# Patient Record
Sex: MALE | Race: BLACK OR AFRICAN AMERICAN | NOT HISPANIC OR LATINO
[De-identification: names, ages, dates, MRNs, and addresses within clinical notes are randomized per-mention and may not be internally consistent; named-entity substitution may affect disease eponyms.]

---

## 2017-02-06 ENCOUNTER — APPOINTMENT (OUTPATIENT)
Dept: THORACIC SURGERY | Facility: CLINIC | Age: 72
End: 2017-02-06

## 2017-02-06 VITALS
RESPIRATION RATE: 16 BRPM | HEART RATE: 62 BPM | SYSTOLIC BLOOD PRESSURE: 110 MMHG | WEIGHT: 179 LBS | OXYGEN SATURATION: 93 % | HEIGHT: 70 IN | BODY MASS INDEX: 25.62 KG/M2 | DIASTOLIC BLOOD PRESSURE: 70 MMHG

## 2017-08-04 PROBLEM — Z87.898 HISTORY OF MULTIPLE PULMONARY NODULES: Status: RESOLVED | Noted: 2017-08-04 | Resolved: 2017-08-04

## 2017-08-07 ENCOUNTER — APPOINTMENT (OUTPATIENT)
Dept: THORACIC SURGERY | Facility: CLINIC | Age: 72
End: 2017-08-07
Payer: MEDICARE

## 2017-08-07 VITALS
DIASTOLIC BLOOD PRESSURE: 60 MMHG | HEIGHT: 70 IN | RESPIRATION RATE: 16 BRPM | WEIGHT: 178 LBS | SYSTOLIC BLOOD PRESSURE: 120 MMHG | HEART RATE: 62 BPM | OXYGEN SATURATION: 3 % | BODY MASS INDEX: 25.48 KG/M2

## 2017-08-07 DIAGNOSIS — Z87.898 PERSONAL HISTORY OF OTHER SPECIFIED CONDITIONS: ICD-10-CM

## 2017-08-07 PROCEDURE — 99214 OFFICE O/P EST MOD 30 MIN: CPT

## 2018-03-11 ENCOUNTER — INPATIENT (INPATIENT)
Facility: HOSPITAL | Age: 73
LOS: 0 days | Discharge: HOME HEALTH | End: 2018-03-12
Attending: INTERNAL MEDICINE | Admitting: INTERNAL MEDICINE
Payer: MEDICARE

## 2018-03-11 VITALS
OXYGEN SATURATION: 95 % | DIASTOLIC BLOOD PRESSURE: 64 MMHG | TEMPERATURE: 100 F | RESPIRATION RATE: 18 BRPM | SYSTOLIC BLOOD PRESSURE: 118 MMHG | HEART RATE: 99 BPM

## 2018-03-11 DIAGNOSIS — M79.89 OTHER SPECIFIED SOFT TISSUE DISORDERS: ICD-10-CM

## 2018-03-11 LAB
ALBUMIN SERPL ELPH-MCNC: 3.6 G/DL — SIGNIFICANT CHANGE UP (ref 3.3–5)
ALP SERPL-CCNC: 66 U/L — SIGNIFICANT CHANGE UP (ref 40–120)
ALT FLD-CCNC: 42 U/L — HIGH (ref 4–41)
APTT BLD: 35.9 SEC — SIGNIFICANT CHANGE UP (ref 27.5–37.4)
AST SERPL-CCNC: 74 U/L — HIGH (ref 4–40)
BASOPHILS # BLD AUTO: 0.04 K/UL — SIGNIFICANT CHANGE UP (ref 0–0.2)
BASOPHILS NFR BLD AUTO: 0.5 % — SIGNIFICANT CHANGE UP (ref 0–2)
BILIRUB SERPL-MCNC: 1.1 MG/DL — SIGNIFICANT CHANGE UP (ref 0.2–1.2)
BUN SERPL-MCNC: 23 MG/DL — SIGNIFICANT CHANGE UP (ref 7–23)
CALCIUM SERPL-MCNC: 9.6 MG/DL — SIGNIFICANT CHANGE UP (ref 8.4–10.5)
CHLORIDE SERPL-SCNC: 99 MMOL/L — SIGNIFICANT CHANGE UP (ref 98–107)
CO2 SERPL-SCNC: 27 MMOL/L — SIGNIFICANT CHANGE UP (ref 22–31)
CREAT SERPL-MCNC: 1.36 MG/DL — HIGH (ref 0.5–1.3)
CRP SERPL-MCNC: 36.8 MG/L — HIGH
EOSINOPHIL # BLD AUTO: 0.01 K/UL — SIGNIFICANT CHANGE UP (ref 0–0.5)
EOSINOPHIL NFR BLD AUTO: 0.1 % — SIGNIFICANT CHANGE UP (ref 0–6)
ERYTHROCYTE [SEDIMENTATION RATE] IN BLOOD: 1 MM/HR — SIGNIFICANT CHANGE UP (ref 1–15)
GLUCOSE SERPL-MCNC: 75 MG/DL — SIGNIFICANT CHANGE UP (ref 70–99)
HCT VFR BLD CALC: 42.7 % — SIGNIFICANT CHANGE UP (ref 39–50)
HGB BLD-MCNC: 12.3 G/DL — LOW (ref 13–17)
IMM GRANULOCYTES # BLD AUTO: 0.05 # — SIGNIFICANT CHANGE UP
IMM GRANULOCYTES NFR BLD AUTO: 0.6 % — SIGNIFICANT CHANGE UP (ref 0–1.5)
INR BLD: 1.94 — HIGH (ref 0.88–1.17)
LYMPHOCYTES # BLD AUTO: 1.17 K/UL — SIGNIFICANT CHANGE UP (ref 1–3.3)
LYMPHOCYTES # BLD AUTO: 15.2 % — SIGNIFICANT CHANGE UP (ref 13–44)
MCHC RBC-ENTMCNC: 23 PG — LOW (ref 27–34)
MCHC RBC-ENTMCNC: 28.8 % — LOW (ref 32–36)
MCV RBC AUTO: 80 FL — SIGNIFICANT CHANGE UP (ref 80–100)
MONOCYTES # BLD AUTO: 1.04 K/UL — HIGH (ref 0–0.9)
MONOCYTES NFR BLD AUTO: 13.5 % — SIGNIFICANT CHANGE UP (ref 2–14)
NEUTROPHILS # BLD AUTO: 5.39 K/UL — SIGNIFICANT CHANGE UP (ref 1.8–7.4)
NEUTROPHILS NFR BLD AUTO: 70.1 % — SIGNIFICANT CHANGE UP (ref 43–77)
NRBC # FLD: 0.09 — SIGNIFICANT CHANGE UP
NRBC FLD-RTO: 1.2 — SIGNIFICANT CHANGE UP
NT-PROBNP SERPL-SCNC: 1181 PG/ML — SIGNIFICANT CHANGE UP
PLATELET # BLD AUTO: 251 K/UL — SIGNIFICANT CHANGE UP (ref 150–400)
PMV BLD: 12.1 FL — SIGNIFICANT CHANGE UP (ref 7–13)
POTASSIUM SERPL-MCNC: SIGNIFICANT CHANGE UP MMOL/L (ref 3.5–5.3)
POTASSIUM SERPL-SCNC: SIGNIFICANT CHANGE UP MMOL/L (ref 3.5–5.3)
PROT SERPL-MCNC: 6.7 G/DL — SIGNIFICANT CHANGE UP (ref 6–8.3)
PROTHROM AB SERPL-ACNC: 22.6 SEC — HIGH (ref 9.8–13.1)
RBC # BLD: 5.34 M/UL — SIGNIFICANT CHANGE UP (ref 4.2–5.8)
RBC # FLD: 22 % — HIGH (ref 10.3–14.5)
SODIUM SERPL-SCNC: 142 MMOL/L — SIGNIFICANT CHANGE UP (ref 135–145)
URATE SERPL-MCNC: 9.9 MG/DL — HIGH (ref 3.4–8.8)
WBC # BLD: 7.7 K/UL — SIGNIFICANT CHANGE UP (ref 3.8–10.5)
WBC # FLD AUTO: 7.7 K/UL — SIGNIFICANT CHANGE UP (ref 3.8–10.5)

## 2018-03-11 PROCEDURE — 73630 X-RAY EXAM OF FOOT: CPT | Mod: 26,LT

## 2018-03-11 PROCEDURE — 71045 X-RAY EXAM CHEST 1 VIEW: CPT | Mod: 26

## 2018-03-11 PROCEDURE — 93970 EXTREMITY STUDY: CPT | Mod: 26

## 2018-03-11 PROCEDURE — 73610 X-RAY EXAM OF ANKLE: CPT | Mod: 26,LT

## 2018-03-11 RX ORDER — COLCHICINE 0.6 MG
1.2 TABLET ORAL ONCE
Qty: 0 | Refills: 0 | Status: COMPLETED | OUTPATIENT
Start: 2018-03-11 | End: 2018-03-11

## 2018-03-11 RX ORDER — OXYCODONE HYDROCHLORIDE 5 MG/1
5 TABLET ORAL ONCE
Qty: 0 | Refills: 0 | Status: DISCONTINUED | OUTPATIENT
Start: 2018-03-11 | End: 2018-03-11

## 2018-03-11 RX ADMIN — Medication 100 MILLIGRAM(S): at 22:25

## 2018-03-11 RX ADMIN — Medication 1.2 MILLIGRAM(S): at 21:21

## 2018-03-11 NOTE — ED PROVIDER NOTE - MUSCULOSKELETAL, MLM
Spine appears normal, range of motion is not limited, + left forefoot/midfoot mild erythema and moderate warmth with limited AROM and PROM 2/2 pain, unable to appreciate pulses but <2 sec cap refill, compartments soft, TTP,, b/l 2+ pitting edema to thighs

## 2018-03-11 NOTE — ED PROVIDER NOTE - ATTENDING CONTRIBUTION TO CARE
Chairez: 72 yom with HTN, PVD with right leg stent c/o left foot pain for 2 days now unable to walk on it and worsening swelling.  Pt notes he is normally very active.  No hx of gout.  no fever, no trauma, no previous similar.  On exam, pt is well appearing no distress, left foot +mild edema and very tn to left midfoot not over a joint, no erythema (dark skin), +edema bilaterally.  Unsure if cellulitis, or gout or PVD? - labs, xray, podiatry input pending at time of sign out.

## 2018-03-11 NOTE — ED ADULT NURSE NOTE - CHIEF COMPLAINT QUOTE
states awoke sat am,pain l ft,unable to bear wt lft,unable to walk on l ft.  l ft appears swollen,warm to touch with redness noted.  unable to palpate l pedal pulse at triage

## 2018-03-11 NOTE — ED PROVIDER NOTE - PSH
Peripheral vascular disease  s/p right femoral popliteal bypass about 2 years ago  S/P lumbar laminectomy  2010, L4 L5  S/P prostatectomy  for prostate cancer, several years ago, no chemo/radiation therapy  Stented coronary artery  2001

## 2018-03-11 NOTE — CONSULT NOTE ADULT - SUBJECTIVE AND OBJECTIVE BOX
Patient is a 72y old  Male who presents with a chief complaint of left foot pain    HPI: 73 yo M HTN, CAD, PVD, and hx of stents presents to ED for 10/10 pain to left foot pain. Pt relates it started yesterday morning and has worsened over the past 24 hours. Pt states that he spends most of the day on his feet running errands and doing household chores. Pt reports that today it took him over 30 min to walk from his bed to the door to go to the ER. Pt denies trauma, denies history of gout, denies new shoe gear, denies NVFC or SOB.       PAST MEDICAL & SURGICAL HISTORY:  PVD (peripheral vascular disease)  CAD (coronary artery disease): stented coronary artery 2001  Pneumonia: childhood, no reoccurance  Atherosclerosis of arteries: right leg.  Prostate Ca  Dyslipidemia  HTN (Hypertension), Benign  Glaucoma  Stented coronary artery: 2001  Peripheral vascular disease: s/p right femoral popliteal bypass about 2 years ago  S/P lumbar laminectomy: 2010, L4 L5  S/P prostatectomy: for prostate cancer, several years ago, no chemo/radiation therapy      MEDICATIONS  (STANDING):    MEDICATIONS  (PRN):      Allergies    No Known Allergies    Intolerances        VITALS:    Vital Signs Last 24 Hrs  T(C): 37.7 (11 Mar 2018 16:43), Max: 37.7 (11 Mar 2018 16:43)  T(F): 99.8 (11 Mar 2018 16:43), Max: 99.8 (11 Mar 2018 16:43)  HR: 99 (11 Mar 2018 16:43) (99 - 99)  BP: 118/64 (11 Mar 2018 16:43) (118/64 - 118/64)  BP(mean): --  RR: 18 (11 Mar 2018 16:43) (18 - 18)  SpO2: 95% (11 Mar 2018 16:43) (95% - 95%)    LABS:                          12.3   7.70  )-----------( 251      ( 11 Mar 2018 19:10 )             42.7       03-11    142  |  99  |  23  ----------------------------<  73  Test not performed SPECIMEN GROSSLY HEMOLYZED   |  27  |  1.36<H>    Ca    9.6      11 Mar 2018 19:10    TPro  6.7  /  Alb  3.6  /  TBili  1.1  /  DBili  x   /  AST  74<H>  /  ALT  42<H>  /  AlkPhos  66  03-11      CAPILLARY BLOOD GLUCOSE      POCT Blood Glucose.: 69 mg/dL (11 Mar 2018 17:07)      PT/INR - ( 11 Mar 2018 19:10 )   PT: 22.6 SEC;   INR: 1.94          PTT - ( 11 Mar 2018 19:10 )  PTT:35.9 SEC    LOWER EXTREMITY PHYSICAL EXAM:    Vascular: DP/PT 0/4, B/L, CFT <5 seconds B/L difficult to accurately assess due to dark skin, Temperature gradient warm B/L, L warmer than R. B/l +2 pitting edema to leg and foot   Neuro: Epicritic sensation intact to b/l feet. Allodynia noted to left dorsal midfoot extending to L proximal ankle.  Musculoskeletal/Ortho: No gross deformities to b/l foot. localized pain beginning at midshalf of metatarsals 1-5 extending proximally to the proximal Ankle join, an extends to medial and lateral mal, but not to posterior heel  Skin: No open lesions or abrasions, no loss in skin lines, or blanching      RADIOLOGY & ADDITIONAL STUDIES:  < from: Xray Foot AP + Lateral + Oblique, Left (03.11.18 @ 18:20) >  ******PRELIMINARY REPORT******    ******PRELIMINARY REPORT******            EXAM:  RAD FOOT MIN 3 VIEWS LT        PROCEDURE DATE:  Mar 11 2018     ******PRELIMINARY REPORT******    ******PRELIMINARY REPORT******            INTERPRETATION:  No acutefracture. No emergent findings.            ******PRELIMINARY REPORT******    ******PRELIMINARY REPORT******          DAVID LOCKE M.D., RADIOLOGY RESIDENT      < end of copied text > Patient is a 72y old  Male who presents with a chief complaint of left foot pain    HPI: 71 yo M HTN, CAD, PVD, and hx of stents presents to ED for 10/10 pain to left foot pain. Pt relates it started yesterday morning and has worsened over the past 24 hours. Pt states that he spends most of the day on his feet running errands and doing household chores. Pt reports that today it took him over 30 min to walk from his bed to the door to go to the ER. Pt relates pain w/ just the bedsheets on his foot. Pt relates the pain is tolerable while lying, but excruciating with any weight applied. Pt denies trauma, denies history of gout, denies new shoe gear, denies NVFC or SOB.       PAST MEDICAL & SURGICAL HISTORY:  PVD (peripheral vascular disease)  CAD (coronary artery disease): stented coronary artery 2001  Pneumonia: childhood, no reoccurance  Atherosclerosis of arteries: right leg.  Prostate Ca  Dyslipidemia  HTN (Hypertension), Benign  Glaucoma  Stented coronary artery: 2001  Peripheral vascular disease: s/p right femoral popliteal bypass about 2 years ago  S/P lumbar laminectomy: 2010, L4 L5  S/P prostatectomy: for prostate cancer, several years ago, no chemo/radiation therapy      MEDICATIONS  (STANDING):    MEDICATIONS  (PRN):      Allergies    No Known Allergies    Intolerances        VITALS:    Vital Signs Last 24 Hrs  T(C): 37.7 (11 Mar 2018 16:43), Max: 37.7 (11 Mar 2018 16:43)  T(F): 99.8 (11 Mar 2018 16:43), Max: 99.8 (11 Mar 2018 16:43)  HR: 99 (11 Mar 2018 16:43) (99 - 99)  BP: 118/64 (11 Mar 2018 16:43) (118/64 - 118/64)  BP(mean): --  RR: 18 (11 Mar 2018 16:43) (18 - 18)  SpO2: 95% (11 Mar 2018 16:43) (95% - 95%)    LABS:                          12.3   7.70  )-----------( 251      ( 11 Mar 2018 19:10 )             42.7       03-11    142  |  99  |  23  ----------------------------<  73  Test not performed SPECIMEN GROSSLY HEMOLYZED   |  27  |  1.36<H>    Ca    9.6      11 Mar 2018 19:10    TPro  6.7  /  Alb  3.6  /  TBili  1.1  /  DBili  x   /  AST  74<H>  /  ALT  42<H>  /  AlkPhos  66  03-11      CAPILLARY BLOOD GLUCOSE      POCT Blood Glucose.: 69 mg/dL (11 Mar 2018 17:07)      PT/INR - ( 11 Mar 2018 19:10 )   PT: 22.6 SEC;   INR: 1.94          PTT - ( 11 Mar 2018 19:10 )  PTT:35.9 SEC    LOWER EXTREMITY PHYSICAL EXAM:    Vascular: DP/PT 0/4, B/L, CFT <5 seconds B/L difficult to accurately assess due to dark skin, Temperature gradient warm B/L, L warmer than R. B/l +2 pitting edema to leg and foot. No change in pain with elevation or dependency  Neuro: Epicritic sensation intact to b/l feet. Allodynia noted to left dorsal midfoot extending to L proximal ankle.  Musculoskeletal/Ortho: No gross deformities to b/l foot. localized pain beginning at midshalf of metatarsals 1-5 extending proximally to the proximal Ankle join, an extends to medial and lateral mal, but not to posterior heel  Skin: No open lesions or abrasions, no loss in skin lines, or blanching      RADIOLOGY & ADDITIONAL STUDIES:  < from: Xray Foot AP + Lateral + Oblique, Left (03.11.18 @ 18:20) >  ******PRELIMINARY REPORT******    ******PRELIMINARY REPORT******            EXAM:  RAD FOOT MIN 3 VIEWS LT        PROCEDURE DATE:  Mar 11 2018     ******PRELIMINARY REPORT******    ******PRELIMINARY REPORT******            INTERPRETATION:  No acutefracture. No emergent findings.            ******PRELIMINARY REPORT******    ******PRELIMINARY REPORT******          DAVID LOCKE M.D., RADIOLOGY RESIDENT      < end of copied text >

## 2018-03-11 NOTE — ED ADULT TRIAGE NOTE - CHIEF COMPLAINT QUOTE
states awoke sat am,pain l ft,unable to bear wt lft,unable to walk on l ft.  l ft appears swollen,warm to touch with redness noted states awoke sat am,pain l ft,unable to bear wt lft,unable to walk on l ft.  l ft appears swollen,warm to touch with redness noted.  unable to palpate l pedal pulse at triage

## 2018-03-11 NOTE — ED PROVIDER NOTE - PROGRESS NOTE DETAILS
Brad PGY1: Refused oxycodone and tylenol, will reassess if he needs pain medications, he knows that we can provide him something should he require it

## 2018-03-11 NOTE — ED PROVIDER NOTE - OBJECTIVE STATEMENT
72M hx HTN, PVD s/p right vascular stent on edoxaban 60mg daily, c/o left foot nontraumatic swelling occuring 2 days ago upon awakening from bed, has noted 10/10 pain but refuses pain medication 72M hx HTN, PVD s/p right vascular stent on edoxaban 60mg daily, c/o left foot nontraumatic swelling occuring 2 days ago upon awakening from bed, has noted 10/10 pain but refuses pain medication    PCP:: Anna ([facility])  Vascular Surgeon : Carolina 72M hx HTN, PVD s/p right vascular stent, CAD s/p stent per chart records c/o left foot atraumatic swelling occurring 2 days ago upon awakening from bed, has noted 10/10 pain with inability to ambulate and worsening swelling. Notes he is normally independent with ADLs, denies all cardiac hx. Denies injuries, bug bites, previous hx of gout, or fevers/chills. Has b/l lower extremity swelling but denies knowing if it is worse or better. Notes intermittent SOB that is chronic, no cp/ palpitations/ new orthopnea.     PCP:: Irma ([facility])  Vascular Surgeon : Carolina    Meds:   Metoprolol ER 25mg  Pravastatin 40mg  Amlodipine 5mg  Benazepril 40mg  ASA 81mg  Savaysa 60mg

## 2018-03-11 NOTE — ED PROVIDER NOTE - PMH
Atherosclerosis of arteries  right leg.  CAD (coronary artery disease)  stented coronary artery 2001  Dyslipidemia    Glaucoma    HTN (Hypertension), Benign    Pneumonia  childhood, no reoccurance  Prostate Ca    PVD (peripheral vascular disease)

## 2018-03-11 NOTE — CONSULT NOTE ADULT - ASSESSMENT
71 yo M with allodynia to L dorsal foot  - Pt seen and evaluated  - Xrays reviewed, no emergent or acute issues noted  - Rec 1 dose of colchicine for possible acute gout flare 71 yo M with allodynia to L dorsal foot  - Pt seen and evaluated  - Xrays reviewed, no emergent or acute issues noted  - Rec 1 dose of colchicine for possible acute gout flare  - MERCED/PVR to b/l extremities ordered  - MRI left foot ordered  - Uric acid ordered 71 yo M with allodynia to L dorsal foot, PAD vs. Gout flare vs. possible underlying bony abnormality   - Pt seen and evaluated  - Xrays reviewed, no emergent or acute issues noted  - Rec 1 dose of colchicine for possible acute gout flare  - MERCED/PVR to b/l extremities ordered r/o ischemic pain  - MRI left foot ordered  - Uric acid ordered  - Podiatry will follow  - Discussed w/ attending

## 2018-03-11 NOTE — ED PROVIDER NOTE - MEDICAL DECISION MAKING DETAILS
72M multiple comorbidities with atraumatic left foot swelling without known gout hx or trauma. Will get labs, eval for chf exacerbation in setting of cellulitis vs septic joint (less likely given pain on PROM painful but tolerable, not severely hot joint) vs gouty vs pseudogouty flare, rule out dvt, check coags, admit for inability to ambulate 72M multiple comorbidities with atraumatic left foot swelling without known gout hx or trauma. Will get labs, eval for chf exacerbation in setting of cellulitis vs septic joint (less likely given pain on PROM painful but tolerable, not severely hot joint) vs gouty vs pseudogouty flare, rule out dvt, check coags, admit for inability to ambulate, xrays rule out fx or severe infcn

## 2018-03-11 NOTE — ED ADULT NURSE NOTE - CHIEF COMPLAINT
The patient is a 72y Male complaining of severe L foot pain since last night, unable to walk.  Pain is only on weight bearing at the moment.   Ambulates independently as baseline.  Seen by podietary.  Pt received colcichine, clindamycin.  Admitted to medicine for further management of L foot pain.

## 2018-03-12 ENCOUNTER — TRANSCRIPTION ENCOUNTER (OUTPATIENT)
Age: 73
End: 2018-03-12

## 2018-03-12 VITALS
OXYGEN SATURATION: 100 % | TEMPERATURE: 98 F | RESPIRATION RATE: 18 BRPM | DIASTOLIC BLOOD PRESSURE: 81 MMHG | HEART RATE: 96 BPM | SYSTOLIC BLOOD PRESSURE: 116 MMHG

## 2018-03-12 DIAGNOSIS — Z29.9 ENCOUNTER FOR PROPHYLACTIC MEASURES, UNSPECIFIED: ICD-10-CM

## 2018-03-12 DIAGNOSIS — E78.5 HYPERLIPIDEMIA, UNSPECIFIED: ICD-10-CM

## 2018-03-12 DIAGNOSIS — R74.0 NONSPECIFIC ELEVATION OF LEVELS OF TRANSAMINASE AND LACTIC ACID DEHYDROGENASE [LDH]: ICD-10-CM

## 2018-03-12 DIAGNOSIS — I73.9 PERIPHERAL VASCULAR DISEASE, UNSPECIFIED: ICD-10-CM

## 2018-03-12 DIAGNOSIS — M10.9 GOUT, UNSPECIFIED: ICD-10-CM

## 2018-03-12 DIAGNOSIS — I25.10 ATHEROSCLEROTIC HEART DISEASE OF NATIVE CORONARY ARTERY WITHOUT ANGINA PECTORIS: ICD-10-CM

## 2018-03-12 PROCEDURE — 99236 HOSP IP/OBS SAME DATE HI 85: CPT

## 2018-03-12 PROCEDURE — 93923 UPR/LXTR ART STDY 3+ LVLS: CPT | Mod: 26

## 2018-03-12 RX ORDER — ATORVASTATIN CALCIUM 80 MG/1
10 TABLET, FILM COATED ORAL AT BEDTIME
Qty: 0 | Refills: 0 | Status: DISCONTINUED | OUTPATIENT
Start: 2018-03-12 | End: 2018-03-12

## 2018-03-12 RX ORDER — LATANOPROST 0.05 MG/ML
1 SOLUTION/ DROPS OPHTHALMIC; TOPICAL AT BEDTIME
Qty: 0 | Refills: 0 | Status: DISCONTINUED | OUTPATIENT
Start: 2018-03-12 | End: 2018-03-12

## 2018-03-12 RX ORDER — CLOPIDOGREL BISULFATE 75 MG/1
75 TABLET, FILM COATED ORAL DAILY
Qty: 0 | Refills: 0 | Status: DISCONTINUED | OUTPATIENT
Start: 2018-03-12 | End: 2018-03-12

## 2018-03-12 RX ORDER — COLCHICINE 0.6 MG
0.6 TABLET ORAL ONCE
Qty: 0 | Refills: 0 | Status: COMPLETED | OUTPATIENT
Start: 2018-03-12 | End: 2018-03-12

## 2018-03-12 RX ORDER — METOPROLOL TARTRATE 50 MG
25 TABLET ORAL DAILY
Qty: 0 | Refills: 0 | Status: DISCONTINUED | OUTPATIENT
Start: 2018-03-12 | End: 2018-03-12

## 2018-03-12 RX ORDER — LISINOPRIL 2.5 MG/1
40 TABLET ORAL DAILY
Qty: 0 | Refills: 0 | Status: DISCONTINUED | OUTPATIENT
Start: 2018-03-12 | End: 2018-03-12

## 2018-03-12 RX ORDER — AMLODIPINE BESYLATE 2.5 MG/1
5 TABLET ORAL DAILY
Qty: 0 | Refills: 0 | Status: DISCONTINUED | OUTPATIENT
Start: 2018-03-12 | End: 2018-03-12

## 2018-03-12 RX ORDER — ASPIRIN/CALCIUM CARB/MAGNESIUM 324 MG
81 TABLET ORAL DAILY
Qty: 0 | Refills: 0 | Status: DISCONTINUED | OUTPATIENT
Start: 2018-03-12 | End: 2018-03-12

## 2018-03-12 RX ORDER — ACETAMINOPHEN 500 MG
650 TABLET ORAL EVERY 6 HOURS
Qty: 0 | Refills: 0 | Status: DISCONTINUED | OUTPATIENT
Start: 2018-03-12 | End: 2018-03-12

## 2018-03-12 RX ORDER — PANTOPRAZOLE SODIUM 20 MG/1
40 TABLET, DELAYED RELEASE ORAL
Qty: 0 | Refills: 0 | Status: DISCONTINUED | OUTPATIENT
Start: 2018-03-12 | End: 2018-03-12

## 2018-03-12 RX ORDER — PHYTONADIONE (VIT K1) 5 MG
5 TABLET ORAL ONCE
Qty: 0 | Refills: 0 | Status: DISCONTINUED | OUTPATIENT
Start: 2018-03-12 | End: 2018-03-12

## 2018-03-12 RX ORDER — PANTOPRAZOLE SODIUM 20 MG/1
1 TABLET, DELAYED RELEASE ORAL
Qty: 30 | Refills: 0 | OUTPATIENT
Start: 2018-03-12 | End: 2018-04-10

## 2018-03-12 RX ORDER — CLOPIDOGREL BISULFATE 75 MG/1
1 TABLET, FILM COATED ORAL
Qty: 14 | Refills: 0 | OUTPATIENT
Start: 2018-03-12 | End: 2018-03-25

## 2018-03-12 RX ORDER — EDOXABAN TOSYLATE 30 MG/1
60 TABLET, FILM COATED ORAL DAILY
Qty: 0 | Refills: 0 | Status: DISCONTINUED | OUTPATIENT
Start: 2018-03-12 | End: 2018-03-12

## 2018-03-12 RX ORDER — EDOXABAN TOSYLATE 30 MG/1
1 TABLET, FILM COATED ORAL
Qty: 0 | Refills: 0 | COMMUNITY

## 2018-03-12 RX ADMIN — Medication 0.6 MILLIGRAM(S): at 17:16

## 2018-03-12 RX ADMIN — PANTOPRAZOLE SODIUM 40 MILLIGRAM(S): 20 TABLET, DELAYED RELEASE ORAL at 14:26

## 2018-03-12 RX ADMIN — CLOPIDOGREL BISULFATE 75 MILLIGRAM(S): 75 TABLET, FILM COATED ORAL at 14:26

## 2018-03-12 RX ADMIN — Medication 81 MILLIGRAM(S): at 14:25

## 2018-03-12 RX ADMIN — Medication 25 MILLIGRAM(S): at 14:26

## 2018-03-12 RX ADMIN — LISINOPRIL 40 MILLIGRAM(S): 2.5 TABLET ORAL at 14:26

## 2018-03-12 NOTE — DISCHARGE NOTE ADULT - SECONDARY DIAGNOSIS.
Acute gout of left foot, unspecified cause PVD (peripheral vascular disease) Dyslipidemia HTN (hypertension), benign Chronic diastolic heart failure Stented coronary artery

## 2018-03-12 NOTE — DISCHARGE NOTE ADULT - PATIENT PORTAL LINK FT
You can access the [facility], offered by [facility], by registering with the following website: [email or web address]

## 2018-03-12 NOTE — H&P ADULT - PROBLEM SELECTOR PLAN 3
S/p RLE stent  - LLE pain does not appear to be related to PVD clinically but will rule out with duplex nevertheless  - C/w ASA and statin S/p RLE stent and fem-pop bypass  - LLE pain does not appear to be related to PVD clinically but will rule out with duplex nevertheless  - C/w ASA and statin  - Patient on Edoxaban per wife, but unable to corroborate indication with PCP (Dr. Solis). Vascular surgeon out of town. Cardiologist states patient with no hx of A-fib and was on coumadin since 2015 s/p bypass.   - Substitute plavix 75mg in place of DOAC given no clear indication for it at present time. F/u with vascular sx on discharge regarding resuming agent

## 2018-03-12 NOTE — DISCHARGE NOTE ADULT - PLAN OF CARE
improved You were given colchicine during admission and your leg pain and swelling improved You were evaluated by podiatry during your stay.  imaging of your legs was completed without acute intervention needed. You were given colchicine during admission and your leg pain and swelling improved.  Follow up with your PCP within 1 week of discharge for further necessary care.  Follow up with podiatry within 1 week of discharge for further care - Dr. Almazan Follow up with your PCP outpatient within 1 week of discharge Continue cholesterol control medications. Continue DASH diet. Follow up with your PCP within 1 week of discharge for further management and monitoring of lipid and cholesterol panels. You were evaluated by podiatry during your stay.  imaging of your legs was completed without acute intervention needed.  You will need MRI of your leg as reommended by podiatry - Follow up with Dr. Almazan for further work up and imaging. Follow up with your PCP outpatient within 1 week of discharge.  Start taking plavix for stents with aspirin --   During hospital stay Continue blood pressure medication regimen as directed. Monitor for any visual changes, headaches or dizziness.  Monitor blood pressure regularly.  Follow up with your PCP for further management for high blood pressure. Follow up with your cardiologist outpatient Continue aspirin daily Follow up with your PCP outpatient within 1 week of discharge.  Start taking plavix for stents with aspirin --   During hospital stay your blood blow of your legs was evaluated with a MERCED/PVR study - follow up outpatient with podiatry and your vascular surgeon for results.

## 2018-03-12 NOTE — DISCHARGE NOTE ADULT - PROVIDER TOKENS
TOKEN:'[id number]:'[id number] TOKEN:'[id number]',FREE:[LAST:[Cardiologist],PHONE:[(   )    -],FAX:[(   )    -]] TOKEN:'[id number]',FREE:[LAST:[Cardiologist],PHONE:[(   )    -],FAX:[(   )    -]],FREE:[LAST:[Vascular Surgeon],PHONE:[(   )    -],FAX:[(   )    -]]

## 2018-03-12 NOTE — H&P ADULT - PROBLEM SELECTOR PLAN 8
Mild, likely alcohol related given AST > ALT and hx of drinking  - Monitor LFT's DVT ppx- C/w Edoxaban or other DOAC; improve score 1  GI ppx- Add PPI given started on DAPT

## 2018-03-12 NOTE — PROGRESS NOTE ADULT - ASSESSMENT
71 yo M with allodynia to L dorsal foot, PAD vs. Gout flare vs. possible underlying bony abnormality, likely gout flare c/w UA/response to Colchicine  - Pt seen and evaluated  - Xrays reviewed, no emergent or acute issues noted  - Rec Colchicine outpatient on d/c  - stable for d/c per podiatry  - Discussed w/ attending

## 2018-03-12 NOTE — H&P ADULT - PROBLEM SELECTOR PLAN 7
Patient clinically with ROMEO after walking 2-3 blocks with mild b/l LE edema and borderline proBNP  - Check TTE as outpatient; no need to perform in house  - C/w metoprolol/ACE INH Mild, likely alcohol related given AST > ALT and hx of drinking  - Monitor LFT's

## 2018-03-12 NOTE — DISCHARGE NOTE ADULT - HOSPITAL COURSE
73 yo M HTN, HLD, CAD s/p stent many years ago, PAD s/p RLE stent s/p fem-pop bypass, and CKD stage 3? (baseline unclear) who presents to ED for 10/10 pain to left foot pain for 1 day duration, likely consistent with acute gout flare. In the ED, patient HDS and afebrile. Labs indicated elevated inflammatory marker (CRP high), with elevated BUN/Cr, elevated uric acid, and mild transaminitis. X-ray of LLE is without fracture or dislocation. Duplex LLE negative for DVT. Podiatry evaluated patient and recommended MRI left foot and PVR/MERCED to rule out worsening PAD of LLE. MRI of foot could be done as outpatient and LLE arterial duplex done but not read as of yet. Given lower concern for PAD clinically, read can be followed up as outpatient. Patient received colchicine 1.2mg x1 and 0.6mg x1 with very good response and improvement in symptoms. He was evaluated by PT, who recommended walker, which patient already has. Home PT to be setup as well. Patient is medically stable for discharge home. Home Edoxaban was changed to Plavix 75mg upon conversation with both patient's cardiologist and PCP (Dr. Solis) who confirm patient has no hx of DVT/PE or A-fib (DOAC prescribed s/p fem-pop surgery in past). Patient's vascular surgeon could not be reached during admission. PPI added as well for GI ppx. Patient to follow up with PCP, vascular sx, podiatry, and cardiology.     DISCHARGE- Patient was admitted this morning, observed for > 8 hours, and discharged in medically stable condition.

## 2018-03-12 NOTE — DISCHARGE NOTE ADULT - CARE PROVIDERS DIRECT ADDRESSES
,DirectAddress_Unknown,[email or web address] ,DirectAddress_Unknown,[email or web address],DirectAddress_Unknown ,DirectAddress_Unknown,[email or web address],DirectAddress_Unknown,DirectAddress_Unknown

## 2018-03-12 NOTE — H&P ADULT - NSHPPHYSICALEXAM_GEN_ALL_CORE
GENERAL: NAD, well-developed  HEAD:  Atraumatic, Normocephalic  EYES: EOMI, PERRLA, conjunctiva and sclera clear  NECK: Supple, No JVD  CHEST/LUNG: Clear to auscultation bilaterally; No wheeze  HEART: Regular rate and rhythm; No murmurs, rubs, or gallops  ABDOMEN: Soft, Nontender, Nondistended; Bowel sounds present  EXTREMITIES:  LLE mild TTP over dorsum of foot and hallux with +1 pitting edema to mid calf, RLE pitting edema +1 to ankle, dorsal pedis pulses difficult to palpate but warm and well perfused  PSYCH: AAOx3  NEUROLOGY: Inability to bear much weight on LLE  SKIN: No rashes or lesions

## 2018-03-12 NOTE — H&P ADULT - PROBLEM SELECTOR PLAN 6
- Statin Patient clinically with ROMEO after walking 2-3 blocks with mild b/l LE edema and borderline proBNP  - Check TTE as outpatient; no need to perform in house  - C/w metoprolol/ACE INH

## 2018-03-12 NOTE — H&P ADULT - ASSESSMENT
73 yo M HTN, CAD s/p stent many years ago, PAD s/p RLE stent, A-fib??, CKD stage 3? who presents to ED for 10/10 pain to left foot pain for 1 day duration, likely consistent with acute gout flare. 71 yo M HTN, CAD s/p stent many years ago, PAD s/p RLE stent s/p fem-pop bypass, CKD stage 3? who presents to ED for 10/10 pain to left foot pain for 1 day duration, likely consistent with acute gout flare.

## 2018-03-12 NOTE — H&P ADULT - PROBLEM SELECTOR PLAN 1
LLE pain appears consistent with acute gout flare, although no hx of gout in past. Patient with significant improvement with single dose of colchicine with elevated CRP and uric acid level  - X-ray of LLE, reviewed, without any fracture but swelling noted  - Podiatry consult appreciated  - Check B/L arterial duplex to rule out worsening PAD given strong hx of ASCVD  - Will give Colchicine x1 0.6mg today and monitor for improvement  - MRI foot ordered, which can be done as outpatient  - PT consult placed

## 2018-03-12 NOTE — H&P ADULT - PROBLEM SELECTOR PLAN 4
Unclear hx but patient on Edoxaban; Check EKG  - Med rec pharmacist checking if Edoxaban available; if not, will substitute   - C/w Metoprolol for rate control Presumed CKD 3; doubt MARY GRACE   - Encourage oral intake  - Monitor SCr  - Can obtain outpatient records if needed  - C/w ACE INH for now

## 2018-03-12 NOTE — DISCHARGE NOTE ADULT - MEDICATION SUMMARY - MEDICATIONS TO TAKE
I will START or STAY ON the medications listed below when I get home from the hospital:    aspirin 81 mg oral tablet, chewable  -- 1 tab(s) by mouth once a day in morning  -- Indication: For Coronary artery disease involving native coronary artery of native heart without angina pectoris    pravastatin 40 mg oral tablet  -- tab(s) by mouth once a day in morning  -- Indication: For Dyslipidemia    Lotrel 5 mg-40 mg oral capsule  -- 1 cap(s) by mouth once a day in morning  -- Indication: For hypertension    clopidogrel 75 mg oral tablet  -- 1 tab(s) by mouth once a day  -- Indication: For PVD (peripheral vascular disease)    Metoprolol Succinate ER 25 mg oral tablet, extended release  -- 1 tab(s) by mouth once a day in morning  -- Indication: For hypertension    latanoprost ophthalmic 0.005% ophthalmic solution  -- 1 drop(s) to each affected eye once a day (at bedtime) - both eyes  -- Indication: For eye drops    pantoprazole 40 mg oral delayed release tablet  -- 1 tab(s) by mouth once a day (before a meal)  -- Indication: For GERD

## 2018-03-12 NOTE — H&P ADULT - PROBLEM SELECTOR PLAN 5
Presumed CKD 3; doubt MARY GRACE   - Encourage oral intake  - Monitor SCr  - Can obtain outpatient records if needed  - C/w ACE INH for now - Statin

## 2018-03-12 NOTE — DISCHARGE NOTE ADULT - CARE PLAN
Principal Discharge DX:	Foot swelling  Goal:	improved  Secondary Diagnosis:	Acute gout of left foot, unspecified cause  Assessment and plan of treatment:	You were given colchicine during admission and your leg pain and swelling improved  Secondary Diagnosis:	PVD (peripheral vascular disease)  Secondary Diagnosis:	Dyslipidemia Principal Discharge DX:	Foot swelling  Goal:	improved  Assessment and plan of treatment:	You were evaluated by podiatry during your stay.  imaging of your legs was completed without acute intervention needed.  Secondary Diagnosis:	Acute gout of left foot, unspecified cause  Assessment and plan of treatment:	You were given colchicine during admission and your leg pain and swelling improved.  Follow up with your PCP within 1 week of discharge for further necessary care.  Follow up with podiatry within 1 week of discharge for further care - Dr. Almazan  Secondary Diagnosis:	PVD (peripheral vascular disease)  Assessment and plan of treatment:	Follow up with your PCP outpatient within 1 week of discharge  Secondary Diagnosis:	Dyslipidemia  Assessment and plan of treatment:	Continue cholesterol control medications. Continue DASH diet. Follow up with your PCP within 1 week of discharge for further management and monitoring of lipid and cholesterol panels. Principal Discharge DX:	Foot swelling  Goal:	improved  Assessment and plan of treatment:	You were evaluated by podiatry during your stay.  imaging of your legs was completed without acute intervention needed.  You will need MRI of your leg as reommended by podiatry - Follow up with Dr. Almazan for further work up and imaging.  Secondary Diagnosis:	Acute gout of left foot, unspecified cause  Assessment and plan of treatment:	You were given colchicine during admission and your leg pain and swelling improved.  Follow up with your PCP within 1 week of discharge for further necessary care.  Follow up with podiatry within 1 week of discharge for further care - Dr. Almazan  Secondary Diagnosis:	PVD (peripheral vascular disease)  Assessment and plan of treatment:	Follow up with your PCP outpatient within 1 week of discharge.  Start taking plavix for stents with aspirin --   During hospital stay  Secondary Diagnosis:	Dyslipidemia  Assessment and plan of treatment:	Continue cholesterol control medications. Continue DASH diet. Follow up with your PCP within 1 week of discharge for further management and monitoring of lipid and cholesterol panels.  Secondary Diagnosis:	HTN (hypertension), benign  Assessment and plan of treatment:	Continue blood pressure medication regimen as directed. Monitor for any visual changes, headaches or dizziness.  Monitor blood pressure regularly.  Follow up with your PCP for further management for high blood pressure.  Secondary Diagnosis:	Chronic diastolic heart failure  Assessment and plan of treatment:	Follow up with your cardiologist outpatient  Secondary Diagnosis:	Stented coronary artery  Assessment and plan of treatment:	Continue aspirin daily Principal Discharge DX:	Foot swelling  Goal:	improved  Assessment and plan of treatment:	You were evaluated by podiatry during your stay.  imaging of your legs was completed without acute intervention needed.  You will need MRI of your leg as reommended by podiatry - Follow up with Dr. Almazan for further work up and imaging.  Secondary Diagnosis:	Acute gout of left foot, unspecified cause  Assessment and plan of treatment:	You were given colchicine during admission and your leg pain and swelling improved.  Follow up with your PCP within 1 week of discharge for further necessary care.  Follow up with podiatry within 1 week of discharge for further care - Dr. Almazan  Secondary Diagnosis:	PVD (peripheral vascular disease)  Assessment and plan of treatment:	Follow up with your PCP outpatient within 1 week of discharge.  Start taking plavix for stents with aspirin --   During hospital stay your blood blow of your legs was evaluated with a MERCED/PVR study - follow up outpatient with podiatry and your vascular surgeon for results.  Secondary Diagnosis:	Dyslipidemia  Assessment and plan of treatment:	Continue cholesterol control medications. Continue DASH diet. Follow up with your PCP within 1 week of discharge for further management and monitoring of lipid and cholesterol panels.  Secondary Diagnosis:	HTN (hypertension), benign  Assessment and plan of treatment:	Continue blood pressure medication regimen as directed. Monitor for any visual changes, headaches or dizziness.  Monitor blood pressure regularly.  Follow up with your PCP for further management for high blood pressure.  Secondary Diagnosis:	Chronic diastolic heart failure  Assessment and plan of treatment:	Follow up with your cardiologist outpatient  Secondary Diagnosis:	Stented coronary artery  Assessment and plan of treatment:	Continue aspirin daily

## 2018-03-12 NOTE — DISCHARGE NOTE ADULT - MEDICATION SUMMARY - MEDICATIONS TO STOP TAKING
I will STOP taking the medications listed below when I get home from the hospital:    Savaysa 60 mg oral tablet  -- 1 tab(s) by mouth once a day

## 2018-03-12 NOTE — H&P ADULT - NSHPLABSRESULTS_GEN_ALL_CORE
12.3   7.70  )-----------( 251      ( 11 Mar 2018 19:10 )             42.7   03-11    142  |  99  |  23  ----------------------------<  75  Test not performed SPECIMEN GROSSLY HEMOLYZED   |  27  |  1.36<H>    Ca    9.6      11 Mar 2018 19:10    TPro  6.7  /  Alb  3.6  /  TBili  1.1  /  DBili  x   /  AST  74<H>  /  ALT  42<H>  /  AlkPhos  66  03-11    IMAGING: Personally reviewed  LLE X-ray- No fracture or dislocation

## 2018-03-12 NOTE — DISCHARGE NOTE ADULT - CARE PROVIDER_API CALL
Lucian Almazan (DPAUGUST), Surgery  [address] [place] [address]  Phone: [phone]  Fax: [phone]    Lluvia Solis), Internal Medicine  [address] [place] [address]  Phone: [phone]  Fax: [phone] Lucian Almazan (DPAUGUST), Surgery  [address] [place] [address]  Phone: [phone]  Fax: [phone]    Lluvia Solis), Internal Medicine  [address] [place] [address]  Phone: [phone]  Fax: [phone]    Cardiologist,   Phone: (   )    -  Fax: (   )    - Lucian Almazan (DPAUGUST), Surgery  [address] [place] [address]  Phone: [phone]  Fax: [phone]    Lluvia Solis), Internal Medicine  [address] [place] [address]  Phone: [phone]  Fax: [phone]    Cardiologist,   Phone: (   )    -  Fax: (   )    -    Vascular Surgeon,   Phone: (   )    -  Fax: (   )    -

## 2018-03-12 NOTE — H&P ADULT - HISTORY OF PRESENT ILLNESS
This is a 71 yo M HTN, CAD s/p stent many years ago, PAD s/p RLE stent, A-fib??, CKD stage 3? who presents to ED for 10/10 pain to left foot pain for 1 day duration. Pt relates pain started on Sunday morning when he got out of bed and characterizes it is as 10/10, sharp, non-radiating, associated with inability to bear weight and LLE swelling, without any relieving factors. Pt states that he spends most of the day on his feet running errands and doing household chores. Pt reports that today it took him over 30 min to walk from his bed to the door to go to the ER. Pt relates pain w/ just the bedsheets on his foot. He has never had this pain before. Pt relates the pain is tolerable while lying, but excruciating with any weight applied. Pt denies trauma, denies history of gout, denies new shoe gear, denies NVFC or SOB. In the ED, patient hemodynamically and afebrile. He received 1 dose of Colchicine with good response. Duplex negative for DVT of B/L LE's. Podiatry consult called. This is a 71 yo M HTN, CAD s/p stent many years ago, PAD s/p RLE stent s/p fem-pop bypass, CKD stage 3? who presents to ED for 10/10 pain to left foot pain for 1 day duration. Pt relates pain started on Sunday morning when he got out of bed and characterizes it is as 10/10, sharp, non-radiating, associated with inability to bear weight and LLE swelling, without any relieving factors. Pt states that he spends most of the day on his feet running errands and doing household chores. Pt reports that today it took him over 30 min to walk from his bed to the door to go to the ER. Pt relates pain w/ just the bedsheets on his foot. He has never had this pain before. Pt relates the pain is tolerable while lying, but excruciating with any weight applied. Pt denies trauma, denies history of gout, denies new shoe gear, denies NVFC or SOB. In the ED, patient hemodynamically and afebrile. He received 1 dose of Colchicine with good response. Duplex negative for DVT of B/L LE's. Podiatry consult called.

## 2018-03-12 NOTE — PROGRESS NOTE ADULT - SUBJECTIVE AND OBJECTIVE BOX
Patient is a 72y old  Male who presents with a chief complaint of Left foot pain (12 Mar 2018 12:19)       INTERVAL HPI/OVERNIGHT EVENTS:  Patient seen and evaluated at bedside.  Pt is resting comfortable in NAD. Denies N/V/F/C.     Allergies    No Known Allergies    Intolerances        Vital Signs Last 24 Hrs  T(C): 36.6 (12 Mar 2018 14:49), Max: 37.4 (11 Mar 2018 23:46)  T(F): 97.9 (12 Mar 2018 14:49), Max: 99.3 (11 Mar 2018 23:46)  HR: 96 (12 Mar 2018 14:49) (93 - 103)  BP: 116/81 (12 Mar 2018 14:49) (89/52 - 123/75)  BP(mean): --  RR: 18 (12 Mar 2018 14:49) (17 - 18)  SpO2: 100% (12 Mar 2018 14:49) (94% - 100%)    LABS:                        12.3   7.70  )-----------( 251      ( 11 Mar 2018 19:10 )             42.7     03-11    142  |  99  |  23  ----------------------------<  75  Test not performed SPECIMEN GROSSLY HEMOLYZED   |  27  |  1.36<H>    Ca    9.6      11 Mar 2018 19:10    TPro  6.7  /  Alb  3.6  /  TBili  1.1  /  DBili  x   /  AST  74<H>  /  ALT  42<H>  /  AlkPhos  66  03-11    PT/INR - ( 11 Mar 2018 19:10 )   PT: 22.6 SEC;   INR: 1.94          PTT - ( 11 Mar 2018 19:10 )  PTT:35.9 SEC    CAPILLARY BLOOD GLUCOSE      POCT Blood Glucose.: 69 mg/dL (11 Mar 2018 17:07)      Lower Extremity Physical Exam:  Vascular: DP/PT 0/4, B/L, CFT <5 seconds B/L difficult to accurately assess due to dark skin, Temperature gradient warm B/L, L warmer than R. B/l +2 pitting edema to leg and foot. No change in pain with elevation or dependency  Neuro: Epicritic sensation intact to b/l feet. Allodynia noted to left dorsal midfoot extending to L proximal ankle.  Musculoskeletal/Ortho: No gross deformities to b/l foot. localized pain beginning at midshalf of metatarsals 1-5 extending proximally to the proximal Ankle join, an extends to medial and lateral mal, but not to posterior heel  Skin: No open lesions or abrasions, no loss in skin lines, or blanching    improved pain upon reassessment    RADIOLOGY & ADDITIONAL TESTS:  Uric Acid, Serum (03.11.18 @ 19:10)    Uric Acid, Serum: 9.9 mg/dL

## 2018-04-01 ENCOUNTER — INPATIENT (INPATIENT)
Facility: HOSPITAL | Age: 73
LOS: 7 days | Discharge: HOME | End: 2018-04-09
Attending: HOSPITALIST | Admitting: HOSPITALIST
Payer: MEDICARE

## 2018-04-01 VITALS
HEART RATE: 95 BPM | OXYGEN SATURATION: 95 % | DIASTOLIC BLOOD PRESSURE: 66 MMHG | TEMPERATURE: 97 F | RESPIRATION RATE: 18 BRPM | SYSTOLIC BLOOD PRESSURE: 108 MMHG

## 2018-04-01 DIAGNOSIS — M10.9 GOUT, UNSPECIFIED: ICD-10-CM

## 2018-04-01 DIAGNOSIS — I48.91 UNSPECIFIED ATRIAL FIBRILLATION: ICD-10-CM

## 2018-04-01 DIAGNOSIS — H40.9 UNSPECIFIED GLAUCOMA: ICD-10-CM

## 2018-04-01 DIAGNOSIS — E78.5 HYPERLIPIDEMIA, UNSPECIFIED: ICD-10-CM

## 2018-04-01 DIAGNOSIS — I10 ESSENTIAL (PRIMARY) HYPERTENSION: ICD-10-CM

## 2018-04-01 DIAGNOSIS — Z29.9 ENCOUNTER FOR PROPHYLACTIC MEASURES, UNSPECIFIED: ICD-10-CM

## 2018-04-01 LAB
ALBUMIN SERPL ELPH-MCNC: 3.3 G/DL — SIGNIFICANT CHANGE UP (ref 3.3–5)
ALP SERPL-CCNC: 107 U/L — SIGNIFICANT CHANGE UP (ref 40–120)
ALT FLD-CCNC: 71 U/L — HIGH (ref 4–41)
ANISOCYTOSIS BLD QL: SIGNIFICANT CHANGE UP
APTT BLD: 29.2 SEC — SIGNIFICANT CHANGE UP (ref 27.5–37.4)
APTT BLD: > 200 SEC — CRITICAL HIGH (ref 27.5–37.4)
AST SERPL-CCNC: 72 U/L — HIGH (ref 4–40)
BASOPHILS NFR SPEC: 1.9 % — SIGNIFICANT CHANGE UP (ref 0–2)
BILIRUB SERPL-MCNC: 1.2 MG/DL — SIGNIFICANT CHANGE UP (ref 0.2–1.2)
BLD GP AB SCN SERPL QL: NEGATIVE — SIGNIFICANT CHANGE UP
BUN SERPL-MCNC: 37 MG/DL — HIGH (ref 7–23)
BUN SERPL-MCNC: 40 MG/DL — HIGH (ref 7–23)
BUN SERPL-MCNC: 42 MG/DL — HIGH (ref 7–23)
CALCIUM SERPL-MCNC: 9 MG/DL — SIGNIFICANT CHANGE UP (ref 8.4–10.5)
CALCIUM SERPL-MCNC: 9.2 MG/DL — SIGNIFICANT CHANGE UP (ref 8.4–10.5)
CALCIUM SERPL-MCNC: 9.3 MG/DL — SIGNIFICANT CHANGE UP (ref 8.4–10.5)
CHLORIDE SERPL-SCNC: 100 MMOL/L — SIGNIFICANT CHANGE UP (ref 98–107)
CHLORIDE SERPL-SCNC: 99 MMOL/L — SIGNIFICANT CHANGE UP (ref 98–107)
CHLORIDE SERPL-SCNC: 99 MMOL/L — SIGNIFICANT CHANGE UP (ref 98–107)
CK MB BLD-MCNC: 3 — HIGH (ref 0–2.5)
CK MB BLD-MCNC: 4.8 NG/ML — SIGNIFICANT CHANGE UP (ref 1–6.6)
CK MB BLD-MCNC: 5.1 NG/ML — SIGNIFICANT CHANGE UP (ref 1–6.6)
CK MB BLD-MCNC: SIGNIFICANT CHANGE UP (ref 0–2.5)
CK SERPL-CCNC: 136 U/L — SIGNIFICANT CHANGE UP (ref 30–200)
CK SERPL-CCNC: 162 U/L — SIGNIFICANT CHANGE UP (ref 30–200)
CO2 SERPL-SCNC: 29 MMOL/L — SIGNIFICANT CHANGE UP (ref 22–31)
CO2 SERPL-SCNC: 30 MMOL/L — SIGNIFICANT CHANGE UP (ref 22–31)
CO2 SERPL-SCNC: 30 MMOL/L — SIGNIFICANT CHANGE UP (ref 22–31)
CREAT SERPL-MCNC: 1.45 MG/DL — HIGH (ref 0.5–1.3)
CREAT SERPL-MCNC: 1.5 MG/DL — HIGH (ref 0.5–1.3)
CREAT SERPL-MCNC: 1.78 MG/DL — HIGH (ref 0.5–1.3)
CRP SERPL-MCNC: 35.5 MG/L — HIGH
EOSINOPHIL NFR FLD: 0 % — SIGNIFICANT CHANGE UP (ref 0–6)
ERYTHROCYTE [SEDIMENTATION RATE] IN BLOOD: 5 MM/HR — SIGNIFICANT CHANGE UP (ref 1–15)
GIANT PLATELETS BLD QL SMEAR: PRESENT — SIGNIFICANT CHANGE UP
GLUCOSE SERPL-MCNC: 123 MG/DL — HIGH (ref 70–99)
GLUCOSE SERPL-MCNC: 88 MG/DL — SIGNIFICANT CHANGE UP (ref 70–99)
GLUCOSE SERPL-MCNC: 95 MG/DL — SIGNIFICANT CHANGE UP (ref 70–99)
HCT VFR BLD CALC: 39.3 % — SIGNIFICANT CHANGE UP (ref 39–50)
HCT VFR BLD CALC: 40.1 % — SIGNIFICANT CHANGE UP (ref 39–50)
HGB BLD-MCNC: 11.2 G/DL — LOW (ref 13–17)
HGB BLD-MCNC: 11.3 G/DL — LOW (ref 13–17)
HYPOCHROMIA BLD QL: SLIGHT — SIGNIFICANT CHANGE UP
INR BLD: 1.37 — HIGH (ref 0.88–1.17)
LYMPHOCYTES NFR SPEC AUTO: 3.8 % — LOW (ref 13–44)
MACROCYTES BLD QL: SIGNIFICANT CHANGE UP
MAGNESIUM SERPL-MCNC: 1.9 MG/DL — SIGNIFICANT CHANGE UP (ref 1.6–2.6)
MCHC RBC-ENTMCNC: 22.4 PG — LOW (ref 27–34)
MCHC RBC-ENTMCNC: 22.6 PG — LOW (ref 27–34)
MCHC RBC-ENTMCNC: 28.2 % — LOW (ref 32–36)
MCHC RBC-ENTMCNC: 28.5 % — LOW (ref 32–36)
MCV RBC AUTO: 79.2 FL — LOW (ref 80–100)
MCV RBC AUTO: 79.6 FL — LOW (ref 80–100)
MICROCYTES BLD QL: SLIGHT — SIGNIFICANT CHANGE UP
MONOCYTES NFR BLD: 1 % — LOW (ref 2–9)
NEUTROPHIL AB SER-ACNC: 85.7 % — HIGH (ref 43–77)
NEUTS BAND # BLD: 1.9 % — SIGNIFICANT CHANGE UP (ref 0–6)
NRBC # FLD: 0.11 — SIGNIFICANT CHANGE UP
NRBC # FLD: 0.16 — SIGNIFICANT CHANGE UP
NRBC FLD-RTO: 1.9 — SIGNIFICANT CHANGE UP
NRBC FLD-RTO: 2.5 — SIGNIFICANT CHANGE UP
PHOSPHATE SERPL-MCNC: 3.4 MG/DL — SIGNIFICANT CHANGE UP (ref 2.5–4.5)
PLATELET # BLD AUTO: 204 K/UL — SIGNIFICANT CHANGE UP (ref 150–400)
PLATELET # BLD AUTO: 208 K/UL — SIGNIFICANT CHANGE UP (ref 150–400)
PLATELET COUNT - ESTIMATE: NORMAL — SIGNIFICANT CHANGE UP
PMV BLD: SIGNIFICANT CHANGE UP FL (ref 7–13)
PMV BLD: SIGNIFICANT CHANGE UP FL (ref 7–13)
POIKILOCYTOSIS BLD QL AUTO: SLIGHT — SIGNIFICANT CHANGE UP
POLYCHROMASIA BLD QL SMEAR: SLIGHT — SIGNIFICANT CHANGE UP
POTASSIUM SERPL-MCNC: 5.1 MMOL/L — SIGNIFICANT CHANGE UP (ref 3.5–5.3)
POTASSIUM SERPL-MCNC: 5.5 MMOL/L — HIGH (ref 3.5–5.3)
POTASSIUM SERPL-MCNC: 7.3 MMOL/L — CRITICAL HIGH (ref 3.5–5.3)
POTASSIUM SERPL-SCNC: 5.1 MMOL/L — SIGNIFICANT CHANGE UP (ref 3.5–5.3)
POTASSIUM SERPL-SCNC: 5.5 MMOL/L — HIGH (ref 3.5–5.3)
POTASSIUM SERPL-SCNC: 7.3 MMOL/L — CRITICAL HIGH (ref 3.5–5.3)
PROT SERPL-MCNC: 6.7 G/DL — SIGNIFICANT CHANGE UP (ref 6–8.3)
PROTHROM AB SERPL-ACNC: 15.8 SEC — HIGH (ref 9.8–13.1)
RBC # BLD: 4.96 M/UL — SIGNIFICANT CHANGE UP (ref 4.2–5.8)
RBC # BLD: 5.04 M/UL — SIGNIFICANT CHANGE UP (ref 4.2–5.8)
RBC # FLD: 21.8 % — HIGH (ref 10.3–14.5)
RBC # FLD: 21.8 % — HIGH (ref 10.3–14.5)
RH IG SCN BLD-IMP: POSITIVE — SIGNIFICANT CHANGE UP
SMUDGE CELLS # BLD: PRESENT — SIGNIFICANT CHANGE UP
SODIUM SERPL-SCNC: 140 MMOL/L — SIGNIFICANT CHANGE UP (ref 135–145)
SODIUM SERPL-SCNC: 140 MMOL/L — SIGNIFICANT CHANGE UP (ref 135–145)
SODIUM SERPL-SCNC: 141 MMOL/L — SIGNIFICANT CHANGE UP (ref 135–145)
TARGETS BLD QL SMEAR: SLIGHT — SIGNIFICANT CHANGE UP
TROPONIN T SERPL-MCNC: 0.07 NG/ML — HIGH (ref 0–0.06)
TROPONIN T SERPL-MCNC: 0.07 NG/ML — HIGH (ref 0–0.06)
TSH SERPL-MCNC: 3.46 UIU/ML — SIGNIFICANT CHANGE UP (ref 0.27–4.2)
URATE SERPL-MCNC: 4.3 MG/DL — SIGNIFICANT CHANGE UP (ref 3.4–8.8)
VARIANT LYMPHS # BLD: 5.7 % — SIGNIFICANT CHANGE UP
WBC # BLD: 5.73 K/UL — SIGNIFICANT CHANGE UP (ref 3.8–10.5)
WBC # BLD: 6.33 K/UL — SIGNIFICANT CHANGE UP (ref 3.8–10.5)
WBC # FLD AUTO: 5.73 K/UL — SIGNIFICANT CHANGE UP (ref 3.8–10.5)
WBC # FLD AUTO: 6.33 K/UL — SIGNIFICANT CHANGE UP (ref 3.8–10.5)

## 2018-04-01 PROCEDURE — 73562 X-RAY EXAM OF KNEE 3: CPT | Mod: 26,50

## 2018-04-01 PROCEDURE — 73630 X-RAY EXAM OF FOOT: CPT | Mod: 26,LT

## 2018-04-01 PROCEDURE — 73590 X-RAY EXAM OF LOWER LEG: CPT | Mod: 26,LT

## 2018-04-01 RX ORDER — ASPIRIN/CALCIUM CARB/MAGNESIUM 324 MG
81 TABLET ORAL DAILY
Qty: 0 | Refills: 0 | Status: DISCONTINUED | OUTPATIENT
Start: 2018-04-01 | End: 2018-04-03

## 2018-04-01 RX ORDER — HEPARIN SODIUM 5000 [USP'U]/ML
3500 INJECTION INTRAVENOUS; SUBCUTANEOUS EVERY 6 HOURS
Qty: 0 | Refills: 0 | Status: DISCONTINUED | OUTPATIENT
Start: 2018-04-01 | End: 2018-04-06

## 2018-04-01 RX ORDER — LATANOPROST 0.05 MG/ML
1 SOLUTION/ DROPS OPHTHALMIC; TOPICAL AT BEDTIME
Qty: 0 | Refills: 0 | Status: DISCONTINUED | OUTPATIENT
Start: 2018-04-01 | End: 2018-04-09

## 2018-04-01 RX ORDER — MAGNESIUM SULFATE 500 MG/ML
2 VIAL (ML) INJECTION ONCE
Qty: 0 | Refills: 0 | Status: COMPLETED | OUTPATIENT
Start: 2018-04-01 | End: 2018-04-01

## 2018-04-01 RX ORDER — ATORVASTATIN CALCIUM 80 MG/1
10 TABLET, FILM COATED ORAL AT BEDTIME
Qty: 0 | Refills: 0 | Status: DISCONTINUED | OUTPATIENT
Start: 2018-04-01 | End: 2018-04-09

## 2018-04-01 RX ORDER — COLCHICINE 0.6 MG
1.2 TABLET ORAL ONCE
Qty: 0 | Refills: 0 | Status: COMPLETED | OUTPATIENT
Start: 2018-04-01 | End: 2018-04-01

## 2018-04-01 RX ORDER — NICOTINE POLACRILEX 2 MG
1 GUM BUCCAL DAILY
Qty: 0 | Refills: 0 | Status: DISCONTINUED | OUTPATIENT
Start: 2018-04-01 | End: 2018-04-09

## 2018-04-01 RX ORDER — HEPARIN SODIUM 5000 [USP'U]/ML
7000 INJECTION INTRAVENOUS; SUBCUTANEOUS ONCE
Qty: 0 | Refills: 0 | Status: COMPLETED | OUTPATIENT
Start: 2018-04-01 | End: 2018-04-01

## 2018-04-01 RX ORDER — HEPARIN SODIUM 5000 [USP'U]/ML
7000 INJECTION INTRAVENOUS; SUBCUTANEOUS EVERY 6 HOURS
Qty: 0 | Refills: 0 | Status: DISCONTINUED | OUTPATIENT
Start: 2018-04-01 | End: 2018-04-06

## 2018-04-01 RX ORDER — OXYCODONE AND ACETAMINOPHEN 5; 325 MG/1; MG/1
1 TABLET ORAL EVERY 6 HOURS
Qty: 0 | Refills: 0 | Status: DISCONTINUED | OUTPATIENT
Start: 2018-04-01 | End: 2018-04-02

## 2018-04-01 RX ORDER — PANTOPRAZOLE SODIUM 20 MG/1
40 TABLET, DELAYED RELEASE ORAL
Qty: 0 | Refills: 0 | Status: DISCONTINUED | OUTPATIENT
Start: 2018-04-01 | End: 2018-04-04

## 2018-04-01 RX ORDER — NICOTINE POLACRILEX 2 MG
2 GUM BUCCAL
Qty: 0 | Refills: 0 | Status: DISCONTINUED | OUTPATIENT
Start: 2018-04-01 | End: 2018-04-09

## 2018-04-01 RX ORDER — HYDROMORPHONE HYDROCHLORIDE 2 MG/ML
1 INJECTION INTRAMUSCULAR; INTRAVENOUS; SUBCUTANEOUS ONCE
Qty: 0 | Refills: 0 | Status: DISCONTINUED | OUTPATIENT
Start: 2018-04-01 | End: 2018-04-01

## 2018-04-01 RX ORDER — SODIUM CHLORIDE 9 MG/ML
1000 INJECTION INTRAMUSCULAR; INTRAVENOUS; SUBCUTANEOUS
Qty: 0 | Refills: 0 | Status: COMPLETED | OUTPATIENT
Start: 2018-04-01 | End: 2018-04-02

## 2018-04-01 RX ORDER — COLCHICINE 0.6 MG
0.6 TABLET ORAL ONCE
Qty: 0 | Refills: 0 | Status: DISCONTINUED | OUTPATIENT
Start: 2018-04-01 | End: 2018-04-01

## 2018-04-01 RX ORDER — CLOPIDOGREL BISULFATE 75 MG/1
75 TABLET, FILM COATED ORAL DAILY
Qty: 0 | Refills: 0 | Status: DISCONTINUED | OUTPATIENT
Start: 2018-04-01 | End: 2018-04-01

## 2018-04-01 RX ORDER — TRAMADOL HYDROCHLORIDE 50 MG/1
50 TABLET ORAL EVERY 6 HOURS
Qty: 0 | Refills: 0 | Status: DISCONTINUED | OUTPATIENT
Start: 2018-04-01 | End: 2018-04-02

## 2018-04-01 RX ORDER — HEPARIN SODIUM 5000 [USP'U]/ML
INJECTION INTRAVENOUS; SUBCUTANEOUS
Qty: 25000 | Refills: 0 | Status: DISCONTINUED | OUTPATIENT
Start: 2018-04-01 | End: 2018-04-06

## 2018-04-01 RX ORDER — METOPROLOL TARTRATE 50 MG
25 TABLET ORAL
Qty: 0 | Refills: 0 | Status: DISCONTINUED | OUTPATIENT
Start: 2018-04-01 | End: 2018-04-02

## 2018-04-01 RX ADMIN — Medication 25 MILLIGRAM(S): at 14:12

## 2018-04-01 RX ADMIN — HYDROMORPHONE HYDROCHLORIDE 1 MILLIGRAM(S): 2 INJECTION INTRAMUSCULAR; INTRAVENOUS; SUBCUTANEOUS at 11:30

## 2018-04-01 RX ADMIN — HEPARIN SODIUM 1300 UNIT(S)/HR: 5000 INJECTION INTRAVENOUS; SUBCUTANEOUS at 23:31

## 2018-04-01 RX ADMIN — ATORVASTATIN CALCIUM 10 MILLIGRAM(S): 80 TABLET, FILM COATED ORAL at 22:06

## 2018-04-01 RX ADMIN — LATANOPROST 1 DROP(S): 0.05 SOLUTION/ DROPS OPHTHALMIC; TOPICAL at 22:08

## 2018-04-01 RX ADMIN — Medication 50 GRAM(S): at 11:30

## 2018-04-01 RX ADMIN — HEPARIN SODIUM 1600 UNIT(S)/HR: 5000 INJECTION INTRAVENOUS; SUBCUTANEOUS at 14:32

## 2018-04-01 RX ADMIN — Medication 1.2 MILLIGRAM(S): at 10:21

## 2018-04-01 RX ADMIN — HYDROMORPHONE HYDROCHLORIDE 1 MILLIGRAM(S): 2 INJECTION INTRAMUSCULAR; INTRAVENOUS; SUBCUTANEOUS at 10:36

## 2018-04-01 RX ADMIN — SODIUM CHLORIDE 75 MILLILITER(S): 9 INJECTION INTRAMUSCULAR; INTRAVENOUS; SUBCUTANEOUS at 14:33

## 2018-04-01 RX ADMIN — Medication 40 MILLIGRAM(S): at 14:17

## 2018-04-01 RX ADMIN — HEPARIN SODIUM 7000 UNIT(S): 5000 INJECTION INTRAVENOUS; SUBCUTANEOUS at 14:32

## 2018-04-01 RX ADMIN — CLOPIDOGREL BISULFATE 75 MILLIGRAM(S): 75 TABLET, FILM COATED ORAL at 14:12

## 2018-04-01 RX ADMIN — Medication 81 MILLIGRAM(S): at 14:29

## 2018-04-01 RX ADMIN — HEPARIN SODIUM 0 UNIT(S)/HR: 5000 INJECTION INTRAVENOUS; SUBCUTANEOUS at 22:04

## 2018-04-01 NOTE — ED ADULT NURSE REASSESSMENT NOTE - NS ED NURSE REASSESS COMMENT FT1
patient sleeping, placed on 4L NC prophylactic as unable to get good wave form, O2 sat 100%, BP holding with MAP remaining above 65, will monitor closely

## 2018-04-01 NOTE — H&P ADULT - RS GEN PE MLT RESP DETAILS PC
airway patent/breath sounds equal/no rales/no wheezes/clear to auscultation bilaterally/no rhonchi/respirations non-labored/no chest wall tenderness

## 2018-04-01 NOTE — H&P ADULT - NSHPLABSRESULTS_GEN_ALL_CORE
EKG: Afib @ 150 bpm  BUN/Cr: 37/1.50  AST/ALT: 72/71  CRP: 35.5    Xray left foot: No acute fracture or dislocation. Chronic healed fracture of the left   distal fibula with mild residual misalignment. No ankle joint effusion. Mild soft tissue swelling over the plantar and dorsal aspect of the foot.  Xray Left Tibia+Fibula: Vascular stent overlies the right femur/thigh. No acute fracture or dislocation. Healed fracture of the left distal fibula. Small left knee joint effusion. Soft tissue swelling is seen along the   anterior aspect of the left femur. Extensive vascular calcifications are seen bilaterally.  Xray bilateral knees: Vascular stent overlies the right femur/thigh. No acute fracture or dislocation. Healed fracture of the left distal fibula. Small left knee joint effusion. Soft tissue swelling is seen along the   anterior aspect of the left femur. Extensive vascular calcifications are seen bilaterally.

## 2018-04-01 NOTE — CONSULT NOTE ADULT - SUBJECTIVE AND OBJECTIVE BOX
CC: r/o L septic knee  HPI:   72yMale hx of HTN, HLD, CAD s//p stent, PAD s/p fem-pop bypass, CKD3, here for L foot pain. Fell from bed 2 days ago. States that the pain is an electric burning feeling, initially when he was here also had skin sensitivity. Denies having a similar episode in the past. Recently admitte here for an acute gout flare. patient states that at baseline, he ambulates independently. No other sources of pain at this time. No recent illness. Able to weight bear without pain. Able to flex and extend his left knee.     Review of systems: As per HPI, otherwise negative.     PAST MEDICAL & SURGICAL HISTORY:  PVD (peripheral vascular disease)  CAD (coronary artery disease): stented coronary artery 2001  Pneumonia: childhood, no reoccurance  Atherosclerosis of arteries: right leg.  Prostate Ca  Dyslipidemia  HTN (Hypertension), Benign  Glaucoma  Stented coronary artery: 2001  Peripheral vascular disease: s/p right femoral popliteal bypass about 2 years ago  S/P lumbar laminectomy: 2010, L4 L5  S/P prostatectomy: for prostate cancer, several years ago, no chemo/radiation therapy  Dyslipidemia: s/p insertion of coronary stent more than 10 years ago    FAMILY HISTORY:  Family history of diabetes mellitus (DM) (Child)      MEDICATIONS  (STANDING):    MEDICATIONS  (PRN):      T(C): 37.1 (04-01-18 @ 08:03), Max: 37.1 (04-01-18 @ 08:03)  HR: 124 (04-01-18 @ 11:17) (95 - 140)  BP: 109/88 (04-01-18 @ 11:17) (94/65 - 115/50)  RR: 155 (04-01-18 @ 11:17) (16 - 155)  SpO2: 100% (04-01-18 @ 11:17) (95% - 100%)  Wt(kg): --                        11.2   5.73  )-----------( 204      ( 01 Apr 2018 09:01 )             39.3     04-01    140  |  99  |  37<H>  ----------------------------<  88  5.5<H>   |  29  |  1.50<H>    Ca    9.0      01 Apr 2018 09:32  Phos  3.4     04-01  Mg     1.9     04-01    TPro  6.7  /  Alb  3.3  /  TBili  1.2  /  DBili  x   /  AST  72<H>  /  ALT  71<H>  /  AlkPhos  107  04-01    ESR 5, CRP 36    EXAM:  Awake, Alert and in no acute distress  LLE old anterior knee excoriation/bruise, L knee mild effusion, no erythema or increased warmth to touch  Active and passive knee ROM 0-120 deg w/o pain  Mild calf TTP  Ankle full active and passive ROM without pain  Foot and toes painful with palpation, poorly localized  Compartments soft and compressible    Imaging  XR L knee - no acute fx or dislocation, bypass graft

## 2018-04-01 NOTE — ED ADULT NURSE REASSESSMENT NOTE - NS ED NURSE REASSESS COMMENT FT1
patient continues to be in extreme pain, 10 out of 10 with only slightest touch to b/l LE, patient initially refusing pain medication, became tachycardic 130-140's, MD notified, EKG done, patient now willing to accept pain medication and medication for gout flare up, medicated as ordered, will monitor closely.

## 2018-04-01 NOTE — CONSULT NOTE ADULT - ASSESSMENT
72M with L foot pain, consulted for r/o L septic knee. Patient able to weight bear with excellent painless range of motion. ESR 5, CRP 36. Afebrile, no white count.  - Low concern for L septic knee  - No aspiration indicated at this time  - Pain control  - Can consider vascular eval  - No ortho surgical intervention  - No outpatient follow-up necessary

## 2018-04-01 NOTE — ED ADULT TRIAGE NOTE - CHIEF COMPLAINT QUOTE
Patient c/o fall this AM. Patient states "when I woke up, Instead of the head of the bed I was at the end of the bed and slipped off". Patient denies hitting his head or LOC. Patient c/o B/L Knee pain and B/L feet. Bandage noted to left knee. Patient reports hx. stent in right leg.

## 2018-04-01 NOTE — PATIENT PROFILE ADULT. - REASON FOR ADMISSION
Pt noticed that he couldn't walk since Friday night Saturday morning. Left foot swelling noted. Positive pulses, positive capillary refill and mobility. Pt states he is unable to walk with left foot due to swelling.

## 2018-04-01 NOTE — H&P ADULT - ASSESSMENT
71 yo male PMHx of HTN, Gout, HLD, CAD with stent "years ago," PAD with RLE stent, and CKD3 p/w L 1st and 2nd toe pain, s/p unwitnessed fall 2 days ago, found to be rapid afib @ 150s bpm, admitted to tele for Rapid Afib, r/o ACS and Gout Exacerbation.    +Rapid Afib  +Gout Exacerbation-->started on prednisone

## 2018-04-01 NOTE — ED ADULT NURSE REASSESSMENT NOTE - NS ED NURSE REASSESS COMMENT FT1
8am: took report from night RN, as per triage note patient has b/l knee, LE pain, patient states he has hx gout, IV access obtained lft hand, 20g, labs sent, no medication at this time, patient waiting for xrays.

## 2018-04-01 NOTE — ED PROVIDER NOTE - ATTENDING CONTRIBUTION TO CARE
72M h/o HTN, HL, CAD, stents, PVD, CKD, gout here with acute on chronic left foot pain that is sharp electric tingling, intermittent. Denies fever, focal weakness/numbness, trauma. Left knee has anterior puncture wound that is chronic per patient. Left knee is swollen, red, warm, tender, able to range. Bilateral feet with pitting edema, intact sensation, motor, ROM. Pain control for possible gout. Eval left knee for infection, concern for cellulitis and possible septic joint, ortho consult. No signs of sepsis. Reassess.

## 2018-04-01 NOTE — ED ADULT NURSE NOTE - OBJECTIVE STATEMENT
72yoM a&ox4 typically amb at baseline presents to ED 26 non ambulatory 2/2 fall out of bed 2 days ago. pt reports he woke up "upside down in the bed, tried to go to the bathroom, fell onto the floor" pt appears in excrutiating pain to both legs, barely able to touch feet without pain. pt maintains sensation and ability to move feet. denies cp, sob, dizziness, lightheadedness, n/v/d, fever/chills. breathing even/unlabored. vss. awaiting MD weaver. pt has lac to L knee from fall. will continue to monitor.

## 2018-04-01 NOTE — ED PROVIDER NOTE - PROGRESS NOTE DETAILS
Pt had HR 150s, aflutter likely in setting of pain, convinced pt to take meds will give 1 mg dilaudid at this time HR 110s now, pt in less pain, pending ortho eval. will admit to tele under [facility]

## 2018-04-01 NOTE — ED PROVIDER NOTE - MEDICAL DECISION MAKING DETAILS
will work up for fx vs. gouty flare, check labs, xrays of bilateral knees given recent fall wioth injury, as well as L foot given severity of pain and swelling. Check uric acid, pt refused pain control at this time

## 2018-04-01 NOTE — H&P ADULT - PROBLEM SELECTOR PLAN 1
tele monitor   cardiac enzymes x 2  Serial EKGs  CHADS2 score =1  Will start Heparin drip.  Monitor APTT as per routine  Maintain falls and bleeding precautions.  TTE  Metoprolol 25mg po BID

## 2018-04-01 NOTE — H&P ADULT - ATTENDING COMMENTS
Chart reviewed. Vitals and Labs noted.   Pt seen and examined at bedside. Plan formulated with the resident/PA/NP. Detail H&P as above.     Admitted for left first and second toe pain.   Recent discharge from [facility] after giving a dose of colchicine. That time, he was seen by Podiatry.  Pt is a vasculopathic with right sided LE stents. (outpt Vascular Dr. Artem Figueroa).  Cardiology outpt is Dr. Lam () in [place]. Message left. Remote history of cardiac stent.  In the ED, intense foot pain, xrays neg for fracture. Complicated by a.fib with RVR, no known history.  Started on Hep drip given Cr of 1.5. (back in 2017 1.2-3).   PMHx of HTN, Gout, HLD, CAD with stent "years ago," PAD with RLE stent, and CKD3   Gentle IVF and monitor Cr.   Left foot toe pain: gout vs. neuropathic pain (tender to light touch)  pt s/p colchicine in the ED. given CKD, will give a trial of prednisone 40 mg x 5 days course for gout.   (uric acid 9.9 last week, today was within normal limit).   If no improvement will consider Rheumatology.   Ortho saw him for knee effusions, no surgical intervention.  Pain control with Tramadol PRN.  Physical therapy.  Tele monitoring.   Cardiology eval. c/w Aspirin. (recently his NOAC was discontinued and swichted to Plavix).   Currently on Hep drip. Will likely need to give coumadin vs. NOAC once Cr improves.  DVT ppx    - Dr. ADINA King ([facility])  - [phone] Chart reviewed. Vitals and Labs noted.   Pt seen and examined at bedside. Plan formulated with the resident/PA/NP. Detail H&P as above.     Admitted for left first and second toe pain.   Recent discharge from [facility] after giving a dose of colchicine. That time, he was seen by Podiatry.  Left foot toe pain: gout vs. neuropathic pain (tender to light touch)  pt s/p colchicine in the ED. given CKD, will give a trial of prednisone 40 mg x 5 days course for gout.   (uric acid 9.9 last week, today was within normal limit).   If no improvement will consider Rheumatology.   Ortho saw him for knee effusions, no surgical intervention.  Pain control with Tramadol PRN.  Physical therapy.  Pt is also a vasculopathic with right sided LE stents. (outpt Vascular Dr. Artem Figueroa).  Cardiology outpt is Dr. Lam () in [place]. Message left. Remote history of cardiac stent.  In the ED, intense foot pain, xrays neg for fracture. Complicated by a.fib/flutter with RVR, no known history.  Started on Hep drip given Cr of 1.5. (back in 2017 1.2-3).   PMHx of HTN, Gout, HLD, CAD with stent "years ago," PAD with RLE stent, and CKD3   Gentle IVF for acute on chronic renal faliure. Monitor Cr.   Tele monitoring.   Cardiology eval. c/w Aspirin. (recently his NOAC was discontinued and swichted to Plavix).   Currently on Hep drip. Will likely need to give coumadin vs. NOAC once Cr improves.  DVT ppx    - Dr[facility] HOLDEN Htet [facility]  - [phone]

## 2018-04-01 NOTE — ED PROVIDER NOTE - OBJECTIVE STATEMENT
Called Pt to check in 24 hours after discharge. No questions or concerns. This writer will schedule Pt's follow up appts.    73 yo M HTN, HLD, CAD s/p stent many years ago, PAD s/p RLE stent s/p fem-pop bypass, and CKD stage 3? (baseline unclear) who presents to ED for 10/10 pain to left foot pain for 1 day duration after fall from bed 2 days ago. Pt here on 3/11 with similar episode was admitted for acute gout flare, but patient states "I do not take medications" so likely that patient has been medication-noncompliant.    Pt slipped and fell out of bed 2 nights ago, hit both knees and had a skin tear to L knee, now pt with exquisite tenderness to L knee and the left foot.    Pt feels as tho the thin bed sheet is very painful when resting on his foot. Denies any fevers/chills/no head trauma no LOC/CP/SOB

## 2018-04-01 NOTE — H&P ADULT - PMH
Atherosclerosis of arteries  right leg.  CAD (coronary artery disease)  stented coronary artery 2001  Dyslipidemia    Glaucoma    Gout    HTN (Hypertension), Benign    Pneumonia  childhood, no reoccurance  Prostate Ca    PVD (peripheral vascular disease)

## 2018-04-01 NOTE — H&P ADULT - HISTORY OF PRESENT ILLNESS
73 yo male PMHx of HTN, Gout, HLD, CAD with stent "years ago," PAD with RLE stent, and CKD3 p/w L 1st and 2nd toe pain, s/p unwitnessed fall 2 days ago. Toe pain was 10/10, sharp, throbbing pain since Thursday (4 days) but worse today, so he drove himself to [facility] for treatment and evaluation. Pt admits to unwitnessed fall 2 days, where he woke up on the opposite side of the bed. Upon fixing himself back in the bed he fell down on the floor, on blanket. He denies fever, chills, incontinence, chest pain, sob, or palpitations.    In ED pt found to be rapid afib @ 150s bpm. He received IV Dilaudid and PO colchicine for pain with some relief.

## 2018-04-02 DIAGNOSIS — M79.675 PAIN IN LEFT TOE(S): ICD-10-CM

## 2018-04-02 DIAGNOSIS — N17.9 ACUTE KIDNEY FAILURE, UNSPECIFIED: ICD-10-CM

## 2018-04-02 DIAGNOSIS — R60.0 LOCALIZED EDEMA: ICD-10-CM

## 2018-04-02 LAB
APPEARANCE UR: SIGNIFICANT CHANGE UP
APTT BLD: 176.6 SEC — CRITICAL HIGH (ref 27.5–37.4)
APTT BLD: 84.4 SEC — HIGH (ref 27.5–37.4)
APTT BLD: 95.9 SEC — HIGH (ref 27.5–37.4)
BACTERIA # UR AUTO: HIGH
BILIRUB UR-MCNC: NEGATIVE — SIGNIFICANT CHANGE UP
BLOOD UR QL VISUAL: NEGATIVE — SIGNIFICANT CHANGE UP
BUN SERPL-MCNC: 46 MG/DL — HIGH (ref 7–23)
CALCIUM SERPL-MCNC: 8.9 MG/DL — SIGNIFICANT CHANGE UP (ref 8.4–10.5)
CHLORIDE SERPL-SCNC: 96 MMOL/L — LOW (ref 98–107)
CO2 SERPL-SCNC: 30 MMOL/L — SIGNIFICANT CHANGE UP (ref 22–31)
COLOR SPEC: YELLOW — SIGNIFICANT CHANGE UP
CREAT ?TM UR-MCNC: 322.1 MG/DL — SIGNIFICANT CHANGE UP
CREAT SERPL-MCNC: 1.97 MG/DL — HIGH (ref 0.5–1.3)
EOSINOPHIL NFR URNS MANUAL: SIGNIFICANT CHANGE UP (ref 0–0)
GLUCOSE SERPL-MCNC: 102 MG/DL — HIGH (ref 70–99)
GLUCOSE UR-MCNC: NEGATIVE — SIGNIFICANT CHANGE UP
HCT VFR BLD CALC: 38.4 % — LOW (ref 39–50)
HGB BLD-MCNC: 11 G/DL — LOW (ref 13–17)
HYALINE CASTS # UR AUTO: >50 — SIGNIFICANT CHANGE UP (ref 0–?)
KETONES UR-MCNC: NEGATIVE — SIGNIFICANT CHANGE UP
LEUKOCYTE ESTERASE UR-ACNC: NEGATIVE — SIGNIFICANT CHANGE UP
MCHC RBC-ENTMCNC: 22.6 PG — LOW (ref 27–34)
MCHC RBC-ENTMCNC: 28.6 % — LOW (ref 32–36)
MCV RBC AUTO: 78.9 FL — LOW (ref 80–100)
MUCOUS THREADS # UR AUTO: SIGNIFICANT CHANGE UP
NITRITE UR-MCNC: NEGATIVE — SIGNIFICANT CHANGE UP
NON-SQ EPI CELLS # UR AUTO: 1 — SIGNIFICANT CHANGE UP
NRBC # FLD: 0.09 — SIGNIFICANT CHANGE UP
NRBC FLD-RTO: 1.7 — SIGNIFICANT CHANGE UP
PH UR: 5 — SIGNIFICANT CHANGE UP (ref 4.6–8)
PLATELET # BLD AUTO: 188 K/UL — SIGNIFICANT CHANGE UP (ref 150–400)
PMV BLD: SIGNIFICANT CHANGE UP FL (ref 7–13)
POTASSIUM SERPL-MCNC: 4.9 MMOL/L — SIGNIFICANT CHANGE UP (ref 3.5–5.3)
POTASSIUM SERPL-SCNC: 4.9 MMOL/L — SIGNIFICANT CHANGE UP (ref 3.5–5.3)
PROT UR-MCNC: 100 MG/DL — SIGNIFICANT CHANGE UP
RBC # BLD: 4.87 M/UL — SIGNIFICANT CHANGE UP (ref 4.2–5.8)
RBC # FLD: 21.4 % — HIGH (ref 10.3–14.5)
RBC CASTS # UR COMP ASSIST: SIGNIFICANT CHANGE UP (ref 0–?)
SODIUM SERPL-SCNC: 138 MMOL/L — SIGNIFICANT CHANGE UP (ref 135–145)
SODIUM UR-SCNC: 32 MMOL/L — SIGNIFICANT CHANGE UP
SP GR SPEC: 1.03 — SIGNIFICANT CHANGE UP (ref 1–1.04)
SQUAMOUS # UR AUTO: SIGNIFICANT CHANGE UP
UROBILINOGEN FLD QL: NORMAL MG/DL — SIGNIFICANT CHANGE UP
WBC # BLD: 5.35 K/UL — SIGNIFICANT CHANGE UP (ref 3.8–10.5)
WBC # FLD AUTO: 5.35 K/UL — SIGNIFICANT CHANGE UP (ref 3.8–10.5)
WBC UR QL: SIGNIFICANT CHANGE UP (ref 0–?)

## 2018-04-02 RX ORDER — METOPROLOL TARTRATE 50 MG
50 TABLET ORAL
Qty: 0 | Refills: 0 | Status: DISCONTINUED | OUTPATIENT
Start: 2018-04-02 | End: 2018-04-06

## 2018-04-02 RX ORDER — SODIUM CHLORIDE 9 MG/ML
1000 INJECTION INTRAMUSCULAR; INTRAVENOUS; SUBCUTANEOUS
Qty: 0 | Refills: 0 | Status: DISCONTINUED | OUTPATIENT
Start: 2018-04-02 | End: 2018-04-02

## 2018-04-02 RX ADMIN — TRAMADOL HYDROCHLORIDE 50 MILLIGRAM(S): 50 TABLET ORAL at 01:56

## 2018-04-02 RX ADMIN — Medication 40 MILLIGRAM(S): at 05:43

## 2018-04-02 RX ADMIN — SODIUM CHLORIDE 75 MILLILITER(S): 9 INJECTION INTRAMUSCULAR; INTRAVENOUS; SUBCUTANEOUS at 03:08

## 2018-04-02 RX ADMIN — OXYCODONE AND ACETAMINOPHEN 1 TABLET(S): 5; 325 TABLET ORAL at 06:06

## 2018-04-02 RX ADMIN — TRAMADOL HYDROCHLORIDE 50 MILLIGRAM(S): 50 TABLET ORAL at 03:10

## 2018-04-02 RX ADMIN — Medication 1 PATCH: at 12:08

## 2018-04-02 RX ADMIN — LATANOPROST 1 DROP(S): 0.05 SOLUTION/ DROPS OPHTHALMIC; TOPICAL at 21:18

## 2018-04-02 RX ADMIN — PANTOPRAZOLE SODIUM 40 MILLIGRAM(S): 20 TABLET, DELAYED RELEASE ORAL at 05:43

## 2018-04-02 RX ADMIN — ATORVASTATIN CALCIUM 10 MILLIGRAM(S): 80 TABLET, FILM COATED ORAL at 21:18

## 2018-04-02 RX ADMIN — Medication 50 MILLIGRAM(S): at 17:52

## 2018-04-02 RX ADMIN — HEPARIN SODIUM 0 UNIT(S)/HR: 5000 INJECTION INTRAVENOUS; SUBCUTANEOUS at 15:18

## 2018-04-02 RX ADMIN — Medication 81 MILLIGRAM(S): at 12:08

## 2018-04-02 RX ADMIN — HEPARIN SODIUM 1000 UNIT(S)/HR: 5000 INJECTION INTRAVENOUS; SUBCUTANEOUS at 16:26

## 2018-04-02 RX ADMIN — SODIUM CHLORIDE 75 MILLILITER(S): 9 INJECTION INTRAMUSCULAR; INTRAVENOUS; SUBCUTANEOUS at 12:21

## 2018-04-02 RX ADMIN — HEPARIN SODIUM 1300 UNIT(S)/HR: 5000 INJECTION INTRAVENOUS; SUBCUTANEOUS at 08:13

## 2018-04-02 RX ADMIN — HEPARIN SODIUM 1000 UNIT(S)/HR: 5000 INJECTION INTRAVENOUS; SUBCUTANEOUS at 23:28

## 2018-04-02 RX ADMIN — OXYCODONE AND ACETAMINOPHEN 1 TABLET(S): 5; 325 TABLET ORAL at 03:30

## 2018-04-02 RX ADMIN — Medication 25 MILLIGRAM(S): at 06:55

## 2018-04-02 NOTE — CONSULT NOTE ADULT - SUBJECTIVE AND OBJECTIVE BOX
CHIEF COMPLAINT:  falling afib edema PVD Pateint is a poor historian  HISTORY OF PRESENT ILLNESS:  HPI:  71 yo male PMHx of HTN, Gout, HLD, CAD with stent "years ago," PAD with RLE stent, and CKD3 p/w L 1st and 2nd toe pain, s/p unwitnessed fall 2 days ago. Toe pain was 10/10, sharp, throbbing pain since Thursday (4 days) but worse today, so he drove himself to [facility] for treatment and evaluation. Pt admits to unwitnessed fall 2 days, where he woke up on the opposite side of the bed. Upon fixing himself back in the bed he fell down on the floor, on blanket. He denies fever, chills, incontinence, chest pain, sob, or palpitations.    In ED pt found to be rapid afib @ 150s bpm. He received IV Dilaudid and PO colchicine for pain with some relief. (01 Apr 2018 14:04)  today patient is in no acute distress  but still in atrial fibrillation with a better controlled heart rate. He is a poor historian but he denies any shortness of breath or chest pain.. He does have chronic edema of his lower extremities. The pain in his toes seems to have improved on steroids.  PAST MEDICAL & SURGICAL HISTORY:  Gout  PVD (peripheral vascular disease)  CAD (coronary artery disease): stented coronary artery 2001  Pneumonia: childhood, no reoccurance  Atherosclerosis of arteries: right leg.  Prostate Ca  Dyslipidemia  HTN (Hypertension), Benign  Glaucoma  Stented coronary artery: 2001  Peripheral vascular disease: s/p right femoral popliteal bypass about 2 years ago  S/P lumbar laminectomy: 2010, L4 L5  S/P prostatectomy: for prostate cancer, several years ago, no chemo/radiation therapy          MEDICATIONS:  aspirin enteric coated 81 milliGRAM(s) Oral daily  heparin  Infusion.  Unit(s)/Hr IV Continuous <Continuous>  heparin  Injectable 7000 Unit(s) IV Push every 6 hours PRN  heparin  Injectable 3500 Unit(s) IV Push every 6 hours PRN  metoprolol tartrate 50 milliGRAM(s) Oral two times a day        oxyCODONE    5 mG/acetaminophen 325 mG 1 Tablet(s) Oral every 6 hours PRN  traMADol 50 milliGRAM(s) Oral every 6 hours PRN    pantoprazole    Tablet 40 milliGRAM(s) Oral before breakfast    atorvastatin 10 milliGRAM(s) Oral at bedtime  predniSONE   Tablet 40 milliGRAM(s) Oral daily    latanoprost 0.005% Ophthalmic Solution 1 Drop(s) Both EYES at bedtime      FAMILY HISTORY:  Family history of diabetes mellitus (DM) (Child)      Allergies    No Known Allergies    Intolerances    	    PHYSICAL EXAM:  T(C): 36.5 (04-02-18 @ 17:45), Max: 36.8 (04-02-18 @ 01:31)  HR: 123 (04-02-18 @ 17:45) (121 - 123)  BP: 114/66 (04-02-18 @ 17:45) (93/64 - 114/88)  RR: 18 (04-02-18 @ 17:45) (16 - 19)  SpO2: 100% (04-02-18 @ 17:45) (97% - 100%)  Wt(kg): --  I&O's Summary    01 Apr 2018 07:01  -  02 Apr 2018 07:00  --------------------------------------------------------  IN: 0 mL / OUT: 225 mL / NET: -225 mL        Appearance: Normal, awake alert, oriented this no complaints oriented to place and time  somewhat garbled speech	  HEENT:   Normal oral mucosa, PERRL, EOMI	  Cardiovascular: Normal S1 S2,irregularly irregular No JVD, No murmurs  Respiratory: Lungs clear to auscultation	  Gastrointestinal:  Soft, Non-tender, + BS	  Skin: No rashes, No ecchymoses, No cyanosis	  Neurologic: Non-focal  Extremities: 2+ edema bilaterally      TELEMETRY: 	    ECG:  atrial fibrillation no acute changes	  RADIOLOGY:  2015 echo	  < from: Transthoracic Echocardiogram (06.29.15 @ 09:14) >  CONCLUSIONS:  1. Normal mitral valve. Minimal mitral regurgitation.  2. Poor visualization of the AV. Calcified trileaflet  aortic valve with probably normal opening.  3. Normal left atrium.  4. Normal left ventricular internal dimensions and wall  thickness.  5. Normal left ventricular systolic function. EF 65-70%. No  segmental wall motion abnormalities.  6. Normal right atrium.  7. Normal right ventricular size and function.  ------------------------------------------------------------------------  Confirmed on  6/29/2015 - 11:20:10 by Brett Thurman MD  ------------------------------------------------------------------------    < from: Xray Chest 1 View AP/PA (03.11.18 @ 19:14) >  he heart size is normal.   The lungs are clear. There are no pleural effusions or pneumothorax.  The bones are unremarkable.  Elevated left hemidiaphragm.    IMPRESSION:   Clear lungs.          	  LABS:	 	    CARDIAC MARKERS:  Troponin T, Serum: 0.07 ng/mL (04-01 @ 14:46)  Troponin T, Serum: 0.07 ng/mL (04-01 @ 09:32)      CKMB: 5.10 ng/mL (04-01 @ 14:46)  CKMB: 4.80 ng/mL (04-01 @ 09:32)    CKMB Relative Index: Test not performed (04-01 @ 14:46)  CKMB Relative Index: 3.0 (04-01 @ 09:32)                            11.0   5.35  )-----------( 188      ( 02 Apr 2018 05:15 )             38.4     04-02    138  |  96<L>  |  46<H>  ----------------------------<  102<H>  4.9   |  30  |  1.97<H>    Ca    8.9      02 Apr 2018 05:15  Phos  3.4     04-01  Mg     1.9     04-01    TPro  6.7  /  Alb  3.3  /  TBili  1.2  /  DBili  x   /  AST  72<H>  /  ALT  71<H>  /  AlkPhos  107  04-01    proBNP:   Lipid Profile:   HgA1c:   TSH:

## 2018-04-02 NOTE — CONSULT NOTE ADULT - PROBLEM SELECTOR RECOMMENDATION 9
renal function worsen, unclear baseline. patient denied any hx of CKD.  check urine eos, cr, na, ua, renal US  was on Lotrel at home which is on hold now.   MARY GRACE possible ATN sec to a.fib  continue to monitor renal function worsen, unclear baseline. patient denied any hx of CKD.  check urine eos, cr, na, ua, renal US  was on Lotrel at home which is on hold now.   MARY GRACE possible ATN sec to a.fib  continue to monitor  check pth, phos renal function worsen, unclear baseline. patient denied any hx of CKD.  check urine eos, cr, na, ua, renal US  was on Lotrel at home which is on hold now.   MARY GRACE possible ATN sec to a.fib, prerenal less likely s/p IV x 1L yesterday, renal function still worsen. hypervolemic on exam d/c IVF.   continue to monitor  check pth, phos renal function worsening, unclear baseline. patient denied any hx of CKD.  check urine eos, cr, na, ua, renal US  was on Lotrel at home which is on hold now.   MARY GRACE possible ATN sec to a. fib, prerenal less likely s/p IV x 1L yesterday, renal function still worsen. hypervolemic on exam d/c IVF.   continue to monitor  check pth, phos

## 2018-04-02 NOTE — CONSULT NOTE ADULT - SUBJECTIVE AND OBJECTIVE BOX
Patient is a 72y old  Male who presents with a chief complaint of left foot pain and new rapid afib (01 Apr 2018 14:04)      HPI:  71 yo male PMHx of HTN, Gout, HLD, CAD with stent "years ago," PAD with RLE stent, and CKD3 p/w L 1st and 2nd toe pain, s/p unwitnessed fall 2 days ago. Toe pain was 10/10, sharp, throbbing pain since Thursday (4 days) but worse today, so he drove himself to [facility] for treatment and evaluation. Pt admits to unwitnessed fall 2 days, where he woke up on the opposite side of the bed. Upon fixing himself back in the bed he fell down on the floor, on blanket. He denies fever, chills, incontinence, chest pain, sob, or palpitations.    In ED pt found to be rapid afib @ 150s bpm. He received IV Dilaudid and PO colchicine for pain with some relief. (01 Apr 2018 14:04)      PAST MEDICAL & SURGICAL HISTORY:  Gout  PVD (peripheral vascular disease)  CAD (coronary artery disease): stented coronary artery 2001  Pneumonia: childhood, no reoccurance  Atherosclerosis of arteries: right leg.  Prostate Ca  Dyslipidemia  HTN (Hypertension), Benign  Glaucoma  Stented coronary artery: 2001  Peripheral vascular disease: s/p right femoral popliteal bypass about 2 years ago  S/P lumbar laminectomy: 2010, L4 L5  S/P prostatectomy: for prostate cancer, several years ago, no chemo/radiation therapy      MEDICATIONS  (STANDING):  aspirin enteric coated 81 milliGRAM(s) Oral daily  atorvastatin 10 milliGRAM(s) Oral at bedtime  heparin  Infusion.  Unit(s)/Hr (16 mL/Hr) IV Continuous <Continuous>  latanoprost 0.005% Ophthalmic Solution 1 Drop(s) Both EYES at bedtime  metoprolol tartrate 25 milliGRAM(s) Oral two times a day  nicotine -   7 mG/24Hr(s) Patch 1 Patch Transdermal daily  pantoprazole    Tablet 40 milliGRAM(s) Oral before breakfast  predniSONE   Tablet 40 milliGRAM(s) Oral daily  sodium chloride 0.9%. 1000 milliLiter(s) (75 mL/Hr) IV Continuous <Continuous>    MEDICATIONS  (PRN):  heparin  Injectable 7000 Unit(s) IV Push every 6 hours PRN For aPTT less than 40  heparin  Injectable 3500 Unit(s) IV Push every 6 hours PRN For aPTT between 40 - 57  nicotine  Polacrilex Gum 2 milliGRAM(s) Oral every 2 hours PRN breakthrough cravings  oxyCODONE    5 mG/acetaminophen 325 mG 1 Tablet(s) Oral every 6 hours PRN Severe Pain (7 - 10)  traMADol 50 milliGRAM(s) Oral every 6 hours PRN mild and moderate pain      Allergies    No Known Allergies    Intolerances        VITALS:    Vital Signs Last 24 Hrs  T(C): 36.7 (02 Apr 2018 04:40), Max: 36.8 (01 Apr 2018 14:54)  T(F): 98.1 (02 Apr 2018 04:40), Max: 98.3 (01 Apr 2018 14:54)  HR: 123 (02 Apr 2018 04:40) (121 - 123)  BP: 114/88 (02 Apr 2018 06:51) (103/84 - 114/88)  BP(mean): --  RR: 19 (02 Apr 2018 04:40) (16 - 19)  SpO2: 100% (02 Apr 2018 04:40) (96% - 100%)    LABS:                          11.0   5.35  )-----------( 188      ( 02 Apr 2018 05:15 )             38.4       04-02    138  |  96<L>  |  46<H>  ----------------------------<  102<H>  4.9   |  30  |  1.97<H>    Ca    8.9      02 Apr 2018 05:15  Phos  3.4     04-01  Mg     1.9     04-01    TPro  6.7  /  Alb  3.3  /  TBili  1.2  /  DBili  x   /  AST  72<H>  /  ALT  71<H>  /  AlkPhos  107  04-01      CAPILLARY BLOOD GLUCOSE          PT/INR - ( 01 Apr 2018 09:01 )   PT: 15.8 SEC;   INR: 1.37          PTT - ( 02 Apr 2018 05:15 )  PTT:95.9 SEC    LOWER EXTREMITY PHYSICAL EXAM:    Vascular: DP/PT 0/4, B/L, CFT <5 seconds B/L difficult to accurately assess due to dark skin, Temperature gradient warm B/L, L warmer than R. B/l +2 pitting edema to leg and foot. No change in pain with elevation or dependency  Neuro: Epicritic sensation intact to b/l feet.  Musculoskeletal/Ortho: No gross deformities to b/l foot. localized pain beginning at heads of metatarsals 1-3 of LF, pain with palpation  Skin: No open lesions or abrasions, no loss in skin lines, or blanching, no SOI at this time, mild edema to forefoot LF and pitting edema +1 right foot    RADIOLOGY & ADDITIONAL STUDIES:  < from: Xray Foot AP + Lateral + Oblique, Left (04.01.18 @ 10:29) >    EXAM:  RAD FOOT MIN 3 VIEWS LT        PROCEDURE DATE:  Apr 1 2018         INTERPRETATION:  CLINICAL INFORMATION: Status post fall, pain in first   three digits of left foot.    EXAM: 3 views of the left  foot with prior foot radiograph 3/11/2018.    IMPRESSION:  No acute fracture or dislocation. Chronic healed fracture of the left   distal fibula with mild residual misalignment.    No ankle joint effusion.    Mild soft tissue swelling over the plantar and dorsal aspect of the foot.                  MARGARITA MCKINNON M.D., RADIOLOGY RESIDENT  This document has been electronically signed.  JOSÉ ANTONIO FAUST M.D.; ATTENDING RADIOLOGIST  This document has been electronically signed. Apr 1 2018 10:44AM                  < end of copied text >  < from: [facility] Duplex Physiol Ext Low 3+ Level, BI. (03.12.18 @ 13:47) >    Patient name: RHETT HOPSON  Date of test: 3/12/2018  MR#: [id number]  [facility] #: [id number]    Location: [facility] Physician(s): , Not Available Doctor, MD  Interpreted by: Dmitri Mccartney MD, [facility], [facility], [facility]  Tech: Jigar Oquendo RVT  Type of Test: LE Arterial: MERCED/Segm.  ------------------------------------------------------------------------  Procedure: Segmental Pressure/Waveform - complete  noninvasive physiologic studies of the bilateral lower  extremity arteries.  Indications: Atheroembolism of bilateral lower extremities  (I75.023)  ------------------------------------------------------------------------  PRESSURE (mm Hg):  ------------------------------------------------------------------------  RIGHT (BP):  Brachial: 113  High Thigh:  Low Thigh: 107  Calf: 150  Ankle-PT: 99  Ankle-DP:  Greate Toe:  MERCED: 0.88  ------------------------------------------------------------------------  LEFT (BP):  Brachial: 105  High Thigh:  Low Thigh: 94  Calf: 203  Ankle-PT: 105  Ankle-DP: 102  Greate Toe: 44  MERCED: 0.93  ------------------------------------------------------------------------  WAVEFORM:  ------------------------------------------------------------------------  RIGHT:  CFA:  Popliteal:  Ankle-PT:  Ankle-DP:  ------------------------------------------------------------------------  LEFT:  CFA:  Popliteal:  Ankle-PT:  Ankle-DP:  ------------------------------------------------------------------------  PSA/AVF:  ------------------------------------------------------------------------  RIGHT:  Pseudoaneurysm:  A-V fistula:  TBI:  ------------------------------------------------------------------------  LEFT:  Pseudoaneurysm:  A-V fistula:  TBI: 0.39  ------------------------------------------------------------------------  Right Findings: Poor waveform baseline due to constant  patient movement may affect data interpretation.  The ankle-brachial index (MERCED) on the right is mildly  decreased (0.88).  Pulse volume recording (PVR) waveforms appear diminished  throughout the right lower extremity.  The right toe  waveforms appear flat/absent.  These findings suggest the  presence of multi-level arterial disease, including  proximal inflow arterial disease.  Left Findings: Poor waveform baseline due to constant  patient movement may affect data interpretation.  The ankle-brachial index (MERCED) on the left is borderline  (0.93).  The toe-brachial index (TBI) on the left is moderately  reduced (0.39).  The left toe pressure is 44 mmHg.  Pulse volume recording (PVR) waveforms appear diminished  throughout the left lower extremity. These findings  suggest the presence of multi-level arterial disease,  including proximal inflow arterial disease.  ------------------------------------------------------------------------  Summary/Impressions:  Technically difficult study.  Poor waveform baseline due  to constant patient movement may affect data  interpretation.  1.  Although right MERCED is mildly decreased (0.88),  waveform analysis suggests the presence of multi-level  arterial disease, including proximal inflow arterial  disease.  This may be artifactual due to poor waveform  baseline.  2.  Left MERCED is borderline (0.93). Left TBI is moderately  reduced (0.39).  Waveform and segmental pressure analyses  suggest the presence of multi-level arterial disease,  including proximal inflow arterial disease.  This may be  artifactual due to poor waveform baseline.  ------------------------------------------------------------------------  Confirmed on  3/13/2018 - 2:59PM by Dmitri Mccartney MD, [facility],  [facility], RPVI  By signing this report, the attending physician certifies  that he or she has personally supervised and interpreted  the vascular study and has reviewed and or edited and  agrees with the written comments contained within the  report.    < end of copied text >

## 2018-04-02 NOTE — PROGRESS NOTE ADULT - PROBLEM SELECTOR PLAN 1
tele monitor: a.fib on admission. now sinus tachy on monitor.   cardiac enzymes x 2 neg  Serial EKGs  c/w Heparin drip.  Monitor APTT  Maintain falls and bleeding precautions.  TTE pending.   Metoprolol 25mg po BID, rate not well controlled. Will increase to 50 mg BID.  cardiology eval. Dr. Castañeda.  Pain may be contributing factor for tachycardia.

## 2018-04-02 NOTE — PROGRESS NOTE ADULT - SUBJECTIVE AND OBJECTIVE BOX
Patient is a 72y old  Male who presents with a chief complaint of left foot pain and new rapid afib (01 Apr 2018 14:04)      SUBJECTIVE / OVERNIGHT EVENTS:  foot/toe pain is much better control.  no cp, no sob, no n/v/d. no abdominal pain.  no headache, no dizziness.   Cr trending up.       Vital Signs Last 24 Hrs  T(C): 36.7 (02 Apr 2018 04:40), Max: 36.8 (01 Apr 2018 14:54)  T(F): 98.1 (02 Apr 2018 04:40), Max: 98.3 (01 Apr 2018 14:54)  HR: 123 (02 Apr 2018 04:40) (121 - 123)  BP: 114/88 (02 Apr 2018 06:51) (103/84 - 114/88)  BP(mean): --  RR: 19 (02 Apr 2018 04:40) (16 - 19)  SpO2: 100% (02 Apr 2018 04:40) (96% - 100%)  I&O's Summary    01 Apr 2018 07:01  -  02 Apr 2018 07:00  --------------------------------------------------------  IN: 0 mL / OUT: 225 mL / NET: -225 mL        PHYSICAL EXAM:  GENERAL: NAD, Comfortable, elderly with stutter and slow speech  HEAD:  Atraumatic, Normocephalic  EYES: EOMI, PERRLA, conjunctiva and sclera clear  NECK: Supple, No JVD  CHEST/LUNG: Clear to auscultation bilaterally; No wheeze  HEART: Regular rate and rhythm; No murmurs, rubs, or gallops  ABDOMEN: Soft, Nontender, Nondistended; Bowel sounds present  Neuro: AAO x 3, no focal deficit, 5/5 b/l extremities  EXTREMITIES:  2+ Peripheral Pulses, No clubbing, cyanosis. +edema b/l foot. left toe 1st and 2nd toe pain, improved today. no erythema.   SKIN: No rashes or lesions    LABS:                        11.0   5.35  )-----------( 188      ( 02 Apr 2018 05:15 )             38.4     04-02    138  |  96<L>  |  46<H>  ----------------------------<  102<H>  4.9   |  30  |  1.97<H>    Ca    8.9      02 Apr 2018 05:15  Phos  3.4     04-01  Mg     1.9     04-01    TPro  6.7  /  Alb  3.3  /  TBili  1.2  /  DBili  x   /  AST  72<H>  /  ALT  71<H>  /  AlkPhos  107  04-01    PT/INR - ( 01 Apr 2018 09:01 )   PT: 15.8 SEC;   INR: 1.37          PTT - ( 02 Apr 2018 05:15 )  PTT:95.9 SEC  CAPILLARY BLOOD GLUCOSE        CARDIAC MARKERS ( 01 Apr 2018 14:46 )  x     / 0.07 ng/mL / 136 u/L / 5.10 ng/mL / x      CARDIAC MARKERS ( 01 Apr 2018 09:32 )  x     / 0.07 ng/mL / 162 u/L / 4.80 ng/mL / x              RADIOLOGY & ADDITIONAL TESTS:    Imaging Personally Reviewed:  [x] YES  [ ] NO    Consultant(s) Notes Reviewed:  [x] YES  [ ] NO      MEDICATIONS  (STANDING):  aspirin enteric coated 81 milliGRAM(s) Oral daily  atorvastatin 10 milliGRAM(s) Oral at bedtime  heparin  Infusion.  Unit(s)/Hr (16 mL/Hr) IV Continuous <Continuous>  latanoprost 0.005% Ophthalmic Solution 1 Drop(s) Both EYES at bedtime  metoprolol tartrate 25 milliGRAM(s) Oral two times a day  nicotine -   7 mG/24Hr(s) Patch 1 Patch Transdermal daily  pantoprazole    Tablet 40 milliGRAM(s) Oral before breakfast  predniSONE   Tablet 40 milliGRAM(s) Oral daily  sodium chloride 0.9%. 1000 milliLiter(s) (75 mL/Hr) IV Continuous <Continuous>    MEDICATIONS  (PRN):  heparin  Injectable 7000 Unit(s) IV Push every 6 hours PRN For aPTT less than 40  heparin  Injectable 3500 Unit(s) IV Push every 6 hours PRN For aPTT between 40 - 57  nicotine  Polacrilex Gum 2 milliGRAM(s) Oral every 2 hours PRN breakthrough cravings  oxyCODONE    5 mG/acetaminophen 325 mG 1 Tablet(s) Oral every 6 hours PRN Severe Pain (7 - 10)  traMADol 50 milliGRAM(s) Oral every 6 hours PRN mild and moderate pain      Care Discussed with Consultants/Other Providers [x] YES  [ ] NO    HEALTH ISSUES - PROBLEM Dx:  Need for prophylactic measure: Need for prophylactic measure  HTN (Hypertension), Benign: HTN (Hypertension), Benign  Glaucoma: Glaucoma  Dyslipidemia: Dyslipidemia  Gout attack: Gout attack  Atrial fibrillation with RVR: Atrial fibrillation with RVR

## 2018-04-02 NOTE — CONSULT NOTE ADULT - SUBJECTIVE AND OBJECTIVE BOX
Dr. Vazquez (Nephrology)  Office [phone]  Cell [phone]  [facility]INE[facility]  Cell [phone]    HPI:  73 yo male PMHx of HTN, Gout, HLD, CAD with stent "years ago," PAD with RLE stent, p/w L 1st and 2nd toe pain, s/p unwitnessed fall 2 days ago. Toe pain was 10/10, sharp, throbbing pain since Thursday (4 days) but worse today, so he drove himself to [facility] for treatment and evaluation. Pt admits to unwitnessed fall 2 days, where he woke up on the opposite side of the bed. Upon fixing himself back in the bed he fell down on the floor, on blanket. He denies fever, chills, incontinence, chest pain, sob, or palpitations.  Patient admits to ROMEO <2 block, and chronic LE edema.    In ED pt found to be rapid afib @ 150s bpm. He received IV Dilaudid and PO colchicine for pain with some relief.      Allergies:  No Known Allergies      PAST MEDICAL & SURGICAL HISTORY:  Gout  PVD (peripheral vascular disease)  CAD (coronary artery disease): stented coronary artery 2001  Pneumonia: childhood, no reoccurance  Atherosclerosis of arteries: right leg.  Prostate Ca  Dyslipidemia  HTN (Hypertension), Benign  Glaucoma  Stented coronary artery: 2001  Peripheral vascular disease: s/p right femoral popliteal bypass about 2 years ago  S/P lumbar laminectomy: 2010, L4 L5  S/P prostatectomy: for prostate cancer, several years ago, no chemo/radiation therapy      Home Medications Reviewed    Hospital Medications:   MEDICATIONS  (STANDING):  aspirin enteric coated 81 milliGRAM(s) Oral daily  atorvastatin 10 milliGRAM(s) Oral at bedtime  heparin  Infusion.  Unit(s)/Hr (16 mL/Hr) IV Continuous <Continuous>  latanoprost 0.005% Ophthalmic Solution 1 Drop(s) Both EYES at bedtime  metoprolol tartrate 25 milliGRAM(s) Oral two times a day  nicotine -   7 mG/24Hr(s) Patch 1 Patch Transdermal daily  pantoprazole    Tablet 40 milliGRAM(s) Oral before breakfast  predniSONE   Tablet 40 milliGRAM(s) Oral daily  sodium chloride 0.9%. 1000 milliLiter(s) (75 mL/Hr) IV Continuous <Continuous>      SOCIAL HISTORY:  Denies ETOh, Smoking,     FAMILY HISTORY:  Family history of diabetes mellitus (DM) (Child)      REVIEW OF SYSTEMS:  CONSTITUTIONAL: No weakness, fevers or chills  EYES/ENT: No visual changes;  No vertigo or throat pain   NECK: No pain or stiffness  RESPIRATORY: ROMEO  CARDIOVASCULAR: No chest pain or palpitations.  GASTROINTESTINAL: No abdominal or epigastric pain. No nausea, vomiting, or hematemesis; No diarrhea or constipation. No melena or hematochezia.  GENITOURINARY: No dysuria, frequency, foamy urine, urinary urgency, incontinence or hematuria  NEUROLOGICAL: No numbness or weakness  SKIN: No itching, burning, rashes, or lesions   VASCULAR: No bilateral lower extremity edema.   All other review of systems is negative unless indicated above.    VITALS:  T(F): 98.1 (04-02-18 @ 04:40), Max: 98.3 (04-01-18 @ 14:54)  HR: 123 (04-02-18 @ 04:40)  BP: 114/88 (04-02-18 @ 06:51)  RR: 19 (04-02-18 @ 04:40)  SpO2: 100% (04-02-18 @ 04:40)  Wt(kg): --    04-01 @ 07:01  -  04-02 @ 07:00  --------------------------------------------------------  IN: 0 mL / OUT: 225 mL / NET: -225 mL      Height (cm): 177.8 (04-01 @ 14:04)  Weight (kg): 85.7 (04-01 @ 14:04)  BMI (kg/m2): 27.1 (04-01 @ 14:04)  BSA (m2): 2.04 (04-01 @ 14:04)    PHYSICAL EXAM:  Constitutional: NAD  HEENT: anicteric sclera, oropharynx clear, MMM  Neck: No JVD  Respiratory: CTAB, no wheezes, rales or rhonchi  Cardiovascular: S1, S2, RRR  Gastrointestinal: BS+, soft, NT/ND  Extremities: + peripheral edema, left toe pain  Neurological: A/O x 3, no focal deficits  Psychiatric: Normal mood, normal affect  : No CVA tenderness. No pham.   Skin: No rashes      LABS:  04-02    138  |  96<L>  |  46<H>  ----------------------------<  102<H>  4.9   |  30  |  1.97<H>    Ca    8.9      02 Apr 2018 05:15  Phos  3.4     04-01  Mg     1.9     04-01    TPro  6.7  /  Alb  3.3  /  TBili  1.2  /  DBili      /  AST  72<H>  /  ALT  71<H>  /  AlkPhos  107  04-01    Creatinine Trend: 1.97 <--, 1.78 <--, 1.45 <--, 1.50 <--                        11.0   5.35  )-----------( 188      ( 02 Apr 2018 05:15 )             38.4     Urine Studies:        RADIOLOGY & ADDITIONAL STUDIES: Dr. Vazquez (Nephrology)  Office [phone]  Cell [phone]  [facility]INE[facility]  Cell [phone]    HPI:  73 yo male PMHx of HTN, Gout, HLD, CAD with stent "years ago," PAD with RLE stent, p/w L 1st and 2nd toe pain, s/p unwitnessed fall 2 days ago. Toe pain was 10/10, sharp, throbbing pain since Thursday (4 days) but worse today, so he drove himself to [facility] for treatment and evaluation. Pt admits to unwitnessed fall 2 days, where he woke up on the opposite side of the bed. Upon fixing himself back in the bed he fell down on the floor, on blanket. He denies fever, chills, incontinence, chest pain, sob, or palpitations.  Patient admits to ROMEO <2 block, and chronic LE edema.  In ED pt found to be rapid afib @ 150s bpm. He received IV Dilaudid and PO colchicine for pain with some relief.      Allergies:  No Known Allergies      PAST MEDICAL & SURGICAL HISTORY:  Gout  PVD (peripheral vascular disease)  CAD (coronary artery disease): stented coronary artery 2001  Pneumonia: childhood, no reoccurance  Atherosclerosis of arteries: right leg.  Prostate Ca  Dyslipidemia  HTN (Hypertension), Benign  Glaucoma  Stented coronary artery: 2001  Peripheral vascular disease: s/p right femoral popliteal bypass about 2 years ago  S/P lumbar laminectomy: 2010, L4 L5  S/P prostatectomy: for prostate cancer, several years ago, no chemo/radiation therapy      Home Medications Reviewed    Hospital Medications:   MEDICATIONS  (STANDING):  aspirin enteric coated 81 milliGRAM(s) Oral daily  atorvastatin 10 milliGRAM(s) Oral at bedtime  heparin  Infusion.  Unit(s)/Hr (16 mL/Hr) IV Continuous <Continuous>  latanoprost 0.005% Ophthalmic Solution 1 Drop(s) Both EYES at bedtime  metoprolol tartrate 25 milliGRAM(s) Oral two times a day  nicotine -   7 mG/24Hr(s) Patch 1 Patch Transdermal daily  pantoprazole    Tablet 40 milliGRAM(s) Oral before breakfast  predniSONE   Tablet 40 milliGRAM(s) Oral daily  sodium chloride 0.9%. 1000 milliLiter(s) (75 mL/Hr) IV Continuous <Continuous>      SOCIAL HISTORY:  Denies ETOh, Smoking,     FAMILY HISTORY:  Family history of diabetes mellitus (DM) (Child)      REVIEW OF SYSTEMS:  CONSTITUTIONAL: No weakness, fevers or chills  EYES/ENT: No visual changes;  No vertigo or throat pain   NECK: No pain or stiffness  RESPIRATORY: ROMEO  CARDIOVASCULAR: No chest pain or palpitations.  GASTROINTESTINAL: No abdominal or epigastric pain. No nausea, vomiting, or hematemesis; No diarrhea or constipation. No melena or hematochezia.  GENITOURINARY: No dysuria, frequency, foamy urine, urinary urgency, incontinence or hematuria  NEUROLOGICAL: No numbness or weakness  SKIN: No itching, burning, rashes, or lesions   VASCULAR: No bilateral lower extremity edema.   All other review of systems is negative unless indicated above.    VITALS:  T(F): 98.1 (04-02-18 @ 04:40), Max: 98.3 (04-01-18 @ 14:54)  HR: 123 (04-02-18 @ 04:40)  BP: 114/88 (04-02-18 @ 06:51)  RR: 19 (04-02-18 @ 04:40)  SpO2: 100% (04-02-18 @ 04:40)  Wt(kg): --    04-01 @ 07:01  -  04-02 @ 07:00  --------------------------------------------------------  IN: 0 mL / OUT: 225 mL / NET: -225 mL      Height (cm): 177.8 (04-01 @ 14:04)  Weight (kg): 85.7 (04-01 @ 14:04)  BMI (kg/m2): 27.1 (04-01 @ 14:04)  BSA (m2): 2.04 (04-01 @ 14:04)    PHYSICAL EXAM:  Constitutional: NAD  HEENT: anicteric sclera, oropharynx clear, MMM  Neck: No JVD  Respiratory: CTAB, no wheezes, rales or rhonchi  Cardiovascular: S1, S2, RRR  Gastrointestinal: BS+, soft, NT/ND  Extremities: + peripheral edema, left toe pain  Neurological: A/O x 3, no focal deficits  Psychiatric: Normal mood, normal affect  : No CVA tenderness. No pham.   Skin: No rashes      LABS:  04-02    138  |  96<L>  |  46<H>  ----------------------------<  102<H>  4.9   |  30  |  1.97<H>    Ca    8.9      02 Apr 2018 05:15  Phos  3.4     04-01  Mg     1.9     04-01    TPro  6.7  /  Alb  3.3  /  TBili  1.2  /  DBili      /  AST  72<H>  /  ALT  71<H>  /  AlkPhos  107  04-01    Creatinine Trend: 1.97 <--, 1.78 <--, 1.45 <--, 1.50 <--                        11.0   5.35  )-----------( 188      ( 02 Apr 2018 05:15 )             38.4     Urine Studies:        RADIOLOGY & ADDITIONAL STUDIES:

## 2018-04-02 NOTE — CONSULT NOTE ADULT - ASSESSMENT
71 yo male PMHx of HTN, Gout, HLD, CAD with stent "years ago," PAD with RLE stent,  p/w L 1st and 2nd toe pain, s/p unwitnessed fall 2 days ago

## 2018-04-02 NOTE — CONSULT NOTE ADULT - ASSESSMENT
1. acute gout  2. New onset atrial flutter fibrillation  3.  History of CAD and stents in the remote past  4.  Peripheral vascular disease  5. Bilateral edema, previously normal LV function possible diastolic dysfunction  6. Chronic renal insufficiency    Recommendations  2-D echo Doppler  Continue metoprolol, increase dose as blood pressure will tolerate control heart rate  Anticoagulation  Consider NATALY cardioversion

## 2018-04-02 NOTE — CONSULT NOTE ADULT - PROBLEM SELECTOR RECOMMENDATION 4
pod following possible gout had received 1 dose of colchicine   pending uric acid  consider rheum pod following possible gout had received 1 dose of colchicine   pending uric acid level  consider rheum

## 2018-04-02 NOTE — CONSULT NOTE ADULT - ASSESSMENT
73 yo M with pain to left forefoot MPJs 1-3 likely 2/2 gouty flare  - Pt seen and evaluated  - Xrays reviewed, no emergent or acute issues noted  - Rec colchicine daily while patient admitted, f/u outpatient with Dr. Alvarez [phone] upon discharge for continued management of gout, possibly with rheum in future  - MERCED/PVR reviewed prior  - No podiatric intervention planned at this time, no SOI  - Please reconsult podiatry if any new or worsening issues arise  - d/w attending

## 2018-04-02 NOTE — PROGRESS NOTE ADULT - PROBLEM SELECTOR PLAN 2
Cr. baseline around 1-1.2  last year.  Now trending up.  Pre-renal vs. ATN   start gentle IVF  renal eval.  trend Cr  renal US

## 2018-04-03 LAB
ALBUMIN SERPL ELPH-MCNC: 3.3 G/DL — SIGNIFICANT CHANGE UP (ref 3.3–5)
ALP SERPL-CCNC: 115 U/L — SIGNIFICANT CHANGE UP (ref 40–120)
ALT FLD-CCNC: 47 U/L — HIGH (ref 4–41)
APTT BLD: 65.5 SEC — HIGH (ref 27.5–37.4)
AST SERPL-CCNC: 26 U/L — SIGNIFICANT CHANGE UP (ref 4–40)
BILIRUB SERPL-MCNC: 0.5 MG/DL — SIGNIFICANT CHANGE UP (ref 0.2–1.2)
BUN SERPL-MCNC: 66 MG/DL — HIGH (ref 7–23)
BUN SERPL-MCNC: 68 MG/DL — HIGH (ref 7–23)
CALCIUM SERPL-MCNC: 9 MG/DL — SIGNIFICANT CHANGE UP (ref 8.4–10.5)
CALCIUM SERPL-MCNC: 9.2 MG/DL — SIGNIFICANT CHANGE UP (ref 8.4–10.5)
CHLORIDE SERPL-SCNC: 96 MMOL/L — LOW (ref 98–107)
CHLORIDE SERPL-SCNC: 96 MMOL/L — LOW (ref 98–107)
CO2 SERPL-SCNC: 28 MMOL/L — SIGNIFICANT CHANGE UP (ref 22–31)
CO2 SERPL-SCNC: 29 MMOL/L — SIGNIFICANT CHANGE UP (ref 22–31)
CREAT SERPL-MCNC: 2.59 MG/DL — HIGH (ref 0.5–1.3)
CREAT SERPL-MCNC: 2.63 MG/DL — HIGH (ref 0.5–1.3)
GLUCOSE SERPL-MCNC: 113 MG/DL — HIGH (ref 70–99)
GLUCOSE SERPL-MCNC: 115 MG/DL — HIGH (ref 70–99)
HCT VFR BLD CALC: 37.8 % — LOW (ref 39–50)
HGB BLD-MCNC: 10.9 G/DL — LOW (ref 13–17)
INR BLD: 1.28 — HIGH (ref 0.88–1.17)
MAGNESIUM SERPL-MCNC: 2.2 MG/DL — SIGNIFICANT CHANGE UP (ref 1.6–2.6)
MAGNESIUM SERPL-MCNC: 2.2 MG/DL — SIGNIFICANT CHANGE UP (ref 1.6–2.6)
MCHC RBC-ENTMCNC: 22.4 PG — LOW (ref 27–34)
MCHC RBC-ENTMCNC: 28.8 % — LOW (ref 32–36)
MCV RBC AUTO: 77.6 FL — LOW (ref 80–100)
NRBC # FLD: 0.16 — SIGNIFICANT CHANGE UP
NRBC FLD-RTO: 2.4 — SIGNIFICANT CHANGE UP
PHOSPHATE SERPL-MCNC: 5 MG/DL — HIGH (ref 2.5–4.5)
PLATELET # BLD AUTO: 196 K/UL — SIGNIFICANT CHANGE UP (ref 150–400)
PMV BLD: SIGNIFICANT CHANGE UP FL (ref 7–13)
POTASSIUM SERPL-MCNC: 5.3 MMOL/L — SIGNIFICANT CHANGE UP (ref 3.5–5.3)
POTASSIUM SERPL-MCNC: 5.5 MMOL/L — HIGH (ref 3.5–5.3)
POTASSIUM SERPL-SCNC: 5.3 MMOL/L — SIGNIFICANT CHANGE UP (ref 3.5–5.3)
POTASSIUM SERPL-SCNC: 5.5 MMOL/L — HIGH (ref 3.5–5.3)
PROT SERPL-MCNC: 6.7 G/DL — SIGNIFICANT CHANGE UP (ref 6–8.3)
PROTHROM AB SERPL-ACNC: 14.8 SEC — HIGH (ref 9.8–13.1)
PTH-INTACT SERPL-MCNC: 70.45 PG/ML — HIGH (ref 15–65)
RBC # BLD: 4.87 M/UL — SIGNIFICANT CHANGE UP (ref 4.2–5.8)
RBC # FLD: 21.6 % — HIGH (ref 10.3–14.5)
SODIUM SERPL-SCNC: 135 MMOL/L — SIGNIFICANT CHANGE UP (ref 135–145)
SODIUM SERPL-SCNC: 135 MMOL/L — SIGNIFICANT CHANGE UP (ref 135–145)
WBC # BLD: 6.71 K/UL — SIGNIFICANT CHANGE UP (ref 3.8–10.5)
WBC # FLD AUTO: 6.71 K/UL — SIGNIFICANT CHANGE UP (ref 3.8–10.5)

## 2018-04-03 PROCEDURE — 76775 US EXAM ABDO BACK WALL LIM: CPT | Mod: 26

## 2018-04-03 PROCEDURE — 93306 TTE W/DOPPLER COMPLETE: CPT | Mod: 26

## 2018-04-03 PROCEDURE — 99223 1ST HOSP IP/OBS HIGH 75: CPT

## 2018-04-03 RX ORDER — FUROSEMIDE 40 MG
80 TABLET ORAL ONCE
Qty: 0 | Refills: 0 | Status: DISCONTINUED | OUTPATIENT
Start: 2018-04-03 | End: 2018-04-03

## 2018-04-03 RX ORDER — CLOPIDOGREL BISULFATE 75 MG/1
75 TABLET, FILM COATED ORAL DAILY
Qty: 0 | Refills: 0 | Status: DISCONTINUED | OUTPATIENT
Start: 2018-04-03 | End: 2018-04-09

## 2018-04-03 RX ORDER — FUROSEMIDE 40 MG
40 TABLET ORAL ONCE
Qty: 0 | Refills: 0 | Status: COMPLETED | OUTPATIENT
Start: 2018-04-03 | End: 2018-04-03

## 2018-04-03 RX ADMIN — ATORVASTATIN CALCIUM 10 MILLIGRAM(S): 80 TABLET, FILM COATED ORAL at 22:53

## 2018-04-03 RX ADMIN — Medication 1 PATCH: at 11:20

## 2018-04-03 RX ADMIN — PANTOPRAZOLE SODIUM 40 MILLIGRAM(S): 20 TABLET, DELAYED RELEASE ORAL at 05:43

## 2018-04-03 RX ADMIN — Medication 1 PATCH: at 11:34

## 2018-04-03 RX ADMIN — CLOPIDOGREL BISULFATE 75 MILLIGRAM(S): 75 TABLET, FILM COATED ORAL at 11:20

## 2018-04-03 RX ADMIN — Medication 50 MILLIGRAM(S): at 17:55

## 2018-04-03 RX ADMIN — LATANOPROST 1 DROP(S): 0.05 SOLUTION/ DROPS OPHTHALMIC; TOPICAL at 22:53

## 2018-04-03 RX ADMIN — HEPARIN SODIUM 1000 UNIT(S)/HR: 5000 INJECTION INTRAVENOUS; SUBCUTANEOUS at 07:02

## 2018-04-03 RX ADMIN — Medication 50 MILLIGRAM(S): at 05:44

## 2018-04-03 RX ADMIN — Medication 40 MILLIGRAM(S): at 15:43

## 2018-04-03 RX ADMIN — Medication 40 MILLIGRAM(S): at 05:44

## 2018-04-03 NOTE — PROGRESS NOTE ADULT - SUBJECTIVE AND OBJECTIVE BOX
Patient is a 72y old  Male who presents with a chief complaint of left foot pain and new rapid afib (2018 14:04)      SUBJECTIVE / OVERNIGHT EVENTS:  foot pain is much better.  tele with a.fib with RVR rate in 120s.  Cr plateau.  no cp, no sob, no n/v/d. no abdominal pain.  no headache, no dizziness.   able to walk.       Vital Signs Last 24 Hrs  T(C): 36.3 (2018 05:40), Max: 36.6 (2018 15:12)  T(F): 97.4 (2018 05:40), Max: 97.8 (2018 15:12)  HR: 123 (2018 05:40) (120 - 123)  BP: 104/65 (2018 05:40) (93/64 - 114/66)  BP(mean): --  RR: 18 (2018 05:40) (16 - 18)  SpO2: 94% (2018 05:40) (94% - 100%)  I&O's Summary      PHYSICAL EXAM:  GENERAL: NAD, Comfortable, elderly with stutter and slow speech baseline  HEAD:  Atraumatic, Normocephalic  EYES: EOMI, PERRLA, conjunctiva and sclera clear  NECK: Supple, No JVD  CHEST/LUNG: Clear to auscultation bilaterally; No wheeze  HEART: Regular rate and rhythm; No murmurs, rubs, or gallops  ABDOMEN: Soft, Nontender, Nondistended; Bowel sounds present  Neuro: AAO x 3, no focal deficit, 5/5 b/l extremities  EXTREMITIES:  2+ Peripheral Pulses, No clubbing, cyanosis. +edema b/l foot. left toe 1st and 2nd toe pain, improved today. no erythema.   SKIN: No rashes or lesions      LABS:                        10.9   6.71  )-----------( 196      ( 2018 05:46 )             37.8     04-03    135  |  96<L>  |  66<H>  ----------------------------<  113<H>  5.3   |  28  |  2.59<H>    Ca    9.0      2018 07:40  Phos  5.0     04-03  Mg     2.2     04-03    TPro  6.7  /  Alb  3.3  /  TBili  0.5  /  DBili  x   /  AST  26  /  ALT  47<H>  /  AlkPhos  115  04-03    PT/INR - ( 2018 05:46 )   PT: 14.8 SEC;   INR: 1.28          PTT - ( 2018 05:46 )  PTT:65.5 SEC  CAPILLARY BLOOD GLUCOSE        CARDIAC MARKERS ( 2018 14:46 )  x     / 0.07 ng/mL / 136 u/L / 5.10 ng/mL / x          Urinalysis Basic - ( 2018 21:16 )    Color: YELLOW / Appearance: HAZY / S.030 / pH: 5.0  Gluc: NEGATIVE / Ketone: NEGATIVE  / Bili: NEGATIVE / Urobili: NORMAL mg/dL   Blood: NEGATIVE / Protein: 100 mg/dL / Nitrite: NEGATIVE   Leuk Esterase: NEGATIVE / RBC: 2-5 / WBC 10-25   Sq Epi: OCC / Non Sq Epi: x / Bacteria: MANY        RADIOLOGY & ADDITIONAL TESTS:    Imaging Personally Reviewed:  [x] YES  [ ] NO    Consultant(s) Notes Reviewed:  [x] YES  [ ] NO      MEDICATIONS  (STANDING):  aspirin enteric coated 81 milliGRAM(s) Oral daily  atorvastatin 10 milliGRAM(s) Oral at bedtime  heparin  Infusion.  Unit(s)/Hr (16 mL/Hr) IV Continuous <Continuous>  latanoprost 0.005% Ophthalmic Solution 1 Drop(s) Both EYES at bedtime  metoprolol tartrate 50 milliGRAM(s) Oral two times a day  nicotine -   7 mG/24Hr(s) Patch 1 Patch Transdermal daily  pantoprazole    Tablet 40 milliGRAM(s) Oral before breakfast  predniSONE   Tablet 40 milliGRAM(s) Oral daily    MEDICATIONS  (PRN):  heparin  Injectable 7000 Unit(s) IV Push every 6 hours PRN For aPTT less than 40  heparin  Injectable 3500 Unit(s) IV Push every 6 hours PRN For aPTT between 40 - 57  nicotine  Polacrilex Gum 2 milliGRAM(s) Oral every 2 hours PRN breakthrough cravings  oxyCODONE    5 mG/acetaminophen 325 mG 1 Tablet(s) Oral every 6 hours PRN Severe Pain (7 - 10)  traMADol 50 milliGRAM(s) Oral every 6 hours PRN mild and moderate pain      Care Discussed with Consultants/Other Providers [x] YES  [ ] NO    HEALTH ISSUES - PROBLEM Dx:  Edema leg: Edema leg  Toe pain, left: Toe pain, left  MARY GRACE (acute kidney injury): MARY GRACE (acute kidney injury)  Acute renal failure superimposed on stage 1 chronic kidney disease, unspecified acute renal failure type: Acute renal failure superimposed on stage 1 chronic kidney disease, unspecified acute renal failure type  Need for prophylactic measure: Need for prophylactic measure  HTN (Hypertension), Benign: HTN (Hypertension), Benign  Glaucoma: Glaucoma  Dyslipidemia: Dyslipidemia  Gout attack: Gout attack  Atrial fibrillation with RVR: Atrial fibrillation with RVR

## 2018-04-03 NOTE — PROGRESS NOTE ADULT - PROBLEM SELECTOR PLAN 1
tele monitor: a.fib still tachy.  cardiac enzymes x 2 neg  Serial EKGs  c/w Heparin drip coumadin bridge.   Monitor APTT  Maintain falls and bleeding precautions.  repeat TTE pending.   c/w increased rate of Metoprolol 50 mg po BID,  cardiology eval. Dr. Castañeda.  EP consult for possible NATALY with cardioversion.

## 2018-04-03 NOTE — PROGRESS NOTE ADULT - PROBLEM SELECTOR PLAN 2
Cr. baseline around 1-1.2  last year.  Now trending up.  Not responding to IVF, unlikely Pre-renal  Liklely ATN, hemodynamic induced due to rapid heart rate.   Renal eval appreciated.  trend Cr  renal US

## 2018-04-03 NOTE — PROGRESS NOTE ADULT - SUBJECTIVE AND OBJECTIVE BOX
Subjective: Patient seen and examined. No new events except as noted.   patient offers no complaints but does relate that at home when he walks a few blocks he is short of breath. He denies chest pain or palpitations  Renal function continues to worsen with creatinine now greater than 2.5  He remains in a tachycardia probably an atrial tachycardia  MEDICATIONS:  MEDICATIONS  (STANDING):  atorvastatin 10 milliGRAM(s) Oral at bedtime  clopidogrel Tablet 75 milliGRAM(s) Oral daily  furosemide   Injectable 40 milliGRAM(s) IV Push once  heparin  Infusion.  Unit(s)/Hr (16 mL/Hr) IV Continuous <Continuous>  latanoprost 0.005% Ophthalmic Solution 1 Drop(s) Both EYES at bedtime  metoprolol tartrate 50 milliGRAM(s) Oral two times a day  nicotine -   7 mG/24Hr(s) Patch 1 Patch Transdermal daily  pantoprazole    Tablet 40 milliGRAM(s) Oral before breakfast  predniSONE   Tablet 40 milliGRAM(s) Oral daily      PHYSICAL EXAM:  T(C): 36.4 (04-03-18 @ 14:42), Max: 36.6 (04-02-18 @ 15:12)  HR: 121 (04-03-18 @ 14:42) (120 - 123)  BP: 103/74 (04-03-18 @ 14:42) (93/64 - 114/66)  RR: 17 (04-03-18 @ 14:42) (16 - 18)  SpO2: 99% (04-03-18 @ 14:42) (94% - 100%)  Wt(kg): --  I&O's Summary        Appearance: Normal nno acute distress awake alert  HEENT:   Normal oral mucosa, PERRL, EOMI	  Cardiovascular: Normal S1 S2,regular but tachycardic moderate JVD, No murmurs ,P2 increased  Respiratory: decreased breath sounds at bases with some rales	  Gastrointestinal:  Soft, Non-tender, + BS	  Skin: No rashes, No ecchymoses, No cyanosis, warm to touch  Musculoskeletal: Normal range of motion, normal strength  Psychiatry:  Mood & affect appropriate  Ext: 2-3+ edema  Peripheral pulses palpable 2+ bilaterally      LABS:    CARDIAC MARKERS:  CARDIAC MARKERS ( 01 Apr 2018 14:46 )  x     / 0.07 ng/mL / 136 u/L / 5.10 ng/mL / x      CARDIAC MARKERS ( 01 Apr 2018 09:32 )  x     / 0.07 ng/mL / 162 u/L / 4.80 ng/mL / x                                    10.9   6.71  )-----------( 196      ( 03 Apr 2018 05:46 )             37.8     04-03    135  |  96<L>  |  66<H>  ----------------------------<  113<H>  5.3   |  28  |  2.59<H>    Ca    9.0      03 Apr 2018 07:40  Phos  5.0     04-03  Mg     2.2     04-03    TPro  6.7  /  Alb  3.3  /  TBili  0.5  /  DBili  x   /  AST  26  /  ALT  47<H>  /  AlkPhos  115  04-03    TELEMETRY: 	    ECG:  probable atrial tachycardiaheart rate 121	  RADIOLOGY:   echo  < from: Transthoracic Echocardiogram (04.03.18 @ 09:50) >  CONCLUSIONS:  1. Endocardium not well visualized. Grossly mild global  left ventricular systolic dysfunction.Septal consistent  with right ventricular overload.  2. Moderate right atrial enlargement.  3. Right ventricular enlargement with decreased right  ventricular systolic function.  4. Estimated pulmonary artery systolic pressure equals 55  mm Hg, assuming right atrial pressure equals 10  mm Hg,  consistent with moderate pulmonary hypertension.  ------------------------------------------------------------------------  Confirmed on  4/3/2018 - 12:59:39 by Ezio Hickey M.D.

## 2018-04-03 NOTE — PROGRESS NOTE ADULT - PROBLEM SELECTOR PLAN 1
FEna<1% hypervolemic on exam could be prerenal state sec to CHF exacerbation. lasix 40mg iv now. monitor bmp, will likely need to continue diuresing tmr. will continue to follow.  monitor bmp  elevated PTH check vit d 25 FEna<1% hypervolemic on exam likely prerenal state sec to CHF exacerbation. lasix 40mg iv now. monitor bmp, will likely need to continue diuresing tmr. will continue to follow.  monitor bmp  elevated PTH check vit d 25

## 2018-04-03 NOTE — CONSULT NOTE ADULT - SUBJECTIVE AND OBJECTIVE BOX
Patient is a 72y old  Male with past medical history  of gout, coronary artery disease s/p stent, PVD s/p right fem-pop bypass, hypertension, hyperlipidemia and chronic kidney disease who presented with 10/10 left foot pain and increasing dyspnea on exertion. Also stated he had slipped while getting out of bed twice during the last 3 weeks. No chest pain shortness of breath at rest, palpitations or dizziness. No syncope. In the ED he was found to be in an atrial tachycardia 150's with significant lower extremity edema and + JVD. He denies history  of any cardiac arrhythmia.          PAST MEDICAL & SURGICAL HISTORY:  Gout  PVD (peripheral vascular disease)  CAD (coronary artery disease): stented coronary artery   Pneumonia: childhood, no reoccurance  Atherosclerosis of arteries: right leg.  Prostate Ca  Dyslipidemia  HTN (Hypertension), Benign  Glaucoma  Stented coronary artery:   Peripheral vascular disease: s/p right femoral popliteal bypass about 2 years ago  S/P lumbar laminectomy: , L4 L5  S/P prostatectomy: for prostate cancer, several years ago, no chemo/radiation therapy    MEDICATIONS  (STANDING):  atorvastatin 10 milliGRAM(s) Oral at bedtime  clopidogrel Tablet 75 milliGRAM(s) Oral daily  heparin  Infusion.  Unit(s)/Hr (16 mL/Hr) IV Continuous <Continuous>  latanoprost 0.005% Ophthalmic Solution 1 Drop(s) Both EYES at bedtime  metoprolol tartrate 50 milliGRAM(s) Oral two times a day  nicotine -   7 mG/24Hr(s) Patch 1 Patch Transdermal daily  pantoprazole    Tablet 40 milliGRAM(s) Oral before breakfast  predniSONE   Tablet 40 milliGRAM(s) Oral daily    MEDICATIONS  (PRN):  heparin  Injectable 7000 Unit(s) IV Push every 6 hours PRN For aPTT less than 40  heparin  Injectable 3500 Unit(s) IV Push every 6 hours PRN For aPTT between 40 - 57  nicotine  Polacrilex Gum 2 milliGRAM(s) Oral every 2 hours PRN breakthrough cravings  oxyCODONE    5 mG/acetaminophen 325 mG 1 Tablet(s) Oral every 6 hours PRN Severe Pain (7 - 10)  traMADol 50 milliGRAM(s) Oral every 6 hours PRN mild and moderate pain      FAMILY HISTORY:  Family history of diabetes mellitus (DM) (Child)      SOCIAL HISTORY:    CIGARETTES:  smokes 1/4 ppd x 50 years    ALCOHOL: 3 whiskeys daily    REVIEW OF SYSTEMS:    CONSTITUTIONAL: No fever, weight loss, chills, shakes, or fatigue  EYES: No eye pain, visual disturbances, or discharge  ENMT:  No difficulty hearing, tinnitus, vertigo; No sinus or throat pain  NECK: No pain or stiffness  BREASTS: No pain, masses, or nipple discharge  RESPIRATORY: No cough, wheezing, hemoptysis, + shortness of breath after walking 2 blocks or climbing one flight of stairs  CARDIOVASCULAR: No chest pain,  palpitations, dizziness, syncope, paroxysmal nocturnal dyspnea, orthopnea + lower extremity swelling  GASTROINTESTINAL: No abdominal  or epigastric pain, nausea, vomiting, hematemesis, diarrhea, constipation, melena or bright red blood.  GENITOURINARY: No dysuria, nocturia, hematuria, or urinary incontinence  NEUROLOGICAL: No headaches, memory loss, slurred speech, limb weakness, loss of strength, numbness, or tremors  SKIN: No itching, burning, rashes, or lesions   LYMPH NODES: No enlarged glands  ENDOCRINE: No heat or cold intolerance, or hair loss  MUSCULOSKELETAL: left foot 2nd metatarsal 10/10 pain  PSYCHIATRIC: No depression, anxiety, or difficulty sleeping  HEME/LYMPH: No easy bruising or bleeding gums  ALLERgY AND IMMUNOLOGIC: No hives or rash.      Vital Signs Last 24 Hrs  T(C): 36.7 (2018 17:52), Max: 36.7 (2018 17:52)  T(F): 98.1 (2018 17:52), Max: 98.1 (2018 17:52)  HR: 128 (2018 17:52) (120 - 128)  BP: 113/66 (2018 17:52) (103/74 - 132/62)  BP(mean): --  RR: 17 (2018 17:52) (17 - 18)  SpO2: 99% (2018 17:52) (94% - 99%)    PHYSICAL EXAM:    GENERAL: In no apparent distress, well nourished, and hydrated. Cannot lay flat comfortably.  HEAD:  Atraumatic, Normocephalic  EYES: EOMI, PERRLA, conjunctiva and sclera clear  ENMT: No tonsillar erythema, exudates, or enlargement; Moist mucous membranes,   NECK: Supple and normal thyroid. + JVD   No carotid bruit.    HEART: Regular rate and rhythm; No murmurs, rubs, or gallops.  PULMONARY: decreased breath sounds bilateral lower lobes  ABDOMEN: Soft, Nontender, distended; Bowel sounds present  EXTREMITIES:  1+ pedal puses b/l.  3+ pitting edema lower extremities   LYMPH: No lymphadenopathy noted  NEUROLOGICAL: Grossly nonfocal          INTERPRETATION OF TELEMETRY  Atrial tachycardia with ventricular rate 120's. +PVC's.    ECG: Atrial tachycardia ?flutter with ventricular rate 123 b/min. PVC's   QRS 94ms.           LABS:                        10.9   6.71  )-----------( 196      ( 2018 05:46 )             37.8     04-03    135  |  96<L>  |  66<H>  ----------------------------<  113<H>  5.3   |  28  |  2.59<H>    Ca    9.0      2018 07:40  Phos  5.0     04-03  Mg     2.2     04-03    TPro  6.7  /  Alb  3.3  /  TBili  0.5  /  DBili  x   /  AST  26  /  ALT  47<H>  /  AlkPhos  115  04-03        PT/INR - ( 2018 05:46 )   PT: 14.8 SEC;   INR: 1.28          PTT - ( 2018 05:46 )  PTT:65.5 SEC  Urinalysis Basic - ( 2018 21:16 )    Color: YELLOW / Appearance: HAZY / S.030 / pH: 5.0  Gluc: NEGATIVE / Ketone: NEGATIVE  / Bili: NEGATIVE / Urobili: NORMAL mg/dL   Blood: NEGATIVE / Protein: 100 mg/dL / Nitrite: NEGATIVE   Leuk Esterase: NEGATIVE / RBC: 2-5 / WBC 10-25   Sq Epi: OCC / Non Sq Epi: x / Bacteria:         RADIOLOGY & ADDITIONAL STUDIES:  PREVIOUS DIAGNOSTIC TESTING:      ECHO  FINDINGS:  Ejection Fraction (Teicholtz): 48 %  ------------------------------------------------------------------------  OBSERVATIONS:  Mitral Valve: Mitral annular calcification, otherwise  normal mitral valve. Minimal mitral regurgitation.  Aortic Root: Normal aortic root.  Aortic Valve: Calcified trileaflet aortic valve with normal  opening. Mild aortic regurgitation.  Left Atrium: Normal left atrium.  Left Ventricle: Endocardium not well visualized. Grossly  mild global left ventricular systolic dysfunction. Septal  consistent with right ventricular overload. Normal left  ventricular internal dimensions and wall thicknesses.  Right Heart: Moderate right atrial enlargement. Right  ventricular enlargement with decreased right ventricular  systolic function. Normal tricuspid valve. Mild tricuspid  regurgitation. Normal pulmonic valve. Minimal pulmonic  regurgitation.  Pericardium/PleuraNormal pericardium with no pericardial  effusion.  Hemodynamic: Estimated right ventricular systolic pressure  equals 55 mm Hg, assuming right atrial pressure equals 10  mm Hg, consistent with moderate pulmonary hypertension.  ------------------------------------------------------------------------  CONCLUSIONS:  1. Endocardium not well visualized. Grossly mild global  left ventricular systolic dysfunction.Septal consistent  with right ventricular overload. EF 48%  2. Moderate right atrial enlargement.  3. Right ventricular enlargement with decreased right  ventricular systolic function.  4. Estimated pulmonary artery systolic pressure equals 55  mm Hg, assuming right atrial pressure equals 10  mm Hg,  consistent with moderate pulmonary hypertension.  ------------------------------------------------------------------------  Confirmed on  4/3/2018 - 12:59:39 by Ezio Hickey M.D.  ------------------------------------------------------------------------    STRESS  FINDINGS:       NA    CATHETERIZATION  FINDINGS:                 NA

## 2018-04-03 NOTE — PROGRESS NOTE ADULT - SUBJECTIVE AND OBJECTIVE BOX
Dr. Vazquez (Nephrology)  Office [phone]  Cell [phone]  [facility]  Cell [phone]      Patient is a 72y old  Male who presents with a chief complaint of left foot pain and new rapid afib (01 Apr 2018 14:04)      Patient seen and examined at bedside. No chest pain/sob    VITALS:  T(F): 97.4 (04-03-18 @ 05:40), Max: 97.8 (04-02-18 @ 15:12)  HR: 123 (04-03-18 @ 05:40)  BP: 104/65 (04-03-18 @ 05:40)  RR: 18 (04-03-18 @ 05:40)  SpO2: 94% (04-03-18 @ 05:40)  Wt(kg): --        PHYSICAL EXAM:  Constitutional: NAD  Neck: No JVD  Respiratory: decrease BS at base   Cardiovascular: S1, S2, irregular, tachy   Gastrointestinal: BS+, soft, NT/ND  Extremities: 3+ peripheral edema    Hospital Medications:   MEDICATIONS  (STANDING):  atorvastatin 10 milliGRAM(s) Oral at bedtime  clopidogrel Tablet 75 milliGRAM(s) Oral daily  furosemide   Injectable 80 milliGRAM(s) IV Push once  heparin  Infusion.  Unit(s)/Hr (16 mL/Hr) IV Continuous <Continuous>  latanoprost 0.005% Ophthalmic Solution 1 Drop(s) Both EYES at bedtime  metoprolol tartrate 50 milliGRAM(s) Oral two times a day  nicotine -   7 mG/24Hr(s) Patch 1 Patch Transdermal daily  pantoprazole    Tablet 40 milliGRAM(s) Oral before breakfast  predniSONE   Tablet 40 milliGRAM(s) Oral daily      LABS:  04-03    135  |  96<L>  |  66<H>  ----------------------------<  113<H>  5.3   |  28  |  2.59<H>    Ca    9.0      03 Apr 2018 07:40  Phos  5.0     04-03  Mg     2.2     04-03    TPro  6.7  /  Alb  3.3  /  TBili  0.5  /  DBili      /  AST  26  /  ALT  47<H>  /  AlkPhos  115  04-03    Creatinine Trend: 2.59 <--, 2.63 <--, 1.97 <--, 1.78 <--, 1.45 <--, 1.50 <--    Albumin, Serum: 3.3 g/dL (04-03 @ 07:40)  Phosphorus Level, Serum: 5.0 mg/dL (04-03 @ 05:46)    calcium--  intact pth--  parathyroid hormone intact, serum70.45                            10.9   6.71  )-----------( 196      ( 03 Apr 2018 05:46 )             37.8     Urine Studies:  Urinalysis - [04-02-18 @ 21:16]      Color YELLOW / Appearance HAZY / SG 1.030 / pH 5.0      Gluc NEGATIVE / Ketone NEGATIVE  / Bili NEGATIVE / Urobili NORMAL       Blood NEGATIVE / Protein 100 / Leuk Est NEGATIVE / Nitrite NEGATIVE      RBC 2-5 / WBC 10-25 / Hyaline >50 / Gran  / Sq Epi OCC / Non Sq Epi  / Bacteria MANY    Urine Creatinine 322.10      [04-02-18 @ 21:16]  Urine Sodium 32      [04-02-18 @ 21:16]    PTH 70.45 (Ca --)      [04-03-18 @ 05:46]   --  TSH 3.46      [04-01-18 @ 09:32]        RADIOLOGY & ADDITIONAL STUDIES:

## 2018-04-03 NOTE — PROGRESS NOTE ADULT - SUBJECTIVE AND OBJECTIVE BOX
Patient is a 72y old  Male who presents with a chief complaint of left foot pain and new rapid afib (2018 14:04)       INTERVAL HPI/OVERNIGHT EVENTS:  Patient seen and evaluated at bedside.  Pt is resting comfortable in NAD. Denies N/V/F/C.  Pain significantly improved per patient    Allergies    No Known Allergies    Intolerances        Vital Signs Last 24 Hrs  T(C): 36.3 (2018 05:40), Max: 36.6 (2018 15:12)  T(F): 97.4 (2018 05:40), Max: 97.8 (2018 15:12)  HR: 123 (2018 05:40) (120 - 123)  BP: 104/65 (2018 05:40) (93/64 - 114/66)  BP(mean): --  RR: 18 (2018 05:40) (16 - 18)  SpO2: 94% (2018 05:40) (94% - 100%)    LABS:                        10.9   6.71  )-----------( 196      ( 2018 05:46 )             37.8     04-03    135  |  96<L>  |  66<H>  ----------------------------<  113<H>  5.3   |  28  |  2.59<H>    Ca    9.0      2018 07:40  Phos  5.0     04-03  Mg     2.2     04-03    TPro  6.7  /  Alb  3.3  /  TBili  0.5  /  DBili  x   /  AST  26  /  ALT  47<H>  /  AlkPhos  115  04-03    PT/INR - ( 2018 05:46 )   PT: 14.8 SEC;   INR: 1.28          PTT - ( 2018 05:46 )  PTT:65.5 SEC  Urinalysis Basic - ( 2018 21:16 )    Color: YELLOW / Appearance: HAZY / S.030 / pH: 5.0  Gluc: NEGATIVE / Ketone: NEGATIVE  / Bili: NEGATIVE / Urobili: NORMAL mg/dL   Blood: NEGATIVE / Protein: 100 mg/dL / Nitrite: NEGATIVE   Leuk Esterase: NEGATIVE / RBC: 2-5 / WBC 10-25   Sq Epi: OCC / Non Sq Epi: x / Bacteria: MANY      CAPILLARY BLOOD GLUCOSE          Lower Extremity Physical Exam:  Vascular: DP/PT 0/4, B/L, CFT <5 seconds B/L difficult to accurately assess due to dark skin, Temperature gradient warm B/L, L warmer than R. B/l +2 pitting edema to leg and foot. No change in pain with elevation or dependency  Neuro: Epicritic sensation intact to b/l feet.  Musculoskeletal/Ortho: No gross deformities to b/l foot. no pain elicited with palpation to left foot  Skin: No open lesions or abrasions, no loss in skin lines, or blanching, no SOI at this time, mild edema to forefoot LF and pitting edema +1 right foot    RADIOLOGY & ADDITIONAL TESTS:

## 2018-04-03 NOTE — CONSULT NOTE ADULT - ASSESSMENT
This is a 73 yo male with past medical history of CAD s/p stent x1. PVD with right fem-pop bypass, HTN, HLD, CKD, prostate cancer s/p prostatectomy and gout admitted with gouty attack, ADHF and atrial tachycardia with ventricular rate 120's. Echocardiogram showed LVEF 48%. Treatment options for the atrial tachycardia include rhythm control with either AT ablation or NATALY/DCCV both of which have been discussed with the patient.  He would like us to speak with his PMD (Dr. Lluvia Solis) before he decides. In the meantime, continue diuresis, anticoagulation with heparin and beta blocker therapy with titration as blood pressure permits for improved rate control.

## 2018-04-04 DIAGNOSIS — E87.5 HYPERKALEMIA: ICD-10-CM

## 2018-04-04 DIAGNOSIS — R80.9 PROTEINURIA, UNSPECIFIED: ICD-10-CM

## 2018-04-04 LAB
24R-OH-CALCIDIOL SERPL-MCNC: 53.5 NG/ML — SIGNIFICANT CHANGE UP (ref 30–80)
APTT BLD: 67.7 SEC — HIGH (ref 27.5–37.4)
BASOPHILS # BLD AUTO: 0 K/UL — SIGNIFICANT CHANGE UP (ref 0–0.2)
BASOPHILS # BLD AUTO: 0 K/UL — SIGNIFICANT CHANGE UP (ref 0–0.2)
BASOPHILS NFR BLD AUTO: 0 % — SIGNIFICANT CHANGE UP (ref 0–2)
BASOPHILS NFR BLD AUTO: 0 % — SIGNIFICANT CHANGE UP (ref 0–2)
BUN SERPL-MCNC: 76 MG/DL — HIGH (ref 7–23)
CALCIUM SERPL-MCNC: 9.4 MG/DL — SIGNIFICANT CHANGE UP (ref 8.4–10.5)
CHLORIDE SERPL-SCNC: 93 MMOL/L — LOW (ref 98–107)
CO2 SERPL-SCNC: 28 MMOL/L — SIGNIFICANT CHANGE UP (ref 22–31)
CREAT ?TM UR-MCNC: 14.39 MG/DL — SIGNIFICANT CHANGE UP
CREAT SERPL-MCNC: 2.44 MG/DL — HIGH (ref 0.5–1.3)
EOSINOPHIL # BLD AUTO: 0 K/UL — SIGNIFICANT CHANGE UP (ref 0–0.5)
EOSINOPHIL # BLD AUTO: 0 K/UL — SIGNIFICANT CHANGE UP (ref 0–0.5)
EOSINOPHIL NFR BLD AUTO: 0 % — SIGNIFICANT CHANGE UP (ref 0–6)
EOSINOPHIL NFR BLD AUTO: 0 % — SIGNIFICANT CHANGE UP (ref 0–6)
GLUCOSE SERPL-MCNC: 100 MG/DL — HIGH (ref 70–99)
HCT VFR BLD CALC: 38.7 % — LOW (ref 39–50)
HCT VFR BLD CALC: 41.6 % — SIGNIFICANT CHANGE UP (ref 39–50)
HGB BLD-MCNC: 11.4 G/DL — LOW (ref 13–17)
HGB BLD-MCNC: 12.1 G/DL — LOW (ref 13–17)
IMM GRANULOCYTES # BLD AUTO: 0.02 # — SIGNIFICANT CHANGE UP
IMM GRANULOCYTES # BLD AUTO: 0.02 # — SIGNIFICANT CHANGE UP
IMM GRANULOCYTES NFR BLD AUTO: 0.3 % — SIGNIFICANT CHANGE UP (ref 0–1.5)
IMM GRANULOCYTES NFR BLD AUTO: 0.3 % — SIGNIFICANT CHANGE UP (ref 0–1.5)
INR BLD: 1.24 — HIGH (ref 0.88–1.17)
LYMPHOCYTES # BLD AUTO: 0.49 K/UL — LOW (ref 1–3.3)
LYMPHOCYTES # BLD AUTO: 0.94 K/UL — LOW (ref 1–3.3)
LYMPHOCYTES # BLD AUTO: 12.5 % — LOW (ref 13–44)
LYMPHOCYTES # BLD AUTO: 6.8 % — LOW (ref 13–44)
MAGNESIUM SERPL-MCNC: 2.2 MG/DL — SIGNIFICANT CHANGE UP (ref 1.6–2.6)
MCHC RBC-ENTMCNC: 22.2 PG — LOW (ref 27–34)
MCHC RBC-ENTMCNC: 22.6 PG — LOW (ref 27–34)
MCHC RBC-ENTMCNC: 29.1 % — LOW (ref 32–36)
MCHC RBC-ENTMCNC: 29.5 % — LOW (ref 32–36)
MCV RBC AUTO: 76.5 FL — LOW (ref 80–100)
MCV RBC AUTO: 76.8 FL — LOW (ref 80–100)
MONOCYTES # BLD AUTO: 0.39 K/UL — SIGNIFICANT CHANGE UP (ref 0–0.9)
MONOCYTES # BLD AUTO: 0.54 K/UL — SIGNIFICANT CHANGE UP (ref 0–0.9)
MONOCYTES NFR BLD AUTO: 5.4 % — SIGNIFICANT CHANGE UP (ref 2–14)
MONOCYTES NFR BLD AUTO: 7.2 % — SIGNIFICANT CHANGE UP (ref 2–14)
NEUTROPHILS # BLD AUTO: 6.03 K/UL — SIGNIFICANT CHANGE UP (ref 1.8–7.4)
NEUTROPHILS # BLD AUTO: 6.35 K/UL — SIGNIFICANT CHANGE UP (ref 1.8–7.4)
NEUTROPHILS NFR BLD AUTO: 80 % — HIGH (ref 43–77)
NEUTROPHILS NFR BLD AUTO: 87.5 % — HIGH (ref 43–77)
NRBC # FLD: 0.05 — SIGNIFICANT CHANGE UP
NRBC # FLD: 0.09 — SIGNIFICANT CHANGE UP
NRBC FLD-RTO: 1.2 — SIGNIFICANT CHANGE UP
PLATELET # BLD AUTO: 210 K/UL — SIGNIFICANT CHANGE UP (ref 150–400)
PLATELET # BLD AUTO: 220 K/UL — SIGNIFICANT CHANGE UP (ref 150–400)
PMV BLD: SIGNIFICANT CHANGE UP FL (ref 7–13)
PMV BLD: SIGNIFICANT CHANGE UP FL (ref 7–13)
POTASSIUM SERPL-MCNC: 5.7 MMOL/L — HIGH (ref 3.5–5.3)
POTASSIUM SERPL-SCNC: 5.7 MMOL/L — HIGH (ref 3.5–5.3)
PROT UR-MCNC: < 6 MG/DL — SIGNIFICANT CHANGE UP
PROTHROM AB SERPL-ACNC: 13.8 SEC — HIGH (ref 9.8–13.1)
RBC # BLD: 5.04 M/UL — SIGNIFICANT CHANGE UP (ref 4.2–5.8)
RBC # BLD: 5.44 M/UL — SIGNIFICANT CHANGE UP (ref 4.2–5.8)
RBC # FLD: 21.4 % — HIGH (ref 10.3–14.5)
RBC # FLD: 21.8 % — HIGH (ref 10.3–14.5)
SODIUM SERPL-SCNC: 136 MMOL/L — SIGNIFICANT CHANGE UP (ref 135–145)
WBC # BLD: 7.25 K/UL — SIGNIFICANT CHANGE UP (ref 3.8–10.5)
WBC # BLD: 7.53 K/UL — SIGNIFICANT CHANGE UP (ref 3.8–10.5)
WBC # FLD AUTO: 7.25 K/UL — SIGNIFICANT CHANGE UP (ref 3.8–10.5)
WBC # FLD AUTO: 7.53 K/UL — SIGNIFICANT CHANGE UP (ref 3.8–10.5)

## 2018-04-04 RX ORDER — PANTOPRAZOLE SODIUM 20 MG/1
40 TABLET, DELAYED RELEASE ORAL
Qty: 0 | Refills: 0 | Status: DISCONTINUED | OUTPATIENT
Start: 2018-04-04 | End: 2018-04-05

## 2018-04-04 RX ORDER — SODIUM POLYSTYRENE SULFONATE 4.1 MEQ/G
15 POWDER, FOR SUSPENSION ORAL ONCE
Qty: 0 | Refills: 0 | Status: DISCONTINUED | OUTPATIENT
Start: 2018-04-04 | End: 2018-04-04

## 2018-04-04 RX ORDER — FUROSEMIDE 40 MG
80 TABLET ORAL DAILY
Qty: 0 | Refills: 0 | Status: DISCONTINUED | OUTPATIENT
Start: 2018-04-04 | End: 2018-04-08

## 2018-04-04 RX ORDER — METOPROLOL TARTRATE 50 MG
25 TABLET ORAL ONCE
Qty: 0 | Refills: 0 | Status: COMPLETED | OUTPATIENT
Start: 2018-04-04 | End: 2018-04-04

## 2018-04-04 RX ORDER — SODIUM POLYSTYRENE SULFONATE 4.1 MEQ/G
30 POWDER, FOR SUSPENSION ORAL ONCE
Qty: 0 | Refills: 0 | Status: COMPLETED | OUTPATIENT
Start: 2018-04-04 | End: 2018-04-04

## 2018-04-04 RX ORDER — METOPROLOL TARTRATE 50 MG
5 TABLET ORAL ONCE
Qty: 0 | Refills: 0 | Status: COMPLETED | OUTPATIENT
Start: 2018-04-04 | End: 2018-04-04

## 2018-04-04 RX ADMIN — HEPARIN SODIUM 1000 UNIT(S)/HR: 5000 INJECTION INTRAVENOUS; SUBCUTANEOUS at 07:59

## 2018-04-04 RX ADMIN — LATANOPROST 1 DROP(S): 0.05 SOLUTION/ DROPS OPHTHALMIC; TOPICAL at 22:47

## 2018-04-04 RX ADMIN — Medication 1 PATCH: at 12:14

## 2018-04-04 RX ADMIN — Medication 1 PATCH: at 12:16

## 2018-04-04 RX ADMIN — Medication 25 MILLIGRAM(S): at 23:17

## 2018-04-04 RX ADMIN — ATORVASTATIN CALCIUM 10 MILLIGRAM(S): 80 TABLET, FILM COATED ORAL at 22:47

## 2018-04-04 RX ADMIN — Medication 40 MILLIGRAM(S): at 05:24

## 2018-04-04 RX ADMIN — Medication 5 MILLIGRAM(S): at 17:17

## 2018-04-04 RX ADMIN — Medication 50 MILLIGRAM(S): at 05:24

## 2018-04-04 RX ADMIN — Medication 50 MILLIGRAM(S): at 17:18

## 2018-04-04 RX ADMIN — SODIUM POLYSTYRENE SULFONATE 30 GRAM(S): 4.1 POWDER, FOR SUSPENSION ORAL at 12:16

## 2018-04-04 RX ADMIN — PANTOPRAZOLE SODIUM 40 MILLIGRAM(S): 20 TABLET, DELAYED RELEASE ORAL at 05:24

## 2018-04-04 RX ADMIN — CLOPIDOGREL BISULFATE 75 MILLIGRAM(S): 75 TABLET, FILM COATED ORAL at 12:16

## 2018-04-04 RX ADMIN — Medication 80 MILLIGRAM(S): at 12:16

## 2018-04-04 NOTE — PROGRESS NOTE ADULT - PROBLEM SELECTOR PLAN 1
FEna<1% hypervolemic on exam likely prerenal state sec to CHF exacerbation. renal function better today s/p lasix 40mg iv. still very fluid overloaded. start lasix 80mg iv daily monitor bmp  elevated PTH check vit d 25 FENa<1% hypervolemic on exam likely prerenal state sec to CHF exacerbation. renal function better today s/p lasix 40mg iv one dose yesterday. still very fluid overloaded. start lasix 80mg iv daily. Heart rate needs to be better controlled to improve renal perfusion. monitor bmp  elevated PTH check vit d 25

## 2018-04-04 NOTE — PROGRESS NOTE ADULT - SUBJECTIVE AND OBJECTIVE BOX
Mulling over TEEV/ablation options presetned by EP    Vital Signs Last 24 Hrs  T(C): 36.6 (2018 09:00), Max: 36.7 (2018 17:52)  T(F): 97.8 (2018 09:00), Max: 98.1 (2018 17:52)  HR: 128 (2018 09:00) (121 - 128)  BP: 112/85 (2018 09:00) (101/72 - 132/62)  BP(mean): --  RR: 17 (2018 09:00) (17 - 18)  SpO2: 98% (2018 09:00) (93% - 99%)    GENERAL: NAD, Comfortable, elderly with stutter and slow speech baseline  HEAD:  Atraumatic, Normocephalic  EYES: EOMI, PERRLA, conjunctiva and sclera clear  NECK: Supple, No JVD  CHEST/LUNG: Clear to auscultation bilaterally; No wheeze  HEART: Regular rate and rhythm; No murmurs, rubs, or gallops  ABDOMEN: Soft, Nontender, Nondistended; Bowel sounds present  Neuro: AAO x 3, no focal deficit, 5/5 b/l extremities  EXTREMITIES:  2+ Peripheral Pulses, No clubbing, cyanosis. +edema b/l foot. left toe 1st and 2nd toe pain, improved today. no erythema.   SKIN: No rashes or lesions    LABS:                        12.1   7.53  )-----------( 210      ( 2018 07:08 )             41.6     04-04    136  |  93<L>  |  76<H>  ----------------------------<  100<H>  5.7<H>   |  28  |  2.44<H>    Ca    9.4      2018 07:08  Phos  5.0     04-03  Mg     2.2     -04    TPro  6.7  /  Alb  3.3  /  TBili  0.5  /  DBili  x   /  AST  26  /  ALT  47<H>  /  AlkPhos  115  04-03    PT/INR - ( 2018 07:08 )   PT: 13.8 SEC;   INR: 1.24          PTT - ( 2018 07:08 )  PTT:67.7 SEC  CAPILLARY BLOOD GLUCOSE            Urinalysis Basic - ( 2018 21:16 )    Color: YELLOW / Appearance: HAZY / S.030 / pH: 5.0  Gluc: NEGATIVE / Ketone: NEGATIVE  / Bili: NEGATIVE / Urobili: NORMAL mg/dL   Blood: NEGATIVE / Protein: 100 mg/dL / Nitrite: NEGATIVE   Leuk Esterase: NEGATIVE / RBC: 2-5 / WBC 10-25   Sq Epi: OCC / Non Sq Epi: x / Bacteria: MANY Mulling over TEEV/ablation options presetned by EP    Vital Signs Last 24 Hrs  T(C): 36.6 (2018 09:00), Max: 36.7 (2018 17:52)  T(F): 97.8 (2018 09:00), Max: 98.1 (2018 17:52)  HR: 128 (2018 09:00) (121 - 128)  BP: 112/85 (2018 09:00) (101/72 - 132/62)  BP(mean): --  RR: 17 (2018 09:00) (17 - 18)  SpO2: 98% (2018 09:00) (93% - 99%)    GENERAL: NAD, Comfortable, elderly with stutter and slow speech baseline  HEAD:  Atraumatic, Normocephalic  EYES: EOMI, PERRLA, conjunctiva and sclera clear  NECK: Supple, No JVD  CHEST/LUNG: Clear to auscultation bilaterally; No wheeze  HEART: Regular rate and rhythm; No murmurs, rubs, or gallops  ABDOMEN: Soft, Nontender, Nondistended; Bowel sounds present  Neuro: AAO x 3, no focal deficit, 5/5 b/l extremities  EXTREMITIES:  2+ Peripheral Pulses, +edema b/l foot. left toe 1st and 2nd toe pain, improved today. no erythema.   SKIN: No rashes or lesions    LABS:                        12.1   7.53  )-----------( 210      ( 2018 07:08 )             41.6     04-04    136  |  93<L>  |  76<H>  ----------------------------<  100<H>  5.7<H>   |  28  |  2.44<H>    Ca    9.4      2018 07:08  Phos  5.0     04-03  Mg     2.2     -04    TPro  6.7  /  Alb  3.3  /  TBili  0.5  /  DBili  x   /  AST  26  /  ALT  47<H>  /  AlkPhos  115  04-03    PT/INR - ( 2018 07:08 )   PT: 13.8 SEC;   INR: 1.24          PTT - ( 2018 07:08 )  PTT:67.7 SEC  CAPILLARY BLOOD GLUCOSE            Urinalysis Basic - ( 2018 21:16 )    Color: YELLOW / Appearance: HAZY / S.030 / pH: 5.0  Gluc: NEGATIVE / Ketone: NEGATIVE  / Bili: NEGATIVE / Urobili: NORMAL mg/dL   Blood: NEGATIVE / Protein: 100 mg/dL / Nitrite: NEGATIVE   Leuk Esterase: NEGATIVE / RBC: 2-5 / WBC 10-25   Sq Epi: OCC / Non Sq Epi: x / Bacteria: MANY

## 2018-04-04 NOTE — PROGRESS NOTE ADULT - SUBJECTIVE AND OBJECTIVE BOX
Dr. Vazquez (Nephrology)  Office [phone]  Cell [phone]  [facility]  Cell [phone]      Patient is a 72y old  Male who presents with a chief complaint of left foot pain and new rapid afib (01 Apr 2018 14:04)      Patient seen and examined at bedside. No chest pain/sob    VITALS:  T(F): 97.8 (04-04-18 @ 09:00), Max: 98.1 (04-03-18 @ 17:52)  HR: 128 (04-04-18 @ 09:00)  BP: 112/85 (04-04-18 @ 09:00)  RR: 17 (04-04-18 @ 09:00)  SpO2: 98% (04-04-18 @ 09:00)  Wt(kg): --        PHYSICAL EXAM:  Constitutional: NAD  Neck: + JVD  Respiratory: decrease BS b/l base   Cardiovascular: S1, S2, RRR  Gastrointestinal: BS+, soft, NT/ND  Extremities: 3+ peripheral edema    Hospital Medications:   MEDICATIONS  (STANDING):  atorvastatin 10 milliGRAM(s) Oral at bedtime  clopidogrel Tablet 75 milliGRAM(s) Oral daily  furosemide   Injectable 80 milliGRAM(s) IV Push daily  heparin  Infusion.  Unit(s)/Hr (16 mL/Hr) IV Continuous <Continuous>  latanoprost 0.005% Ophthalmic Solution 1 Drop(s) Both EYES at bedtime  metoprolol tartrate 50 milliGRAM(s) Oral two times a day  nicotine -   7 mG/24Hr(s) Patch 1 Patch Transdermal daily  pantoprazole    Tablet 40 milliGRAM(s) Oral before breakfast  predniSONE   Tablet 40 milliGRAM(s) Oral daily      LABS:  04-04    136  |  93<L>  |  76<H>  ----------------------------<  100<H>  5.7<H>   |  28  |  2.44<H>    Ca    9.4      04 Apr 2018 07:08  Phos  5.0     04-03  Mg     2.2     04-04    TPro  6.7  /  Alb  3.3  /  TBili  0.5  /  DBili      /  AST  26  /  ALT  47<H>  /  AlkPhos  115  04-03    Creatinine Trend: 2.44 <--, 2.59 <--, 2.63 <--, 1.97 <--, 1.78 <--, 1.45 <--, 1.50 <--                                12.1   7.53  )-----------( 210      ( 04 Apr 2018 07:08 )             41.6     Urine Studies:  Urinalysis - [04-02-18 @ 21:16]      Color YELLOW / Appearance HAZY / SG 1.030 / pH 5.0      Gluc NEGATIVE / Ketone NEGATIVE  / Bili NEGATIVE / Urobili NORMAL       Blood NEGATIVE / Protein 100 / Leuk Est NEGATIVE / Nitrite NEGATIVE      RBC 2-5 / WBC 10-25 / Hyaline >50 / Gran  / Sq Epi OCC / Non Sq Epi  / Bacteria MANY    Urine Creatinine 322.10      [04-02-18 @ 21:16]  Urine Sodium 32      [04-02-18 @ 21:16]    PTH 70.45 (Ca --)      [04-03-18 @ 05:46]   --  TSH 3.46      [04-01-18 @ 09:32]        RADIOLOGY & ADDITIONAL STUDIES:

## 2018-04-04 NOTE — PROGRESS NOTE ADULT - SUBJECTIVE AND OBJECTIVE BOX
Patient seen and evaluated today.  No significant events overnight.  Feels generally well; has agreed to RF Ablation.  Telemetry review demonstrates AT with V rates HR: 130 (04-04-18 @ 17:00) (121 - 135). Discussed case with PCP Dr. Elizabeth Solis.    MEDICATIONS:  clopidogrel Tablet 75 milliGRAM(s) Oral daily  furosemide   Injectable 80 milliGRAM(s) IV Push daily  heparin  Infusion  metoprolol tartrate 50 milliGRAM(s) Oral two times a day    atorvastatin 10 milliGRAM(s) Oral at bedtime  clopidogrel Tablet 75 milliGRAM(s) Oral daily  furosemide   Injectable 80 milliGRAM(s) IV Push daily  heparin  Infusion.  Unit(s)/Hr IV Continuous <Continuous>  heparin  Injectable 7000 Unit(s) IV Push every 6 hours PRN  heparin  Injectable 3500 Unit(s) IV Push every 6 hours PRN  latanoprost 0.005% Ophthalmic Solution 1 Drop(s) Both EYES at bedtime  metoprolol tartrate 50 milliGRAM(s) Oral two times a day  nicotine  Polacrilex Gum 2 milliGRAM(s) Oral every 2 hours PRN  nicotine -   7 mG/24Hr(s) Patch 1 Patch Transdermal daily  oxyCODONE    5 mG/acetaminophen 325 mG 1 Tablet(s) Oral every 6 hours PRN  pantoprazole    Tablet 40 milliGRAM(s) Oral before breakfast  predniSONE   Tablet 40 milliGRAM(s) Oral daily  traMADol 50 milliGRAM(s) Oral every 6 hours PRN      REVIEW OF SYSTEMS:  CONSTITUTIONAL: No fever, weight loss, or fatigue  NECK: No pain or stiffness  RESPIRATORY: No cough, wheezing, chills or hemoptysis; No Shortness of Breath  CARDIOVASCULAR: No chest pain, palpitations, passing out, dizziness, +leg swelling and pain  GASTROINTESTINAL: No abdominal or epigastric pain. No nausea, vomiting, or hematemesis; No diarrhea or constipation. No melena or hematochezia.  NEUROLOGICAL: No headaches, memory loss, loss of strength, numbness, or tremors  SKIN: No itching, burning, rashes, or lesions   PSYCHIATRIC: No depression, anxiety, mood swings, or difficulty sleeping  HEME/LYMPH: No easy bruising, or bleeding gums  ALLERGY AND IMMUNOLOGIC: No hives or eczema	  All others negative	    PHYSICAL EXAM:  T(C): 36.8 (04-04-18 @ 17:00), Max: 36.8 (04-04-18 @ 17:00)  HR: 130 (04-04-18 @ 17:00) (121 - 135)  BP: 130/68 (04-04-18 @ 17:00) (101/72 - 130/68)  RR: 17 (04-04-18 @ 17:00) (17 - 18)  SpO2: 98% (04-04-18 @ 17:00) (93% - 99%)  Wt(kg): --  I&O's Summary    Appearance: Normal	  HEENT:   Normal oral mucosa, PERRL, EOMI	  Lymphatic: No lymphadenopathy  Cardiovascular: Normal S1 S2, No JVD, No murmurs, No edema  Respiratory: Lungs clear to auscultation	  Psychiatry: A & O x 3, Mood & affect appropriate  Gastrointestinal:  Soft, Non-tender, + BS	  Skin: No rashes, No ecchymoses, No cyanosis	  Neurologic: Non-focal  Extremities: Normal range of motion, No clubbing, or cyanosis; 2-3+ pitting edema from lower abdomen to lower extremities  Vascular: Peripheral pulses palpable 2+ bilaterally    DIAGNOSTICS:  TELEMETRY: 	      12 Lead ECG:   Ventricular Rate 123 BPM    Atrial Rate 123 BPM    P-R Interval 182 ms    QRS Duration 94 ms    Q-T Interval 288 ms    QTC Calculation(Bezet) 412 ms    R Axis 150 degrees    T Axis -12 degrees    Diagnosis Line Sinus tachycardia with occasional Premature ventricular complexes and Fusion complexes  Possible Lateral infarct , age undetermined  Cannot rule out Inferior infarct , age undetermined  Abnormal ECG (04-01-18 @ 16:19)  12 Lead ECG:   Ventricular Rate 97 BPM    Atrial Rate 97 BPM    P-R Interval 120 ms    QRS Duration 86 ms    Q-T Interval 376 ms    QTC Calculation(Bezet) 477 ms    P Axis 37 degrees    R Axis 174 degrees    T Axis -11 degrees    Diagnosis Line Sinus rhythm with frequent Premature ventricular complexes  Right axis deviation  Low voltage QRS  Cannot rule out Anterior infarct , age undetermined  Abnormal ECG (03-11-18 @ 18:46)  12 Lead ECG:   Ventricular Rate 75 BPM    Atrial Rate 75 BPM    P-R Interval 164 ms    QRS Duration 102 ms     ms    QTc 457 ms    P Axis 36 degrees    R Axis 33 degrees    T Axis 66 degrees    Diagnosis Line Sinus rhythm with occasional Premature ventricular complexes  Otherwise normal ECG (06-28-15 @ 07:35)  12 Lead ECG:   Ventricular Rate 56 BPM    Atrial Rate 56 BPM    P-R Interval 158 ms    QRS Duration 102 ms     ms    QTc 416 ms    P Axis 55 degrees    R Axis 61 degrees    T Axis 72 degrees    Diagnosis Line Sinus bradycardia  Otherwise normal ECG (06-22-15 @ 08:57)  12 Lead ECG:   Ventricular Rate 73 BPM    Atrial Rate 73 BPM    P-R Interval 160 ms    QRS Duration 100 ms     ms    QTc 453 ms    P Axis 25 degrees    R Axis 47 degrees    T Axis 89 degrees    Diagnosis Line SINUS RHYTHM WITH POSSIBLE PREMATURE ATRIAL COMPLEXES WITH ABERRANT CONDUCTION  OTHERWISE NORMAL ECG (03-10-14 @ 11:55)      LABS:	 	                          12.1   7.53  )-----------( 210      ( 04 Apr 2018 07:08 )             41.6     04-04    136  |  93<L>  |  76<H>  ----------------------------<  100<H>  5.7<H>   |  28  |  2.44<H>    Ca    9.4      04 Apr 2018 07:08  Phos  5.0     04-03  Mg     2.2     04-04    TPro  6.7  /  Alb  3.3  /  TBili  0.5  /  DBili  x   /  AST  26  /  ALT  47<H>  /  AlkPhos  115  04-03    proBNP:

## 2018-04-04 NOTE — PROGRESS NOTE ADULT - SUBJECTIVE AND OBJECTIVE BOX
Subjective: Patient seen and examined. No new events except as noted.   the patient continues to be tachycardicwith probably underlying atrial flutter seen by electrophysiology and ablation recommended  The patient wanted input from Dr. Solis  who eventually was contacted  and it appears the patient is now agreeable to ablation  The patient denies shortness of breath or chest painand overall feels well. His major complaint is some weakness of his left leg  MEDICATIONS:  MEDICATIONS  (STANDING):  atorvastatin 10 milliGRAM(s) Oral at bedtime  clopidogrel Tablet 75 milliGRAM(s) Oral daily  diltiazem Injectable 10 milliGRAM(s) IV Push once  furosemide   Injectable 80 milliGRAM(s) IV Push daily  heparin  Infusion.  Unit(s)/Hr (16 mL/Hr) IV Continuous <Continuous>  latanoprost 0.005% Ophthalmic Solution 1 Drop(s) Both EYES at bedtime  metoprolol tartrate 50 milliGRAM(s) Oral two times a day  nicotine -   7 mG/24Hr(s) Patch 1 Patch Transdermal daily  pantoprazole    Tablet 40 milliGRAM(s) Oral before breakfast  predniSONE   Tablet 40 milliGRAM(s) Oral daily      PHYSICAL EXAM:  T(C): 36.8 (04-04-18 @ 17:00), Max: 36.8 (04-04-18 @ 17:00)  HR: 130 (04-04-18 @ 17:00) (121 - 135)  BP: 130/68 (04-04-18 @ 17:00) (101/72 - 130/68)  RR: 17 (04-04-18 @ 17:00) (17 - 18)  SpO2: 98% (04-04-18 @ 17:00) (93% - 99%)  Wt(kg): --  I&O's Summary        Appearance: Normal no acute distress offers no complaints	  HEENT:   Normal oral mucosa, PERRL, EOMI	  Cardiovascular: Normal S1 S2, tachycardic to 120 moderate JVD, No murmurs ,  Respiratory: Lungs clear to auscultation, normal effort 	  Gastrointestinal:  Soft, Non-tender, + BS	  Skin: No rashes, No ecchymoses, No cyanosis, warm to touch  Musculoskeletal: Normal range of motion, normal strength  Psychiatry:  Mood & affect appropriate  Ext: 2+  edema  Peripheral pulses palpable 2+ bilaterally      LABS:    CARDIAC MARKERS:                                12.1   7.53  )-----------( 210      ( 04 Apr 2018 07:08 )             41.6     04-04    136  |  93<L>  |  76<H>  ----------------------------<  100<H>  5.7<H>   |  28  |  2.44<H>    Ca    9.4      04 Apr 2018 07:08  Phos  5.0     04-03  Mg     2.2     04-04    TPro  6.7  /  Alb  3.3  /  TBili  0.5  /  DBili  x   /  AST  26  /  ALT  47<H>  /  AlkPhos  115  04-03    proBNP:   Lipid Profile:   HgA1c:   TSH:           TELEMETRY: 	    ECG:  	  RADIOLOGY:   DIAGNOSTIC TESTING:  [ ] Echocardiogram:  [ ]  Catheterization:  [ ] Stress Test:    OTHER:

## 2018-04-04 NOTE — PROGRESS NOTE ADULT - PROBLEM SELECTOR PLAN 1
tele monitor: a.fib still tachy.  cardiac enzymes x 2 neg  metoprolol 50 mg bid, may titrate up to 75 mg if he sustains above 130  c/w Heparin drip coumadin bridge.   TTE - mild global LV dysfunction with RV overload  cardiology/EP appreciated

## 2018-04-04 NOTE — PROGRESS NOTE ADULT - PROBLEM SELECTOR PLAN 2
prerenal 2' CHF  cont IV lasix  appreciate renal  u/s without hydronephrosis prerenal 2' CHF  cont IV lasix 80 mg  appreciate renal  u/s without hydronephrosis

## 2018-04-05 LAB
ANISOCYTOSIS BLD QL: SIGNIFICANT CHANGE UP
APTT BLD: 144.8 SEC — CRITICAL HIGH (ref 27.5–37.4)
APTT BLD: 27.7 SEC — SIGNIFICANT CHANGE UP (ref 27.5–37.4)
APTT BLD: 30.7 SEC — SIGNIFICANT CHANGE UP (ref 27.5–37.4)
BASOPHILS NFR SPEC: 0 % — SIGNIFICANT CHANGE UP (ref 0–2)
BLD GP AB SCN SERPL QL: NEGATIVE — SIGNIFICANT CHANGE UP
BUN SERPL-MCNC: 81 MG/DL — HIGH (ref 7–23)
CALCIUM SERPL-MCNC: 9.4 MG/DL — SIGNIFICANT CHANGE UP (ref 8.4–10.5)
CHLORIDE SERPL-SCNC: 96 MMOL/L — LOW (ref 98–107)
CO2 SERPL-SCNC: 25 MMOL/L — SIGNIFICANT CHANGE UP (ref 22–31)
CREAT SERPL-MCNC: 2.16 MG/DL — HIGH (ref 0.5–1.3)
EOSINOPHIL NFR FLD: 0 % — SIGNIFICANT CHANGE UP (ref 0–6)
FERRITIN SERPL-MCNC: 33.85 NG/ML — SIGNIFICANT CHANGE UP (ref 30–400)
FOLATE SERPL-MCNC: 6.8 NG/ML — SIGNIFICANT CHANGE UP (ref 4.7–20)
GIANT PLATELETS BLD QL SMEAR: PRESENT — SIGNIFICANT CHANGE UP
GLUCOSE SERPL-MCNC: 116 MG/DL — HIGH (ref 70–99)
HCT VFR BLD CALC: 39 % — SIGNIFICANT CHANGE UP (ref 39–50)
HGB BLD-MCNC: 11.3 G/DL — LOW (ref 13–17)
HYPOCHROMIA BLD QL: SLIGHT — SIGNIFICANT CHANGE UP
INR BLD: 1.28 — HIGH (ref 0.88–1.17)
INR BLD: 1.42 — HIGH (ref 0.88–1.17)
IRON SATN MFR SERPL: 13 UG/DL — LOW (ref 45–165)
IRON SATN MFR SERPL: 354 UG/DL — SIGNIFICANT CHANGE UP (ref 155–535)
LDH SERPL L TO P-CCNC: 199 U/L — SIGNIFICANT CHANGE UP (ref 135–225)
LYMPHOCYTES NFR SPEC AUTO: 3.6 % — LOW (ref 13–44)
MAGNESIUM SERPL-MCNC: 2.1 MG/DL — SIGNIFICANT CHANGE UP (ref 1.6–2.6)
MCHC RBC-ENTMCNC: 22.1 PG — LOW (ref 27–34)
MCHC RBC-ENTMCNC: 29 % — LOW (ref 32–36)
MCV RBC AUTO: 76.3 FL — LOW (ref 80–100)
MICROCYTES BLD QL: SIGNIFICANT CHANGE UP
MONOCYTES NFR BLD: 2.7 % — SIGNIFICANT CHANGE UP (ref 2–9)
NEUTROPHIL AB SER-ACNC: 91 % — HIGH (ref 43–77)
NRBC # FLD: 0.06 — SIGNIFICANT CHANGE UP
PLATELET # BLD AUTO: 223 K/UL — SIGNIFICANT CHANGE UP (ref 150–400)
PLATELET COUNT - ESTIMATE: NORMAL — SIGNIFICANT CHANGE UP
PMV BLD: SIGNIFICANT CHANGE UP FL (ref 7–13)
POIKILOCYTOSIS BLD QL AUTO: SIGNIFICANT CHANGE UP
POLYCHROMASIA BLD QL SMEAR: SLIGHT — SIGNIFICANT CHANGE UP
POTASSIUM SERPL-MCNC: 4.6 MMOL/L — SIGNIFICANT CHANGE UP (ref 3.5–5.3)
POTASSIUM SERPL-SCNC: 4.6 MMOL/L — SIGNIFICANT CHANGE UP (ref 3.5–5.3)
PROTHROM AB SERPL-ACNC: 14.8 SEC — HIGH (ref 9.8–13.1)
PROTHROM AB SERPL-ACNC: 16.5 SEC — HIGH (ref 9.8–13.1)
PTH-INTACT SERPL-MCNC: 177 PG/ML — HIGH (ref 15–65)
RBC # BLD: 5.11 M/UL — SIGNIFICANT CHANGE UP (ref 4.2–5.8)
RBC # FLD: 21.4 % — HIGH (ref 10.3–14.5)
RETICS #: 101 K/UL — SIGNIFICANT CHANGE UP (ref 25–125)
RETICS/RBC NFR: 2.1 % — SIGNIFICANT CHANGE UP (ref 0.5–2.5)
RH IG SCN BLD-IMP: POSITIVE — SIGNIFICANT CHANGE UP
SODIUM SERPL-SCNC: 136 MMOL/L — SIGNIFICANT CHANGE UP (ref 135–145)
SPECIMEN SOURCE: SIGNIFICANT CHANGE UP
TARGETS BLD QL SMEAR: SIGNIFICANT CHANGE UP
UIBC SERPL-MCNC: 341.2 UG/DL — SIGNIFICANT CHANGE UP (ref 110–370)
VARIANT LYMPHS # BLD: 2.7 % — SIGNIFICANT CHANGE UP
VIT B12 SERPL-MCNC: 661 PG/ML — SIGNIFICANT CHANGE UP (ref 200–900)
WBC # BLD: 7.08 K/UL — SIGNIFICANT CHANGE UP (ref 3.8–10.5)
WBC # FLD AUTO: 7.08 K/UL — SIGNIFICANT CHANGE UP (ref 3.8–10.5)

## 2018-04-05 PROCEDURE — 93010 ELECTROCARDIOGRAM REPORT: CPT

## 2018-04-05 PROCEDURE — 99223 1ST HOSP IP/OBS HIGH 75: CPT | Mod: GC

## 2018-04-05 PROCEDURE — 93621 COMP EP EVL L PAC&REC C SINS: CPT | Mod: 26

## 2018-04-05 PROCEDURE — 93613 INTRACARDIAC EPHYS 3D MAPG: CPT

## 2018-04-05 PROCEDURE — 76376 3D RENDER W/INTRP POSTPROCES: CPT | Mod: 26

## 2018-04-05 PROCEDURE — 93653 COMPRE EP EVAL TX SVT: CPT

## 2018-04-05 PROCEDURE — 93312 ECHO TRANSESOPHAGEAL: CPT | Mod: 26

## 2018-04-05 PROCEDURE — 93320 DOPPLER ECHO COMPLETE: CPT | Mod: 26,GC

## 2018-04-05 PROCEDURE — 93325 DOPPLER ECHO COLOR FLOW MAPG: CPT | Mod: 26,GC

## 2018-04-05 RX ORDER — SENNA PLUS 8.6 MG/1
2 TABLET ORAL AT BEDTIME
Qty: 0 | Refills: 0 | Status: DISCONTINUED | OUTPATIENT
Start: 2018-04-05 | End: 2018-04-09

## 2018-04-05 RX ORDER — PANTOPRAZOLE SODIUM 20 MG/1
40 TABLET, DELAYED RELEASE ORAL
Qty: 0 | Refills: 0 | Status: DISCONTINUED | OUTPATIENT
Start: 2018-04-05 | End: 2018-04-09

## 2018-04-05 RX ORDER — DOCUSATE SODIUM 100 MG
100 CAPSULE ORAL THREE TIMES A DAY
Qty: 0 | Refills: 0 | Status: DISCONTINUED | OUTPATIENT
Start: 2018-04-05 | End: 2018-04-09

## 2018-04-05 RX ADMIN — SENNA PLUS 2 TABLET(S): 8.6 TABLET ORAL at 23:04

## 2018-04-05 RX ADMIN — CLOPIDOGREL BISULFATE 75 MILLIGRAM(S): 75 TABLET, FILM COATED ORAL at 12:28

## 2018-04-05 RX ADMIN — Medication 80 MILLIGRAM(S): at 06:05

## 2018-04-05 RX ADMIN — Medication 1 PATCH: at 12:30

## 2018-04-05 RX ADMIN — Medication 1 PATCH: at 12:28

## 2018-04-05 RX ADMIN — Medication 40 MILLIGRAM(S): at 06:06

## 2018-04-05 RX ADMIN — HEPARIN SODIUM 7000 UNIT(S): 5000 INJECTION INTRAVENOUS; SUBCUTANEOUS at 07:52

## 2018-04-05 RX ADMIN — Medication 100 MILLIGRAM(S): at 12:28

## 2018-04-05 RX ADMIN — PANTOPRAZOLE SODIUM 40 MILLIGRAM(S): 20 TABLET, DELAYED RELEASE ORAL at 06:05

## 2018-04-05 RX ADMIN — Medication 50 MILLIGRAM(S): at 06:05

## 2018-04-05 RX ADMIN — Medication 100 MILLIGRAM(S): at 23:04

## 2018-04-05 RX ADMIN — HEPARIN SODIUM 1400 UNIT(S)/HR: 5000 INJECTION INTRAVENOUS; SUBCUTANEOUS at 07:51

## 2018-04-05 NOTE — PROGRESS NOTE ADULT - SUBJECTIVE AND OBJECTIVE BOX
Dr. Vazquez (Nephrology)  Office [phone]  Cell [phone]  [facility]  Cell [phone]      Patient is a 72y old  Male who presents with a chief complaint of left foot pain and new rapid afib (01 Apr 2018 14:04)      Patient seen and examined at bedside. No chest pain/sob    VITALS:  T(F): 97.5 (04-05-18 @ 12:03), Max: 98.3 (04-04-18 @ 17:00)  HR: 128 (04-05-18 @ 12:03)  BP: 104/79 (04-05-18 @ 12:03)  RR: 18 (04-05-18 @ 12:03)  SpO2: 97% (04-05-18 @ 12:03)  Wt(kg): --        PHYSICAL EXAM:  Constitutional: NAD  Neck: No JVD  Respiratory: decrease BS @ base  Cardiovascular: S1, S2, tachy  Gastrointestinal: BS+, soft, NT/ND  Extremities: 3+ peripheral edema    Hospital Medications:   MEDICATIONS  (STANDING):  atorvastatin 10 milliGRAM(s) Oral at bedtime  clopidogrel Tablet 75 milliGRAM(s) Oral daily  docusate sodium 100 milliGRAM(s) Oral three times a day  furosemide   Injectable 80 milliGRAM(s) IV Push daily  heparin  Infusion.  Unit(s)/Hr (16 mL/Hr) IV Continuous <Continuous>  latanoprost 0.005% Ophthalmic Solution 1 Drop(s) Both EYES at bedtime  metoprolol tartrate 50 milliGRAM(s) Oral two times a day  nicotine -   7 mG/24Hr(s) Patch 1 Patch Transdermal daily  pantoprazole    Tablet 40 milliGRAM(s) Oral before breakfast  predniSONE   Tablet 40 milliGRAM(s) Oral daily  senna 2 Tablet(s) Oral at bedtime      LABS:  04-05    136  |  96<L>  |  81<H>  ----------------------------<  116<H>  4.6   |  25  |  2.16<H>    Ca    9.4      05 Apr 2018 05:57  Mg     2.1     04-05      Creatinine Trend: 2.16 <--, 2.44 <--, 2.59 <--, 2.63 <--, 1.97 <--, 1.78 <--, 1.45 <--, 1.50 <--                                11.3   7.08  )-----------( 223      ( 05 Apr 2018 05:57 )             39.0     Urine Studies:  Urinalysis - [04-02-18 @ 21:16]      Color YELLOW / Appearance HAZY / SG 1.030 / pH 5.0      Gluc NEGATIVE / Ketone NEGATIVE  / Bili NEGATIVE / Urobili NORMAL       Blood NEGATIVE / Protein 100 / Leuk Est NEGATIVE / Nitrite NEGATIVE      RBC 2-5 / WBC 10-25 / Hyaline >50 / Gran  / Sq Epi OCC / Non Sq Epi  / Bacteria MANY    Urine Creatinine 14.39      [04-04-18 @ 16:24]  Urine Protein < 6      [04-04-18 @ 16:24]  Urine Sodium 32      [04-02-18 @ 21:16]    PTH 70.45 (Ca --)      [04-03-18 @ 05:46]   --  Vitamin D (25OH) 53.5      [04-04-18 @ 07:08]  TSH 3.46      [04-01-18 @ 09:32]        RADIOLOGY & ADDITIONAL STUDIES:

## 2018-04-05 NOTE — CONSULT NOTE ADULT - SUBJECTIVE AND OBJECTIVE BOX
Chief Complaint:  Patient is a 72y old  Male who presents with a chief complaint of left foot pain and new rapid afib (01 Apr 2018 14:04)      HPI:  71 yo male PMHx of HTN, Gout,  HFrEF (48%), HLD, CAD with stent "years ago," PAD with RLE stent, and CKD3 p/w L 1st and 2nd toe pain, s/p unwitnessed fall 2 days ago found to be in rapid afib in the 150s, difficult to control with plan for afib ablation by EP. Now consulted for 1 episode of BRBpR.    During admission, he was found to be in gout flare which has been managed with prednisone as well as ARF thought to be prerenal secondary to CHF exacerbation, currently on IV lasix 80mg.     Patient was seen by cardiology for Afib with RVR. Unable to be adequately controlled with metoprolol. Plan for ablation on 4/6.    Overnight, nursing reported 1 episode of BRBpR that occurred while straining during bowel movement. This was his first real BM for several days.No piror GI bleed in the past. Heparin gtt held. PPT IV BID started. Repeat Hb this AM stable at patient's baseline of 11-12. BP stable.    Allergies:  No Known Allergies      Home Medications:    Hospital Medications:  atorvastatin 10 milliGRAM(s) Oral at bedtime  clopidogrel Tablet 75 milliGRAM(s) Oral daily  docusate sodium 100 milliGRAM(s) Oral three times a day  furosemide   Injectable 80 milliGRAM(s) IV Push daily  heparin  Infusion.  Unit(s)/Hr IV Continuous <Continuous>  heparin  Injectable 7000 Unit(s) IV Push every 6 hours PRN  heparin  Injectable 3500 Unit(s) IV Push every 6 hours PRN  latanoprost 0.005% Ophthalmic Solution 1 Drop(s) Both EYES at bedtime  metoprolol tartrate 50 milliGRAM(s) Oral two times a day  nicotine  Polacrilex Gum 2 milliGRAM(s) Oral every 2 hours PRN  nicotine -   7 mG/24Hr(s) Patch 1 Patch Transdermal daily  oxyCODONE    5 mG/acetaminophen 325 mG 1 Tablet(s) Oral every 6 hours PRN  pantoprazole  Injectable 40 milliGRAM(s) IV Push two times a day  predniSONE   Tablet 40 milliGRAM(s) Oral daily  senna 2 Tablet(s) Oral at bedtime  traMADol 50 milliGRAM(s) Oral every 6 hours PRN      PMHX/PSHX:  Gout  PVD (peripheral vascular disease)  CAD (coronary artery disease)  Pneumonia  Atherosclerosis of arteries  Prostate Ca  Dyslipidemia  HTN (Hypertension), Benign  Glaucoma  Stented coronary artery  Peripheral vascular disease  S/P lumbar laminectomy  S/P prostatectomy  Dyslipidemia      Family history:  Family history of diabetes mellitus (DM) (Child)  No pertinent family history in first degree relatives      Social History:     ROS:     General:  No wt loss, fevers, chills, night sweats, fatigue,   Eyes:  Good vision, no reported pain  ENT:  No sore throat, pain, runny nose, dysphagia  CV:  No pain, palpitations, hypo/hypertension  Resp:  No dyspnea, cough, tachypnea, wheezing  GI:  See HPI  :  No pain, bleeding, incontinence, nocturia  Muscle:  No pain, weakness  Neuro:  No weakness, tingling, memory problems  Psych:  No fatigue, insomnia, mood problems, depression  Endocrine:  No polyuria, polydipsia, cold/heat intolerance  Heme:  No petechiae, ecchymosis, easy bruisability  Skin:  No rash, edema      PHYSICAL EXAM:     GENERAL:  Appears stated age, well-groomed, well-nourished, no distress  HEENT:  NC/AT,  conjunctivae clear and pink,  no JVD  CHEST:  Full & symmetric excursion, no increased effort, breath sounds clear  HEART:  Regular rhythm, S1, S2, no murmur/rub/S3/S4, no abdominal bruit, no edema  ABDOMEN:  Soft, non-tender, non-distended, normoactive bowel sounds,  no masses ,  EXTREMITIES:  no cyanosis,clubbing or edema  SKIN:  No rash/erythema/ecchymoses/petechiae/wounds/abscess/warm/dry  NEURO:  Alert, oriented    Vital Signs:  Vital Signs Last 24 Hrs  T(C): 36.4 (05 Apr 2018 06:12), Max: 36.8 (04 Apr 2018 17:00)  T(F): 97.6 (05 Apr 2018 06:12), Max: 98.3 (04 Apr 2018 17:00)  HR: 126 (05 Apr 2018 06:37) (117 - 135)  BP: 107/68 (05 Apr 2018 06:37) (97/65 - 130/68)  BP(mean): --  RR: 18 (05 Apr 2018 06:12) (17 - 18)  SpO2: 95% (05 Apr 2018 06:12) (95% - 99%)  Daily     Daily     LABS:                        11.3   7.08  )-----------( 223      ( 05 Apr 2018 05:57 )             39.0     04-05    136  |  96<L>  |  81<H>  ----------------------------<  116<H>  4.6   |  25  |  2.16<H>    Ca    9.4      05 Apr 2018 05:57  Mg     2.1     04-05        PT/INR - ( 04 Apr 2018 23:25 )   PT: 14.8 SEC;   INR: 1.28          PTT - ( 05 Apr 2018 05:57 )  PTT:27.7 SEC        Imaging:

## 2018-04-05 NOTE — CONSULT NOTE ADULT - ATTENDING COMMENTS
Agree with above plan.
Patient has no contraindication to antiplatelet therapy or anticoagulation as cardiology will recommend in the treatment of A fib.   There needs to be iron studies and Hb electrophoresis to explain microcytosis and target cells and high RDW.  These may suggest a minor degree of long term blood loss beyond the hemorrhoids which will subsequently require evaluation

## 2018-04-05 NOTE — CONSULT NOTE ADULT - ASSESSMENT
73 yo male PMHx of HTN, Gout,  HFrEF (48%), HLD, CAD with stent "years ago," PAD with RLE stent, and CKD3 p/w L 1st and 2nd toe pain, s/p unwitnessed fall 2 days ago found to be in rapid afib in the 150s, difficult to control with plan for afib ablation by EP. Now consulted for 1 episode of BRBpR.    1)BRBpR  Most likely lower GI bleed  DDx: hemorrhoids vs. diverticulosis vs. ectasias vs. AVM  Patient with BRBpR occurring with 1 day of straining 2/2 constipation. Most likely this is hemorrhoidal bleeding. Hb and BP stable.    -given patient's multiple comorbidities, will hold on endoscopic evaluation for now  -would recommend colorectal surgery consult for bedside anoscopy (no sedation required) for treatment of hemorrhoids  - can discontinue PPI IV BID  -serial CBC and transfuse for Hb<7  rest of plan as per primary team  -please call back with additional questions or concerns 73 yo male PMHx of HTN, Gout,  HFrEF (48%), HLD, CAD with stent "years ago," PAD with RLE stent, and CKD3 p/w L 1st and 2nd toe pain, s/p unwitnessed fall 2 days ago found to be in rapid afib in the 150s, difficult to control with plan for afib ablation by EP. Now consulted for 1 episode of BRBpR.    1)BRBpR  Most likely lower GI bleed  DDx: hemorrhoids vs. diverticulosis vs. ectasias vs. AVM  Patient with BRBpR occurring with 1 day of straining 2/2 constipation. Most likely this is hemorrhoidal bleeding. Hb and BP stable.  2) Microcytic anemia  Target cells seen on peripheral smear. RDW high. Concerning for possible thalassemia vs. ROM    -please check iron studies, ferritin, folate, B12, Hb electrophoresis  -given patient's multiple comorbidities, will hold on endoscopic evaluation for now  -would recommend colorectal surgery consult for bedside anoscopy (no sedation required) for treatment of hemorrhoids  - can discontinue PPI IV BID  -serial CBC and transfuse for Hb<7  rest of plan as per primary team  -please call back with additional questions or concerns

## 2018-04-05 NOTE — PROGRESS NOTE ADULT - SUBJECTIVE AND OBJECTIVE BOX
Patient seen and evaluated today.  Developed significant hematochezia per RN overnight; otherwise feels generally well and is without symptoms; he was able to lie flat for a prolonged trial this morning with out dyspnea.  The patient is otherwise now amenable to RF ablation of his SVT.  GI consulted.  Telemetry review demonstrates persistent AT with heart rates HR: 126 (04-05-18 @ 06:37) (117 - 135).    MEDICATIONS:  clopidogrel Tablet 75 milliGRAM(s) Oral daily  furosemide   Injectable 80 milliGRAM(s) IV Push daily  heparin  Infusion.  Unit(s)/Hr IV Continuous <Continuous>  heparin  Injectable 7000 Unit(s) IV Push every 6 hours PRN  heparin  Injectable 3500 Unit(s) IV Push every 6 hours PRN  metoprolol tartrate 50 milliGRAM(s) Oral two times a day    atorvastatin 10 milliGRAM(s) Oral at bedtime  clopidogrel Tablet 75 milliGRAM(s) Oral daily  docusate sodium 100 milliGRAM(s) Oral three times a day  furosemide   Injectable 80 milliGRAM(s) IV Push daily  heparin  Infusion.  Unit(s)/Hr IV Continuous <Continuous>  heparin  Injectable 7000 Unit(s) IV Push every 6 hours PRN  heparin  Injectable 3500 Unit(s) IV Push every 6 hours PRN  latanoprost 0.005% Ophthalmic Solution 1 Drop(s) Both EYES at bedtime  metoprolol tartrate 50 milliGRAM(s) Oral two times a day  nicotine  Polacrilex Gum 2 milliGRAM(s) Oral every 2 hours PRN  nicotine -   7 mG/24Hr(s) Patch 1 Patch Transdermal daily  oxyCODONE    5 mG/acetaminophen 325 mG 1 Tablet(s) Oral every 6 hours PRN  pantoprazole  Injectable 40 milliGRAM(s) IV Push two times a day  predniSONE   Tablet 40 milliGRAM(s) Oral daily  senna 2 Tablet(s) Oral at bedtime  traMADol 50 milliGRAM(s) Oral every 6 hours PRN      REVIEW OF SYSTEMS:  CONSTITUTIONAL: No fever, weight loss, or fatigue  NECK: No pain or stiffness  RESPIRATORY: No cough, wheezing, chills or hemoptysis; No Shortness of Breath  CARDIOVASCULAR: No chest pain, palpitations, passing out, dizziness, or leg swelling  GASTROINTESTINAL: No abdominal or epigastric pain. No nausea, vomiting, or hematemesis; No diarrhea or constipation. No melena or hematochezia.  NEUROLOGICAL: No headaches, memory loss, loss of strength, numbness, or tremors  SKIN: No itching, burning, rashes, or lesions   PSYCHIATRIC: No depression, anxiety, mood swings, or difficulty sleeping  HEME/LYMPH: No easy bruising, or bleeding gums  ALLERGY AND IMMUNOLOGIC: No hives or eczema	  All others negative	    PHYSICAL EXAM:  T(C): 36.4 (04-05-18 @ 06:12), Max: 36.8 (04-04-18 @ 17:00)  HR: 126 (04-05-18 @ 06:37) (117 - 135)  BP: 107/68 (04-05-18 @ 06:37) (97/65 - 130/68)  RR: 18 (04-05-18 @ 06:12) (17 - 18)  SpO2: 95% (04-05-18 @ 06:12) (95% - 99%)  Wt(kg): --  I&O's Summary      Appearance: Normal	  HEENT:   Normal oral mucosa, PERRL, EOMI	  Lymphatic: No lymphadenopathy  Cardiovascular: Normal S1 S2, No JVD, No murmurs, No edema  Respiratory: Lungs clear to auscultation	  Psychiatry: A & O x 3, Mood & affect appropriate  Gastrointestinal:  Soft, Non-tender, + BS	  Skin: No rashes, No ecchymoses, No cyanosis	  Neurologic: Non-focal  Extremities: Normal range of motion, No clubbing, cyanosis or edema  Vascular: Peripheral pulses palpable 2+ bilaterally    DIAGNOSTICS:  TELEMETRY: 	      12 Lead ECG:   Ventricular Rate 122 BPM    Atrial Rate 122 BPM    P-R Interval 184 ms    QRS Duration 92 ms    Q-T Interval 406 ms    QTC Calculation(Bezet) 578 ms    R Axis 157 degrees    T Axis -18 degrees    Diagnosis Line Sinus tachycardia with Possible Premature atrial complexes with Aberrant conduction  Low voltage QRS  Left posterior fascicular block  Cannot rule out Inferior infarct , age undetermined  Abnormal ECG (04-02-18 @ 13:52)    LABS:	 	                          11.3   7.08  )-----------( 223      ( 05 Apr 2018 05:57 )             39.0     04-05    136  |  96<L>  |  81<H>  ----------------------------<  116<H>  4.6   |  25  |  2.16<H>    Ca    9.4      05 Apr 2018 05:57  Mg     2.1     04-05      proBNP:   rosy

## 2018-04-05 NOTE — PROGRESS NOTE ADULT - PROBLEM SELECTOR PLAN 1
FENa<1% hypervolemic on exam likely prerenal state sec to CHF exacerbation. renal function better today  on asix 80mg iv daily. Heart rate needs to be better controlled to improve renal perfusion. monitor bmp  elevated PTH check vit d 25 FENa<1% hypervolemic on exam likely prerenal state sec to CHF exacerbation. renal function better today  on lasix 80mg iv daily. Heart rate needs to be better controlled to improve renal perfusion. monitor bmp  elevated PTH check vit d 25

## 2018-04-05 NOTE — CHART NOTE - NSCHARTNOTEFT_GEN_A_CORE
RN notified that patient was found to have BRBPR after a bowel movement. Patient did not have BM for few days until 4/4. He had small BM earlier on 4/4, then during the evening. He was straining for the BM. Patient was seen at bedside. Patient states he never seen blood in his stool before and it was the first time ever. Patient also been having rapid afib on tele all day. He received cardizem IV 10 x2, metoprolol 50 and metoprolol 5 IV Denies dizziness, palpitations or chest pain. Vitals: 112/69, HR: 130. Heart: +Tachycardia, irregular, Lungs: CTA.   Patient was given metoprolol 25mg PO for afib with RVR. stat cbc and coags sent. hep gtt was held. stool occult ordered Protonix 40 IV BID started. Discussed with , miguel ángel CHAMPAGNE.

## 2018-04-05 NOTE — PROGRESS NOTE ADULT - SUBJECTIVE AND OBJECTIVE BOX
Subjective: Patient seen and examined. No new events except as noted.   patient seen at 10 AM today  Patient was constipated last night,given stool softener, strained at stool and had bright red blood per rectum  Patient denies shortness of breath chest pain or palpitations overall feels well  Patient is scheduled for NATALY cardioversion  MEDICATIONS:  MEDICATIONS  (STANDING):  atorvastatin 10 milliGRAM(s) Oral at bedtime  clopidogrel Tablet 75 milliGRAM(s) Oral daily  docusate sodium 100 milliGRAM(s) Oral three times a day  furosemide   Injectable 80 milliGRAM(s) IV Push daily  heparin  Infusion.  Unit(s)/Hr (16 mL/Hr) IV Continuous <Continuous>  latanoprost 0.005% Ophthalmic Solution 1 Drop(s) Both EYES at bedtime  metoprolol tartrate 50 milliGRAM(s) Oral two times a day  nicotine -   7 mG/24Hr(s) Patch 1 Patch Transdermal daily  pantoprazole    Tablet 40 milliGRAM(s) Oral before breakfast  predniSONE   Tablet 40 milliGRAM(s) Oral daily  senna 2 Tablet(s) Oral at bedtime      PHYSICAL EXAM:  T(C): 36.4 (04-05-18 @ 12:03), Max: 36.7 (04-04-18 @ 20:51)  HR: 128 (04-05-18 @ 12:03) (117 - 132)  BP: 104/79 (04-05-18 @ 12:03) (97/65 - 112/73)  RR: 18 (04-05-18 @ 12:03) (17 - 18)  SpO2: 97% (04-05-18 @ 12:03) (95% - 99%)  Wt(kg): --  I&O's Summary        Appearance: Normalno acute distress	  HEENT:   Normal oral mucosa, PERRL, EOMI	  Cardiovascular: Normal S1 S2, No JVD, No murmurs ,  Respiratory: Lungs decreased breath sounds at bases	  Gastrointestinal:  Soft, Non-tender, + BS	  Skin: No rashes, No ecchymoses, No cyanosis, warm to touch  Musculoskeletal: Normal range of motion, normal strength  Psychiatry:  Mood & affect appropriate  Ext: 2+ edema      LABS:    CARDIAC MARKERS:                                11.3   7.08  )-----------( 223      ( 05 Apr 2018 05:57 )             39.0     04-05    136  |  96<L>  |  81<H>  ----------------------------<  116<H>  4.6   |  25  |  2.16<H>    Ca    9.4      05 Apr 2018 05:57  Mg     2.1     04-05

## 2018-04-05 NOTE — CHART NOTE - NSCHARTNOTEFT_GEN_A_CORE
Atrial flutter ablation  Via Right femoral vein.  General anesthesia by Dr. Osmany Ascencio  NATALY done by Dr. Blas Canas: EF of 30%, no KEISHA thrombus.   Findings: CTI atrial flutter; line of block after flutter terminated with RF Trans-isthmus time 160 msec.   Hemostasis achieve.  Plan; AC in 6 hours. Treatment of acute systolic CHF (EF of 30%) which may be tachycardia induced.     Justus Centeno MD

## 2018-04-05 NOTE — PROGRESS NOTE ADULT - SUBJECTIVE AND OBJECTIVE BOX
Discussed case with house GI team; per our discussion there are no contraindications to proceeding with NATALY and ablation for this patient, today. Discussed with NATALY and EP staff.

## 2018-04-06 LAB
24R-OH-CALCIDIOL SERPL-MCNC: 49.5 NG/ML — SIGNIFICANT CHANGE UP (ref 30–80)
APTT BLD: 26.7 SEC — LOW (ref 27.5–37.4)
BUN SERPL-MCNC: 73 MG/DL — HIGH (ref 7–23)
BUN SERPL-MCNC: 74 MG/DL — HIGH (ref 7–23)
CALCIUM SERPL-MCNC: 9.4 MG/DL — SIGNIFICANT CHANGE UP (ref 8.4–10.5)
CALCIUM SERPL-MCNC: 9.4 MG/DL — SIGNIFICANT CHANGE UP (ref 8.4–10.5)
CHLORIDE SERPL-SCNC: 90 MMOL/L — LOW (ref 98–107)
CHLORIDE SERPL-SCNC: 92 MMOL/L — LOW (ref 98–107)
CO2 SERPL-SCNC: 27 MMOL/L — SIGNIFICANT CHANGE UP (ref 22–31)
CO2 SERPL-SCNC: 29 MMOL/L — SIGNIFICANT CHANGE UP (ref 22–31)
CREAT SERPL-MCNC: 2.02 MG/DL — HIGH (ref 0.5–1.3)
CREAT SERPL-MCNC: 2.08 MG/DL — HIGH (ref 0.5–1.3)
GLUCOSE SERPL-MCNC: 108 MG/DL — HIGH (ref 70–99)
GLUCOSE SERPL-MCNC: 131 MG/DL — HIGH (ref 70–99)
HCT VFR BLD CALC: 39.1 % — SIGNIFICANT CHANGE UP (ref 39–50)
HGB BLD-MCNC: 11.3 G/DL — LOW (ref 13–17)
MAGNESIUM SERPL-MCNC: 2 MG/DL — SIGNIFICANT CHANGE UP (ref 1.6–2.6)
MAGNESIUM SERPL-MCNC: 2.1 MG/DL — SIGNIFICANT CHANGE UP (ref 1.6–2.6)
MCHC RBC-ENTMCNC: 22.2 PG — LOW (ref 27–34)
MCHC RBC-ENTMCNC: 28.9 % — LOW (ref 32–36)
MCV RBC AUTO: 76.8 FL — LOW (ref 80–100)
NRBC # FLD: 0.06 — SIGNIFICANT CHANGE UP
NRBC FLD-RTO: 1.2 — SIGNIFICANT CHANGE UP
PHOSPHATE SERPL-MCNC: 4.4 MG/DL — SIGNIFICANT CHANGE UP (ref 2.5–4.5)
PLATELET # BLD AUTO: 195 K/UL — SIGNIFICANT CHANGE UP (ref 150–400)
PMV BLD: SIGNIFICANT CHANGE UP FL (ref 7–13)
POTASSIUM SERPL-MCNC: 4.9 MMOL/L — SIGNIFICANT CHANGE UP (ref 3.5–5.3)
POTASSIUM SERPL-MCNC: 6.5 MMOL/L — CRITICAL HIGH (ref 3.5–5.3)
POTASSIUM SERPL-SCNC: 4.9 MMOL/L — SIGNIFICANT CHANGE UP (ref 3.5–5.3)
POTASSIUM SERPL-SCNC: 6.5 MMOL/L — CRITICAL HIGH (ref 3.5–5.3)
RBC # BLD: 5.09 M/UL — SIGNIFICANT CHANGE UP (ref 4.2–5.8)
RBC # FLD: 21.4 % — HIGH (ref 10.3–14.5)
SODIUM SERPL-SCNC: 135 MMOL/L — SIGNIFICANT CHANGE UP (ref 135–145)
SODIUM SERPL-SCNC: 138 MMOL/L — SIGNIFICANT CHANGE UP (ref 135–145)
WBC # BLD: 5.06 K/UL — SIGNIFICANT CHANGE UP (ref 3.8–10.5)
WBC # FLD AUTO: 5.06 K/UL — SIGNIFICANT CHANGE UP (ref 3.8–10.5)

## 2018-04-06 RX ORDER — CALCITRIOL 0.5 UG/1
0.25 CAPSULE ORAL DAILY
Qty: 0 | Refills: 0 | Status: DISCONTINUED | OUTPATIENT
Start: 2018-04-06 | End: 2018-04-09

## 2018-04-06 RX ORDER — METOPROLOL TARTRATE 50 MG
50 TABLET ORAL ONCE
Qty: 0 | Refills: 0 | Status: COMPLETED | OUTPATIENT
Start: 2018-04-06 | End: 2018-04-06

## 2018-04-06 RX ORDER — METOPROLOL TARTRATE 50 MG
50 TABLET ORAL DAILY
Qty: 0 | Refills: 0 | Status: DISCONTINUED | OUTPATIENT
Start: 2018-04-07 | End: 2018-04-09

## 2018-04-06 RX ORDER — APIXABAN 2.5 MG/1
5 TABLET, FILM COATED ORAL EVERY 12 HOURS
Qty: 0 | Refills: 0 | Status: DISCONTINUED | OUTPATIENT
Start: 2018-04-06 | End: 2018-04-09

## 2018-04-06 RX ADMIN — CALCITRIOL 0.25 MICROGRAM(S): 0.5 CAPSULE ORAL at 15:06

## 2018-04-06 RX ADMIN — Medication 50 MILLIGRAM(S): at 19:00

## 2018-04-06 RX ADMIN — LATANOPROST 1 DROP(S): 0.05 SOLUTION/ DROPS OPHTHALMIC; TOPICAL at 01:09

## 2018-04-06 RX ADMIN — Medication 50 MILLIGRAM(S): at 01:11

## 2018-04-06 RX ADMIN — Medication 100 MILLIGRAM(S): at 15:06

## 2018-04-06 RX ADMIN — Medication 1 PATCH: at 13:00

## 2018-04-06 RX ADMIN — ATORVASTATIN CALCIUM 10 MILLIGRAM(S): 80 TABLET, FILM COATED ORAL at 01:09

## 2018-04-06 RX ADMIN — Medication 100 MILLIGRAM(S): at 09:54

## 2018-04-06 RX ADMIN — ATORVASTATIN CALCIUM 10 MILLIGRAM(S): 80 TABLET, FILM COATED ORAL at 22:19

## 2018-04-06 RX ADMIN — Medication 2 MILLIGRAM(S): at 22:20

## 2018-04-06 RX ADMIN — PANTOPRAZOLE SODIUM 40 MILLIGRAM(S): 20 TABLET, DELAYED RELEASE ORAL at 05:52

## 2018-04-06 RX ADMIN — CLOPIDOGREL BISULFATE 75 MILLIGRAM(S): 75 TABLET, FILM COATED ORAL at 15:06

## 2018-04-06 RX ADMIN — Medication 50 MILLIGRAM(S): at 09:54

## 2018-04-06 RX ADMIN — APIXABAN 5 MILLIGRAM(S): 2.5 TABLET, FILM COATED ORAL at 19:00

## 2018-04-06 RX ADMIN — LATANOPROST 1 DROP(S): 0.05 SOLUTION/ DROPS OPHTHALMIC; TOPICAL at 22:19

## 2018-04-06 RX ADMIN — Medication 80 MILLIGRAM(S): at 05:49

## 2018-04-06 RX ADMIN — Medication 40 MILLIGRAM(S): at 05:52

## 2018-04-06 NOTE — PROGRESS NOTE ADULT - PROBLEM SELECTOR PLAN 1
FENa<1% hypervolemic on exam likely prerenal state sec to CHF exacerbation. renal function better today  on lasix 80mg iv daily. monitor bmp FENa<1% hypervolemic on exam likely prerenal state sec to CHF exacerbation. renal function better today  on lasix 80mg iv daily. monitor bmp  elevated PTH with normal vit d. start calcitriol 0.25mcg daily

## 2018-04-06 NOTE — PROGRESS NOTE ADULT - SUBJECTIVE AND OBJECTIVE BOX
Dr. Vzaquez (Nephrology)  Office [phone]  Cell [phone]  [facility]  Cell [phone]      Patient is a 72y old  Male who presents with a chief complaint of left foot pain and new rapid afib (01 Apr 2018 14:04)      Patient seen and examined at bedside. No chest pain/sob    VITALS:  T(F): 98 (04-06-18 @ 09:54), Max: 98 (04-06-18 @ 09:54)  HR: 74 (04-06-18 @ 09:54)  BP: 112/80 (04-06-18 @ 09:54)  RR: 17 (04-06-18 @ 09:54)  SpO2: 97% (04-06-18 @ 09:54)  Wt(kg): --    04-05 @ 07:01  -  04-06 @ 07:00  --------------------------------------------------------  IN: 200 mL / OUT: 275 mL / NET: -75 mL          PHYSICAL EXAM:  Constitutional: NAD  Neck: No JVD  Respiratory: decrease BS at base   Cardiovascular: S1, S2, RRR  Gastrointestinal: BS+, soft, NT/ND  Extremities: 2+ peripheral edema    Hospital Medications:   MEDICATIONS  (STANDING):  apixaban 5 milliGRAM(s) Oral every 12 hours  atorvastatin 10 milliGRAM(s) Oral at bedtime  clopidogrel Tablet 75 milliGRAM(s) Oral daily  docusate sodium 100 milliGRAM(s) Oral three times a day  furosemide   Injectable 80 milliGRAM(s) IV Push daily  latanoprost 0.005% Ophthalmic Solution 1 Drop(s) Both EYES at bedtime  metoprolol tartrate 50 milliGRAM(s) Oral two times a day  nicotine -   7 mG/24Hr(s) Patch 1 Patch Transdermal daily  pantoprazole    Tablet 40 milliGRAM(s) Oral before breakfast  senna 2 Tablet(s) Oral at bedtime      LABS:  04-06    138  |  92<L>  |  74<H>  ----------------------------<  131<H>  4.9   |  29  |  2.08<H>    Ca    9.4      06 Apr 2018 10:39  Phos  4.4     04-06  Mg     2.0     04-06      Creatinine Trend: 2.08 <--, 2.02 <--, 2.16 <--, 2.44 <--, 2.59 <--, 2.63 <--, 1.97 <--, 1.78 <--, 1.45 <--, 1.50 <--    Phosphorus Level, Serum: 4.4 mg/dL (04-06 @ 10:39)  Vitamin D, 25-Hydroxy: 49.5 ng/mL (04-05 @ 18:42)  Ferritin, Serum: 33.85 ng/mL (04-05 @ 18:42)  Iron Total, Serum: 13 ug/dL (04-05 @ 18:42)    calcium--  intact pth--  parathyroid hormone intact, [id number]                            11.3   5.06  )-----------( 195      ( 06 Apr 2018 07:36 )             39.1     Urine Studies:  Urinalysis - [04-02-18 @ 21:16]      Color YELLOW / Appearance HAZY / SG 1.030 / pH 5.0      Gluc NEGATIVE / Ketone NEGATIVE  / Bili NEGATIVE / Urobili NORMAL       Blood NEGATIVE / Protein 100 / Leuk Est NEGATIVE / Nitrite NEGATIVE      RBC 2-5 / WBC 10-25 / Hyaline >50 / Gran  / Sq Epi OCC / Non Sq Epi  / Bacteria MANY    Urine Creatinine 14.39      [04-04-18 @ 16:24]  Urine Protein < 6      [04-04-18 @ 16:24]  Urine Sodium 32      [04-02-18 @ 21:16]    Iron 13, TIBC 354, %sat --      [04-05-18 @ 18:42]  Ferritin 33.85      [04-05-18 @ 18:42]  .0 (Ca --)      [04-05-18 @ 18:42]   --  PTH 70.45 (Ca --)      [04-03-18 @ 05:46]   --  Vitamin D (25OH) 49.5      [04-05-18 @ 18:42]  TSH 3.46      [04-01-18 @ 09:32]        RADIOLOGY & ADDITIONAL STUDIES:

## 2018-04-06 NOTE — PROGRESS NOTE ADULT - SUBJECTIVE AND OBJECTIVE BOX
Patient is a 72y old  Male who presents with a chief complaint of left foot pain and new rapid afib (01 Apr 2018 14:04)      SUBJECTIVE / OVERNIGHT EVENTS:  s/p ablation.  now in sinus.  "I am here for foot pain"  he denied cp, sob.   He is more concerned about getting his TV fixed than speaking to the provider.       Vital Signs Last 24 Hrs  T(C): 36.7 (06 Apr 2018 09:54), Max: 36.7 (06 Apr 2018 09:54)  T(F): 98 (06 Apr 2018 09:54), Max: 98 (06 Apr 2018 09:54)  HR: 74 (06 Apr 2018 09:54) (67 - 79)  BP: 112/80 (06 Apr 2018 09:54) (99/80 - 120/72)  BP(mean): --  RR: 17 (06 Apr 2018 09:54) (15 - 32)  SpO2: 97% (06 Apr 2018 09:54) (94% - 100%)  I&O's Summary    05 Apr 2018 07:01  -  06 Apr 2018 07:00  --------------------------------------------------------  IN: 200 mL / OUT: 275 mL / NET: -75 mL    06 Apr 2018 07:01  -  06 Apr 2018 12:23  --------------------------------------------------------  IN: 240 mL / OUT: 350 mL / NET: -110 mL        PHYSICAL EXAM:  GENERAL: NAD, Comfortable, elderly with stutter and slow speech baseline  HEAD:  Atraumatic, Normocephalic  EYES: EOMI, PERRLA, conjunctiva and sclera clear  NECK: Supple, No JVD  CHEST/LUNG: Clear to auscultation bilaterally; No wheeze  HEART: Regular rate and rhythm; No murmurs, rubs, or gallops  ABDOMEN: Soft, Nontender, Nondistended; Bowel sounds present  Neuro: AAO x 3, no focal deficit, 5/5 b/l extremities  EXTREMITIES:  2+ Peripheral Pulses, No clubbing, cyanosis. +edema b/l foot. left toe 1st and 2nd toe pain, improved today. no erythema.   SKIN: No rashes or lesions      LABS:                        11.3   5.06  )-----------( 195      ( 06 Apr 2018 07:36 )             39.1     04-06    138  |  92<L>  |  74<H>  ----------------------------<  131<H>  4.9   |  29  |  2.08<H>    Ca    9.4      06 Apr 2018 10:39  Phos  4.4     04-06  Mg     2.0     04-06      PT/INR - ( 05 Apr 2018 16:19 )   PT: 16.5 SEC;   INR: 1.42          PTT - ( 06 Apr 2018 07:36 )  PTT:26.7 SEC  CAPILLARY BLOOD GLUCOSE                RADIOLOGY & ADDITIONAL TESTS:    Imaging Personally Reviewed:  [x] YES  [ ] NO    Consultant(s) Notes Reviewed:  [x] YES  [ ] NO      MEDICATIONS  (STANDING):  apixaban 5 milliGRAM(s) Oral every 12 hours  atorvastatin 10 milliGRAM(s) Oral at bedtime  calcitriol   Capsule 0.25 MICROGram(s) Oral daily  clopidogrel Tablet 75 milliGRAM(s) Oral daily  docusate sodium 100 milliGRAM(s) Oral three times a day  furosemide   Injectable 80 milliGRAM(s) IV Push daily  latanoprost 0.005% Ophthalmic Solution 1 Drop(s) Both EYES at bedtime  metoprolol tartrate 50 milliGRAM(s) Oral two times a day  nicotine -   7 mG/24Hr(s) Patch 1 Patch Transdermal daily  pantoprazole    Tablet 40 milliGRAM(s) Oral before breakfast  senna 2 Tablet(s) Oral at bedtime    MEDICATIONS  (PRN):  nicotine  Polacrilex Gum 2 milliGRAM(s) Oral every 2 hours PRN breakthrough cravings  oxyCODONE    5 mG/acetaminophen 325 mG 1 Tablet(s) Oral every 6 hours PRN Severe Pain (7 - 10)  traMADol 50 milliGRAM(s) Oral every 6 hours PRN mild and moderate pain      Care Discussed with Consultants/Other Providers [x] YES  [ ] NO    HEALTH ISSUES - PROBLEM Dx:  Proteinuria: Proteinuria  Hyperkalemia: Hyperkalemia  Edema leg: Edema leg  Toe pain, left: Toe pain, left  MARY GRACE (acute kidney injury): MARY GRACE (acute kidney injury)  Acute renal failure superimposed on stage 1 chronic kidney disease, unspecified acute renal failure type: Acute renal failure superimposed on stage 1 chronic kidney disease, unspecified acute renal failure type  Need for prophylactic measure: Need for prophylactic measure  HTN (Hypertension), Benign: HTN (Hypertension), Benign  Glaucoma: Glaucoma  Dyslipidemia: Dyslipidemia  Gout attack: Gout attack  Atrial fibrillation with RVR: Atrial fibrillation with RVR

## 2018-04-06 NOTE — PROGRESS NOTE ADULT - SUBJECTIVE AND OBJECTIVE BOX
Patient seen and evaluated today.  No significant events overnight.  Feels generally well; he is s/p NATALY/AFlutter ablation; currently in NSR with occasional PVCs; with heart rates 74 (04-06-18 @ 09:54) (67 - 128).    MEDICATIONS:  apixaban 5 milliGRAM(s) Oral every 12 hours  clopidogrel Tablet 75 milliGRAM(s) Oral daily  furosemide   Injectable 80 milliGRAM(s) IV Push daily  metoprolol tartrate 50 milliGRAM(s) Oral two times a day    apixaban 5 milliGRAM(s) Oral every 12 hours  atorvastatin 10 milliGRAM(s) Oral at bedtime  calcitriol   Capsule 0.25 MICROGram(s) Oral daily  clopidogrel Tablet 75 milliGRAM(s) Oral daily  docusate sodium 100 milliGRAM(s) Oral three times a day  furosemide   Injectable 80 milliGRAM(s) IV Push daily  latanoprost 0.005% Ophthalmic Solution 1 Drop(s) Both EYES at bedtime  metoprolol tartrate 50 milliGRAM(s) Oral two times a day  nicotine  Polacrilex Gum 2 milliGRAM(s) Oral every 2 hours PRN  nicotine -   7 mG/24Hr(s) Patch 1 Patch Transdermal daily  oxyCODONE    5 mG/acetaminophen 325 mG 1 Tablet(s) Oral every 6 hours PRN  pantoprazole    Tablet 40 milliGRAM(s) Oral before breakfast  senna 2 Tablet(s) Oral at bedtime  traMADol 50 milliGRAM(s) Oral every 6 hours PRN      REVIEW OF SYSTEMS:  CONSTITUTIONAL: No fever, weight loss, or fatigue  NECK: No pain or stiffness  RESPIRATORY: No cough, wheezing, chills or hemoptysis; No Shortness of Breath  CARDIOVASCULAR: No chest pain, palpitations, passing out, dizziness, or leg swelling  GASTROINTESTINAL: No abdominal or epigastric pain. No nausea, vomiting, or hematemesis; No diarrhea or constipation. No melena or hematochezia.  NEUROLOGICAL: No headaches, memory loss, loss of strength, numbness, or tremors  SKIN: No itching, burning, rashes, or lesions   PSYCHIATRIC: No depression, anxiety, mood swings, or difficulty sleeping  HEME/LYMPH: No easy bruising, or bleeding gums  ALLERGY AND IMMUNOLOGIC: No hives or eczema	  All others negative	    PHYSICAL EXAM:  T(C): 36.7 (04-06-18 @ 09:54), Max: 36.7 (04-06-18 @ 09:54)  HR: 74 (04-06-18 @ 09:54) (67 - 128)  BP: 112/80 (04-06-18 @ 09:54) (99/80 - 120/72)  RR: 17 (04-06-18 @ 09:54) (15 - 32)  SpO2: 97% (04-06-18 @ 09:54) (94% - 100%)  Wt(kg): --  I&O's Summary    05 Apr 2018 07:01  -  06 Apr 2018 07:00  --------------------------------------------------------  IN: 200 mL / OUT: 275 mL / NET: -75 mL        Appearance: Normal	  HEENT:   Normal oral mucosa, PERRL, EOMI	  Lymphatic: No lymphadenopathy  Cardiovascular: Normal S1 S2, No JVD, No murmurs, No edema  Respiratory: Lungs clear to auscultation	  Psychiatry: A & O x 3, Mood & affect appropriate  Gastrointestinal:  Soft, Non-tender, + BS	  Skin: No rashes, No ecchymoses, No cyanosis	  Neurologic: Non-focal  Extremities: Normal range of motion, No clubbing, cyanosis or edema  Vascular: Peripheral pulses palpable 2+ bilaterally    DIAGNOSTICS:  TELEMETRY: 	      12 Lead ECG:   Ventricular Rate 122 BPM    Atrial Rate 122 BPM    P-R Interval 184 ms    QRS Duration 92 ms    Q-T Interval 406 ms    QTC Calculation(Bezet) 578 ms    R Axis 157 degrees    T Axis -18 degrees    LABS:	 	                          11.3   5.06  )-----------( 195      ( 06 Apr 2018 07:36 )             39.1     04-06    138  |  92<L>  |  74<H>  ----------------------------<  131<H>  4.9   |  29  |  2.08<H>    Ca    9.4      06 Apr 2018 10:39  Phos  4.4     04-06  Mg     2.0     04-06      proBNP:

## 2018-04-06 NOTE — PROGRESS NOTE ADULT - PROBLEM SELECTOR PLAN 1
resolved. s/ NATALY with ablation.   EP eval appreciated.   cardiac enzymes x 2 neg  c/w metoprolol 50 mg bid  started on Eliquis.   TTE - mild global LV dysfunction with RV overload  cardiology/EP appreciated

## 2018-04-06 NOTE — CHART NOTE - NSCHARTNOTEFT_GEN_A_CORE
TELE night coverage:    4/6: Right femoral groin checked s/p atrial flutter ablation. Patient is asymptomatic, no chest pain, dizziness, numbness down right leg noted  Site checked, gauze is clean, dry and intact.  No pain noted around site.  No signs of infection noted.  B/L femoral pulses are intact.  Will start Eliquis 5mg in AM     Pat NORMAN  [id number]

## 2018-04-07 LAB
-  AMIKACIN: SIGNIFICANT CHANGE UP
-  AMPICILLIN/SULBACTAM: SIGNIFICANT CHANGE UP
-  AMPICILLIN: SIGNIFICANT CHANGE UP
-  AZTREONAM: SIGNIFICANT CHANGE UP
-  CEFAZOLIN: SIGNIFICANT CHANGE UP
-  CEFEPIME: SIGNIFICANT CHANGE UP
-  CEFOXITIN: SIGNIFICANT CHANGE UP
-  CEFTAZIDIME: SIGNIFICANT CHANGE UP
-  CEFTRIAXONE: SIGNIFICANT CHANGE UP
-  CIPROFLOXACIN: SIGNIFICANT CHANGE UP
-  ERTAPENEM: SIGNIFICANT CHANGE UP
-  GENTAMICIN: SIGNIFICANT CHANGE UP
-  IMIPENEM: SIGNIFICANT CHANGE UP
-  LEVOFLOXACIN: SIGNIFICANT CHANGE UP
-  MEROPENEM: SIGNIFICANT CHANGE UP
-  NITROFURANTOIN: SIGNIFICANT CHANGE UP
-  PIPERACILLIN/TAZOBACTAM: SIGNIFICANT CHANGE UP
-  TIGECYCLINE: SIGNIFICANT CHANGE UP
-  TOBRAMYCIN: SIGNIFICANT CHANGE UP
-  TRIMETHOPRIM/SULFAMETHOXAZOLE: SIGNIFICANT CHANGE UP
BACTERIA UR CULT: SIGNIFICANT CHANGE UP
BUN SERPL-MCNC: 69 MG/DL — HIGH (ref 7–23)
CALCIUM SERPL-MCNC: 9.9 MG/DL — SIGNIFICANT CHANGE UP (ref 8.4–10.5)
CHLORIDE SERPL-SCNC: 93 MMOL/L — LOW (ref 98–107)
CO2 SERPL-SCNC: 30 MMOL/L — SIGNIFICANT CHANGE UP (ref 22–31)
CREAT SERPL-MCNC: 1.84 MG/DL — HIGH (ref 0.5–1.3)
GLUCOSE SERPL-MCNC: 103 MG/DL — HIGH (ref 70–99)
HCT VFR BLD CALC: 37.9 % — LOW (ref 39–50)
HGB BLD-MCNC: 11.2 G/DL — LOW (ref 13–17)
MAGNESIUM SERPL-MCNC: 2.1 MG/DL — SIGNIFICANT CHANGE UP (ref 1.6–2.6)
MCHC RBC-ENTMCNC: 22.4 PG — LOW (ref 27–34)
MCHC RBC-ENTMCNC: 29.6 % — LOW (ref 32–36)
MCV RBC AUTO: 75.6 FL — LOW (ref 80–100)
METHOD TYPE: SIGNIFICANT CHANGE UP
NRBC # FLD: 0.04 — SIGNIFICANT CHANGE UP
ORGANISM # SPEC MICROSCOPIC CNT: SIGNIFICANT CHANGE UP
ORGANISM # SPEC MICROSCOPIC CNT: SIGNIFICANT CHANGE UP
PLATELET # BLD AUTO: 211 K/UL — SIGNIFICANT CHANGE UP (ref 150–400)
PMV BLD: SIGNIFICANT CHANGE UP FL (ref 7–13)
POTASSIUM SERPL-MCNC: 4.1 MMOL/L — SIGNIFICANT CHANGE UP (ref 3.5–5.3)
POTASSIUM SERPL-SCNC: 4.1 MMOL/L — SIGNIFICANT CHANGE UP (ref 3.5–5.3)
RBC # BLD: 5.01 M/UL — SIGNIFICANT CHANGE UP (ref 4.2–5.8)
RBC # FLD: 21.2 % — HIGH (ref 10.3–14.5)
SODIUM SERPL-SCNC: 137 MMOL/L — SIGNIFICANT CHANGE UP (ref 135–145)
WBC # BLD: 6.93 K/UL — SIGNIFICANT CHANGE UP (ref 3.8–10.5)
WBC # FLD AUTO: 6.93 K/UL — SIGNIFICANT CHANGE UP (ref 3.8–10.5)

## 2018-04-07 RX ADMIN — ATORVASTATIN CALCIUM 10 MILLIGRAM(S): 80 TABLET, FILM COATED ORAL at 22:20

## 2018-04-07 RX ADMIN — CALCITRIOL 0.25 MICROGRAM(S): 0.5 CAPSULE ORAL at 14:55

## 2018-04-07 RX ADMIN — APIXABAN 5 MILLIGRAM(S): 2.5 TABLET, FILM COATED ORAL at 18:59

## 2018-04-07 RX ADMIN — Medication 80 MILLIGRAM(S): at 05:59

## 2018-04-07 RX ADMIN — Medication 1 PATCH: at 13:00

## 2018-04-07 RX ADMIN — LATANOPROST 1 DROP(S): 0.05 SOLUTION/ DROPS OPHTHALMIC; TOPICAL at 22:20

## 2018-04-07 RX ADMIN — PANTOPRAZOLE SODIUM 40 MILLIGRAM(S): 20 TABLET, DELAYED RELEASE ORAL at 06:05

## 2018-04-07 RX ADMIN — Medication 100 MILLIGRAM(S): at 14:55

## 2018-04-07 RX ADMIN — APIXABAN 5 MILLIGRAM(S): 2.5 TABLET, FILM COATED ORAL at 05:59

## 2018-04-07 RX ADMIN — CLOPIDOGREL BISULFATE 75 MILLIGRAM(S): 75 TABLET, FILM COATED ORAL at 14:55

## 2018-04-07 RX ADMIN — Medication 50 MILLIGRAM(S): at 06:08

## 2018-04-07 RX ADMIN — Medication 1 PATCH: at 14:54

## 2018-04-07 NOTE — PROGRESS NOTE ADULT - SUBJECTIVE AND OBJECTIVE BOX
RHETT HOPSON  HPI:  73 yo male PMHx of HTN, Gout, HLD, CAD with stent "years ago," PAD with RLE stent, and CKD3 p/w L 1st and 2nd toe pain, s/p unwitnessed fall 2 days ago. He has CKD and Acute Gout, and is on diuretics IV. He is passing good amounts of urine but he stated that the swelling is the same, but the pain is better.    HEALTH ISSUES - PROBLEM Dx:  Proteinuria: Proteinuria  Hyperkalemia: Hyperkalemia  Edema leg: Edema leg  Toe pain, left: Toe pain, left  MARY GRACE (acute kidney injury): MARY GRACE (acute kidney injury)  Acute renal failure superimposed on stage 1 chronic kidney disease, unspecified acute renal failure type: Acute renal failure superimposed on stage 1 chronic kidney disease, unspecified acute renal failure type  Need for prophylactic measure: Need for prophylactic measure  HTN (Hypertension), Benign: HTN (Hypertension), Benign  Glaucoma: Glaucoma  Dyslipidemia: Dyslipidemia  Gout attack: Gout attack  Atrial fibrillation with RVR: Atrial fibrillation with RVR        MEDICATIONS  (STANDING):  apixaban 5 milliGRAM(s) Oral every 12 hours  atorvastatin 10 milliGRAM(s) Oral at bedtime  calcitriol   Capsule 0.25 MICROGram(s) Oral daily  clopidogrel Tablet 75 milliGRAM(s) Oral daily  docusate sodium 100 milliGRAM(s) Oral three times a day  furosemide   Injectable 80 milliGRAM(s) IV Push daily  latanoprost 0.005% Ophthalmic Solution 1 Drop(s) Both EYES at bedtime  metoprolol succinate ER 50 milliGRAM(s) Oral daily  nicotine -   7 mG/24Hr(s) Patch 1 Patch Transdermal daily  pantoprazole    Tablet 40 milliGRAM(s) Oral before breakfast  senna 2 Tablet(s) Oral at bedtime    MEDICATIONS  (PRN):  nicotine  Polacrilex Gum 2 milliGRAM(s) Oral every 2 hours PRN breakthrough cravings  oxyCODONE    5 mG/acetaminophen 325 mG 1 Tablet(s) Oral every 6 hours PRN Severe Pain (7 - 10)  traMADol 50 milliGRAM(s) Oral every 6 hours PRN mild and moderate pain    Vital Signs Last 24 Hrs  T(C): 37 (07 Apr 2018 04:30), Max: 37 (07 Apr 2018 04:30)  T(F): 98.6 (07 Apr 2018 04:30), Max: 98.6 (07 Apr 2018 04:30)  HR: 74 (07 Apr 2018 04:30) (72 - 91)  BP: 106/63 (07 Apr 2018 04:30) (101/67 - 114/78)  BP(mean): --  RR: 18 (07 Apr 2018 04:30) (17 - 18)  SpO2: 100% (07 Apr 2018 04:30) (97% - 100%)  Daily     Daily     PHYSICAL EXAM:  Constitutional: He  appears comfortable and not distressed. Not diaphoretic.    Neck:  The thyroid is normal. Trachea is midline.     Breasts: Normal examination.    Respiratory: The lungs are clear to auscultation. No dullness and expansion is normal.    Cardiovascular: S1 and S2 are normal. No mummurs, rubs or gallops are present.    Gastrointestinal: The abdomen is soft. No tenderness is present. No masses are present. Bowel sounds are normal.    Genitourinary: The bladder is not distended. No CVA tenderness is present.    Extremities: 2+ edema is noted. Scar to right lower leg.    Neurological: Cognition is normal. Tone, power and sensation are normal. Gait is steady.    Skin: No leasions are seen  or palpated.    Lymph Nodes: No lymphadenopathy is present.    Psychiatric: Mood is appropriate. No hallucinations or flight of ideas are noted.                              11.2   6.93  )-----------( 211      ( 07 Apr 2018 05:42 )             37.9     04-07    137  |  93<L>  |  69<H>  ----------------------------<  103<H>  4.1   |  30  |  1.84<H>    Ca    9.9      07 Apr 2018 05:42  Phos  4.4     04-06  Mg     2.1     04-07

## 2018-04-07 NOTE — PROGRESS NOTE ADULT - SUBJECTIVE AND OBJECTIVE BOX
Patient is a 72y old  Male who presents with a chief complaint of left foot pain and new rapid afib (01 Apr 2018 14:04)  pt seen and examined at bedside   SUBJECTIVE/OVERNIGHT events noted, feels well  denies any foot pain, in NSR    Vital Signs Last 24 Hrs  T(C): 36.8 (07 Apr 2018 13:32), Max: 37 (07 Apr 2018 04:30)  T(F): 98.3 (07 Apr 2018 13:32), Max: 98.6 (07 Apr 2018 04:30)  HR: 82 (07 Apr 2018 13:32) (72 - 91)  BP: 116/76 (07 Apr 2018 13:32) (103/72 - 116/76)  BP(mean): --  RR: 18 (07 Apr 2018 13:32) (17 - 18)  SpO2: 98% (07 Apr 2018 13:32) (97% - 100%)  CAPILLARY BLOOD GLUCOSE        MEDICATIONS  (STANDING):  apixaban 5 milliGRAM(s) Oral every 12 hours  atorvastatin 10 milliGRAM(s) Oral at bedtime  calcitriol   Capsule 0.25 MICROGram(s) Oral daily  clopidogrel Tablet 75 milliGRAM(s) Oral daily  docusate sodium 100 milliGRAM(s) Oral three times a day  furosemide   Injectable 80 milliGRAM(s) IV Push daily  latanoprost 0.005% Ophthalmic Solution 1 Drop(s) Both EYES at bedtime  metoprolol succinate ER 50 milliGRAM(s) Oral daily  nicotine -   7 mG/24Hr(s) Patch 1 Patch Transdermal daily  pantoprazole    Tablet 40 milliGRAM(s) Oral before breakfast  senna 2 Tablet(s) Oral at bedtime    MEDICATIONS  (PRN):  nicotine  Polacrilex Gum 2 milliGRAM(s) Oral every 2 hours PRN breakthrough cravings  oxyCODONE    5 mG/acetaminophen 325 mG 1 Tablet(s) Oral every 6 hours PRN Severe Pain (7 - 10)  traMADol 50 milliGRAM(s) Oral every 6 hours PRN mild and moderate pain    PHYSICAL EXAM  General : comfortable, not in any acute distress  Neck : supple, no LAD, no JVD  Eyes : EOMI, PERRLA, conjunctiva : no icterus, no pallor  MM moist, no pharyngeal erythema/exudates  Pulmonary: clear to auscultation bilateral lung fields, no crackles/rhonchi/wheezing,   CVS : S1 S2,rate normal-,rhythm-regular, no murmurs, no abnormal sounds  Gastrointestinal: soft, non tender, non distended, no organomegaly , bowel sounds audible  Extremities: 1+le edema b/l  Neuro: AAOx3, no focal deficits  Musculoskeletal:  FROM of left foot , no pain  Skin: no rash  LABS                        11.2   6.93  )-----------( 211      ( 07 Apr 2018 05:42 )             37.9     04-07    137  |  93<L>  |  69<H>  ----------------------------<  103<H>  4.1   |  30  |  1.84<H>    Ca    9.9      07 Apr 2018 05:42  Phos  4.4     04-06  Mg     2.1     04-07        RADIOLOGY and IMAGING reviewed: yes  Consultant notes reviewed : yes  Care discussed with Consultants/other providers :

## 2018-04-07 NOTE — PROGRESS NOTE ADULT - SUBJECTIVE AND OBJECTIVE BOX
Subjective: Patient seen and examined. No new events except as noted.   Denice denies chest pain or sob feels well  nephrology followup appreciated  no palliations or sob  MEDICATIONS:  MEDICATIONS  (STANDING):  apixaban 5 milliGRAM(s) Oral every 12 hours  atorvastatin 10 milliGRAM(s) Oral at bedtime  calcitriol   Capsule 0.25 MICROGram(s) Oral daily  clopidogrel Tablet 75 milliGRAM(s) Oral daily  docusate sodium 100 milliGRAM(s) Oral three times a day  furosemide   Injectable 80 milliGRAM(s) IV Push daily  latanoprost 0.005% Ophthalmic Solution 1 Drop(s) Both EYES at bedtime  metoprolol succinate ER 50 milliGRAM(s) Oral daily  nicotine -   7 mG/24Hr(s) Patch 1 Patch Transdermal daily  pantoprazole    Tablet 40 milliGRAM(s) Oral before breakfast  senna 2 Tablet(s) Oral at bedtime      PHYSICAL EXAM:  T(C): 36.8 (04-07-18 @ 13:32), Max: 37 (04-07-18 @ 04:30)  HR: 82 (04-07-18 @ 13:32) (72 - 91)  BP: 116/76 (04-07-18 @ 13:32) (103/72 - 116/76)  RR: 18 (04-07-18 @ 13:32) (17 - 18)  SpO2: 98% (04-07-18 @ 13:32) (97% - 100%)  Wt(kg): --  I&O's Summary    06 Apr 2018 07:01  -  07 Apr 2018 07:00  --------------------------------------------------------  IN: 640 mL / OUT: 950 mL / NET: -310 mL    07 Apr 2018 07:01  -  07 Apr 2018 15:27  --------------------------------------------------------  IN: 350 mL / OUT: 200 mL / NET: 150 mL          Appearance: Normal	  HEENT:   Normal oral mucosa, PERRL, EOMI	  Cardiovascular: Normal S1 S2, No JVD, No murmurs ,  Respiratory: Lungs decreased BS blaterally poor inspiratory effort	  Gastrointestinal:  Soft, Non-tender, + BS	  Skin: No rashes, No ecchymoses, No cyanosis, warm to touch  Musculoskeletal: Normal range of motion, normal strength  Psychiatry:  Mood & affect appropriate  Ext: 2+ ecema  Peripheral pulses palpable 2+ bilaterally      LABS:    CARDIAC MARKERS:                                11.2   6.93  )-----------( 211      ( 07 Apr 2018 05:42 )             37.9     04-07    137  |  93<L>  |  69<H>  ----------------------------<  103<H>  4.1   |  30  |  1.84<H>    Ca    9.9      07 Apr 2018 05:42  Phos  4.4     04-06  Mg     2.1     04-07      proBNP:   Lipid Profile:   HgA1c:   TSH:           TELEMETRY: 	    ECG:  	  RADIOLOGY:   DIAGNOSTIC TESTING:  [ ] Echocardiogram:  [ ]  Catheterization:  [ ] Stress Test:    OTHER:

## 2018-04-08 LAB
BUN SERPL-MCNC: 52 MG/DL — HIGH (ref 7–23)
CALCIUM SERPL-MCNC: 9.8 MG/DL — SIGNIFICANT CHANGE UP (ref 8.4–10.5)
CHLORIDE SERPL-SCNC: 96 MMOL/L — LOW (ref 98–107)
CO2 SERPL-SCNC: 33 MMOL/L — HIGH (ref 22–31)
CREAT SERPL-MCNC: 1.47 MG/DL — HIGH (ref 0.5–1.3)
GLUCOSE SERPL-MCNC: 109 MG/DL — HIGH (ref 70–99)
HCT VFR BLD CALC: 39 % — SIGNIFICANT CHANGE UP (ref 39–50)
HGB BLD-MCNC: 11.4 G/DL — LOW (ref 13–17)
MAGNESIUM SERPL-MCNC: 2 MG/DL — SIGNIFICANT CHANGE UP (ref 1.6–2.6)
MCHC RBC-ENTMCNC: 22.1 PG — LOW (ref 27–34)
MCHC RBC-ENTMCNC: 29.2 % — LOW (ref 32–36)
MCV RBC AUTO: 75.4 FL — LOW (ref 80–100)
NRBC # FLD: 0.02 — SIGNIFICANT CHANGE UP
PLATELET # BLD AUTO: 193 K/UL — SIGNIFICANT CHANGE UP (ref 150–400)
PMV BLD: SIGNIFICANT CHANGE UP FL (ref 7–13)
POTASSIUM SERPL-MCNC: 4 MMOL/L — SIGNIFICANT CHANGE UP (ref 3.5–5.3)
POTASSIUM SERPL-SCNC: 4 MMOL/L — SIGNIFICANT CHANGE UP (ref 3.5–5.3)
RBC # BLD: 5.17 M/UL — SIGNIFICANT CHANGE UP (ref 4.2–5.8)
RBC # FLD: 21.1 % — HIGH (ref 10.3–14.5)
SODIUM SERPL-SCNC: 139 MMOL/L — SIGNIFICANT CHANGE UP (ref 135–145)
WBC # BLD: 8.02 K/UL — SIGNIFICANT CHANGE UP (ref 3.8–10.5)
WBC # FLD AUTO: 8.02 K/UL — SIGNIFICANT CHANGE UP (ref 3.8–10.5)

## 2018-04-08 RX ORDER — FUROSEMIDE 40 MG
40 TABLET ORAL AT BEDTIME
Qty: 0 | Refills: 0 | Status: DISCONTINUED | OUTPATIENT
Start: 2018-04-08 | End: 2018-04-09

## 2018-04-08 RX ORDER — FUROSEMIDE 40 MG
80 TABLET ORAL DAILY
Qty: 0 | Refills: 0 | Status: DISCONTINUED | OUTPATIENT
Start: 2018-04-09 | End: 2018-04-09

## 2018-04-08 RX ADMIN — APIXABAN 5 MILLIGRAM(S): 2.5 TABLET, FILM COATED ORAL at 06:33

## 2018-04-08 RX ADMIN — Medication 80 MILLIGRAM(S): at 06:33

## 2018-04-08 RX ADMIN — CALCITRIOL 0.25 MICROGRAM(S): 0.5 CAPSULE ORAL at 14:41

## 2018-04-08 RX ADMIN — Medication 1 PATCH: at 17:03

## 2018-04-08 RX ADMIN — PANTOPRAZOLE SODIUM 40 MILLIGRAM(S): 20 TABLET, DELAYED RELEASE ORAL at 06:32

## 2018-04-08 RX ADMIN — LATANOPROST 1 DROP(S): 0.05 SOLUTION/ DROPS OPHTHALMIC; TOPICAL at 21:54

## 2018-04-08 RX ADMIN — CLOPIDOGREL BISULFATE 75 MILLIGRAM(S): 75 TABLET, FILM COATED ORAL at 14:41

## 2018-04-08 RX ADMIN — Medication 50 MILLIGRAM(S): at 06:33

## 2018-04-08 RX ADMIN — Medication 1 PATCH: at 16:54

## 2018-04-08 RX ADMIN — Medication 40 MILLIGRAM(S): at 21:54

## 2018-04-08 RX ADMIN — APIXABAN 5 MILLIGRAM(S): 2.5 TABLET, FILM COATED ORAL at 17:11

## 2018-04-08 RX ADMIN — ATORVASTATIN CALCIUM 10 MILLIGRAM(S): 80 TABLET, FILM COATED ORAL at 21:54

## 2018-04-08 NOTE — PROGRESS NOTE ADULT - SUBJECTIVE AND OBJECTIVE BOX
Patient is a 72y old  Male who presents with a chief complaint of left foot pain and new rapid afib (01 Apr 2018 14:04)  pt seen and examined at bedside   SUBJECTIVE/OVERNIGHT events   Vital Signs Last 24 Hrs  T(C): 36.3 (08 Apr 2018 12:20), Max: 37.1 (07 Apr 2018 18:59)  T(F): 97.3 (08 Apr 2018 12:20), Max: 98.7 (07 Apr 2018 18:59)  HR: 78 (08 Apr 2018 12:20) (73 - 108)  BP: 113/77 (08 Apr 2018 12:20) (103/64 - 127/80)  BP(mean): --  RR: 20 (08 Apr 2018 12:20) (17 - 20)  SpO2: 93% (08 Apr 2018 12:20) (93% - 100%)  CAPILLARY BLOOD GLUCOSE        MEDICATIONS  (STANDING):  apixaban 5 milliGRAM(s) Oral every 12 hours  atorvastatin 10 milliGRAM(s) Oral at bedtime  calcitriol   Capsule 0.25 MICROGram(s) Oral daily  clopidogrel Tablet 75 milliGRAM(s) Oral daily  docusate sodium 100 milliGRAM(s) Oral three times a day  furosemide    Tablet 40 milliGRAM(s) Oral at bedtime  latanoprost 0.005% Ophthalmic Solution 1 Drop(s) Both EYES at bedtime  metoprolol succinate ER 50 milliGRAM(s) Oral daily  nicotine -   7 mG/24Hr(s) Patch 1 Patch Transdermal daily  pantoprazole    Tablet 40 milliGRAM(s) Oral before breakfast  senna 2 Tablet(s) Oral at bedtime    MEDICATIONS  (PRN):  nicotine  Polacrilex Gum 2 milliGRAM(s) Oral every 2 hours PRN breakthrough cravings  oxyCODONE    5 mG/acetaminophen 325 mG 1 Tablet(s) Oral every 6 hours PRN Severe Pain (7 - 10)  traMADol 50 milliGRAM(s) Oral every 6 hours PRN mild and moderate pain    PHYSICAL EXAM  General : comfortable, not in any acute distress  Neck : supple, no LAD, no JVD  Eyes : EOMI, PERRLA, conjunctiva : no icterus, no pallor  MM moist, no pharyngeal erythema/exudates  Pulmonary: clear to auscultation bilateral lung fields, no crackles/rhonchi/wheezing,   CVS : S1 S2,rate normal-,rhythm-regular, no murmurs, no abnormal sounds  Gastrointestinal: soft, non tender, non distended, no organomegaly , bowel sounds audible  Extremities: no edema  Neuro: AAOx3, no focal deficits  Musculoskeletal:  FROM of all ext  Skin: no rash  LABS                        11.4   8.02  )-----------( 193      ( 08 Apr 2018 06:15 )             39.0     04-08    139  |  96<L>  |  52<H>  ----------------------------<  109<H>  4.0   |  33<H>  |  1.47<H>    Ca    9.8      08 Apr 2018 06:15  Mg     2.0     04-08        RADIOLOGY and IMAGING reviewed: yes  Consultant notes reviewed : yes  Care discussed with Consultants/other providers :

## 2018-04-08 NOTE — PROGRESS NOTE ADULT - SUBJECTIVE AND OBJECTIVE BOX
Subjective: Patient seen and examined. No new events except as noted.   Offers no complaints of sob or cp  Feels well  creatinine continues to decrease    MEDICATIONS:  MEDICATIONS  (STANDING):  apixaban 5 milliGRAM(s) Oral every 12 hours  atorvastatin 10 milliGRAM(s) Oral at bedtime  calcitriol   Capsule 0.25 MICROGram(s) Oral daily  clopidogrel Tablet 75 milliGRAM(s) Oral daily  docusate sodium 100 milliGRAM(s) Oral three times a day  furosemide   Injectable 80 milliGRAM(s) IV Push daily  latanoprost 0.005% Ophthalmic Solution 1 Drop(s) Both EYES at bedtime  metoprolol succinate ER 50 milliGRAM(s) Oral daily  nicotine -   7 mG/24Hr(s) Patch 1 Patch Transdermal daily  pantoprazole    Tablet 40 milliGRAM(s) Oral before breakfast  senna 2 Tablet(s) Oral at bedtime      PHYSICAL EXAM:  T(C): 36.8 (04-08-18 @ 06:28), Max: 37.1 (04-07-18 @ 18:59)  HR: 73 (04-08-18 @ 06:28) (73 - 108)  BP: 103/64 (04-08-18 @ 06:28) (103/64 - 127/80)  RR: 18 (04-08-18 @ 06:28) (17 - 18)  SpO2: 100% (04-08-18 @ 06:28) (94% - 100%)  Wt(kg): --  I&O's Summary    07 Apr 2018 07:01  -  08 Apr 2018 07:00  --------------------------------------------------------  IN: 350 mL / OUT: 750 mL / NET: -400 mL          Appearance: Normal	  HEENT:   Normal oral mucosa, PERRL, EOMI	  Cardiovascular: Normal S1 S2, No JVD, No murmurs ,  Respiratory: Lungs dcreased BVS at bases few lft basilar ales clear with cough	  Gastrointestinal:  Soft, Non-tender, + BS	  Skin: No rashes, No ecchymoses, No cyanosis, warm to touch  Musculoskeletal: Normal range of motion, normal strength  Psychiatry:  Mood & affect appropriate  Ext: 2+ edema  Peripheral pulses palpable 2+ bilaterally      LABS:    CARDIAC MARKERS:                                11.4   8.02  )-----------( 193      ( 08 Apr 2018 06:15 )             39.0     04-08    139  |  96<L>  |  52<H>  ----------------------------<  109<H>  4.0   |  33<H>  |  1.47<H>    Ca    9.8      08 Apr 2018 06:15  Phos  4.4     04-06  Mg     2.0     04-08      proBNP:   Lipid Profile:   HgA1c:   TSH:           TELEMETRY: 	    ECG:  	  RADIOLOGY:   DIAGNOSTIC TESTING:  [ ] Echocardiogram:  [ ]  Catheterization:  [ ] Stress Test:    OTHER:

## 2018-04-08 NOTE — PROGRESS NOTE ADULT - SUBJECTIVE AND OBJECTIVE BOX
RHETT HOPSON  HPI:  This is a patient who was admitted for foot pains and was noted to be in Rapid Atrial Fibrillation. He was started on anticoagulation, and remains rate controlled.  He has CKD, HN and Gout by history.    HEALTH ISSUES - PROBLEM Dx:  Proteinuria: Proteinuria  Hyperkalemia: Hyperkalemia  Edema leg: Edema leg  Toe pain, left: Toe pain, left  MARY GRACE (acute kidney injury): MARY GRACE (acute kidney injury)  Acute renal failure superimposed on stage 1 chronic kidney disease, unspecified acute renal failure type: Acute renal failure superimposed on stage 1 chronic kidney disease, unspecified acute renal failure type  Need for prophylactic measure: Need for prophylactic measure  HTN (Hypertension), Benign: HTN (Hypertension), Benign  Glaucoma: Glaucoma  Dyslipidemia: Dyslipidemia  Gout attack: Gout attack  Atrial fibrillation with RVR: Atrial fibrillation with RVR        MEDICATIONS  (STANDING):  apixaban 5 milliGRAM(s) Oral every 12 hours  atorvastatin 10 milliGRAM(s) Oral at bedtime  calcitriol   Capsule 0.25 MICROGram(s) Oral daily  clopidogrel Tablet 75 milliGRAM(s) Oral daily  docusate sodium 100 milliGRAM(s) Oral three times a day  furosemide   Injectable 80 milliGRAM(s) IV Push daily  latanoprost 0.005% Ophthalmic Solution 1 Drop(s) Both EYES at bedtime  metoprolol succinate ER 50 milliGRAM(s) Oral daily  nicotine -   7 mG/24Hr(s) Patch 1 Patch Transdermal daily  pantoprazole    Tablet 40 milliGRAM(s) Oral before breakfast  senna 2 Tablet(s) Oral at bedtime    MEDICATIONS  (PRN):  nicotine  Polacrilex Gum 2 milliGRAM(s) Oral every 2 hours PRN breakthrough cravings  oxyCODONE    5 mG/acetaminophen 325 mG 1 Tablet(s) Oral every 6 hours PRN Severe Pain (7 - 10)  traMADol 50 milliGRAM(s) Oral every 6 hours PRN mild and moderate pain    Vital Signs Last 24 Hrs  T(C): 36.8 (08 Apr 2018 06:28), Max: 37.1 (07 Apr 2018 18:59)  T(F): 98.3 (08 Apr 2018 06:28), Max: 98.7 (07 Apr 2018 18:59)  HR: 73 (08 Apr 2018 06:28) (73 - 108)  BP: 103/64 (08 Apr 2018 06:28) (103/64 - 127/80)  BP(mean): --  RR: 18 (08 Apr 2018 06:28) (17 - 18)  SpO2: 100% (08 Apr 2018 06:28) (94% - 100%)  Daily     Daily     PHYSICAL EXAM:  Constitutional: He  appears comfortable and not distressed. Not diaphoretic.    Neck:  The thyroid is normal. Trachea is midline.     Respiratory: The lungs are clear to auscultation. No dullness and expansion is normal.    Cardiovascular: S1 and S2 are normal. No mummurs, rubs or gallops are present.    Gastrointestinal: The abdomen is soft. No tenderness is present. No masses are present. Bowel sounds are normal.    Genitourinary: The bladder is not distended. No CVA tenderness is present.    Extremities: 1+ edema is noted. No deformities are present.    Neurological: Cognition is normal. Tone, power and sensation are normal. Gait is steady.    Skin: No leasions are seen  or palpated.    Lymph Nodes: No lymphadenopathy is present.    Psychiatric: Mood is appropriate. No hallucinations or flight of ideas are noted.                              11.4   8.02  )-----------( 193      ( 08 Apr 2018 06:15 )             39.0     04-08    139  |  96<L>  |  52<H>  ----------------------------<  109<H>  4.0   |  33<H>  |  1.47<H>    Ca    9.8      08 Apr 2018 06:15  Mg     2.0     04-08    Protein, Random Urine (04.04.18 @ 16:24)    Protein, Random Urine: < 6: NO REFERENCE RANGES HAVE BEEN ESTABLISHED. mg/dL    Culture - Urine (04.04.18 @ 17:12)    -  Amikacin: S <=16 RIVERA    -  Ampicillin: R >16 RIVERA    -  Ampicillin/Sulbactam: I 16/8 RIVERA    -  Aztreonam: S <=4 RIVERA    -  Cefazolin: S <=8 RIVERA    -  Cefepime: S <=4 RIVERA    -  Cefoxitin: S <=8 RIVERA    -  Ceftazidime: S <=1 RIVERA    -  Ceftriaxone: S <=1 RIVERA    -  Ciprofloxacin: R >2 RIVERA    -  Ertapenem: S <=1 RIVERA    -  Gentamicin: S <=4 RIVERA    -  Imipenem: S <=1 RIVERA    -  Levofloxacin: R >4 RIVERA    -  Meropenem: S <=1 RIVERA    -  Nitrofurantoin: S <=32 RIVERA    -  Piperacillin/Tazobactam: S <=16 RIVERA    -  Tigecycline: S <=2 RIVERA    -  Tobramycin: S <=4 RIVERA    -  Trimethoprim/Sulfamethoxazole: R >2/38 RIVERA    Culture - Urine:   Insufficient growth, culture re-incubated.    Culture - Urine:   COLONY COUNT: > = 100,000 CFU/ML    Specimen Source: URINE MIDSTREAM    Organism Identification: Escherichia coli    Organism: Escherichia coli    Method Type: MICROSCAN NEG URINE COMBO 61    < from: US Renal (04.03.18 @ 10:44) >    Right kidney:  10.4 x 5.7 x 5.7 cm. No renal mass, hydronephrosis or   calculi.    Left kidney:  9.4 x 5.3 x 4.6 cm. No hydronephrosis. Anterior cyst,   measuring 1.8 x 1.7 x 1.9 cm.      < end of copied text >

## 2018-04-09 ENCOUNTER — TRANSCRIPTION ENCOUNTER (OUTPATIENT)
Age: 73
End: 2018-04-09

## 2018-04-09 VITALS
RESPIRATION RATE: 18 BRPM | HEART RATE: 94 BPM | SYSTOLIC BLOOD PRESSURE: 106 MMHG | TEMPERATURE: 98 F | DIASTOLIC BLOOD PRESSURE: 71 MMHG | OXYGEN SATURATION: 97 %

## 2018-04-09 LAB
BUN SERPL-MCNC: 45 MG/DL — HIGH (ref 7–23)
BUN SERPL-MCNC: 45 MG/DL — HIGH (ref 7–23)
CALCIUM SERPL-MCNC: 10.1 MG/DL — SIGNIFICANT CHANGE UP (ref 8.4–10.5)
CALCIUM SERPL-MCNC: 10.1 MG/DL — SIGNIFICANT CHANGE UP (ref 8.4–10.5)
CHLORIDE SERPL-SCNC: 93 MMOL/L — LOW (ref 98–107)
CHLORIDE SERPL-SCNC: 93 MMOL/L — LOW (ref 98–107)
CO2 SERPL-SCNC: 38 MMOL/L — HIGH (ref 22–31)
CO2 SERPL-SCNC: 38 MMOL/L — HIGH (ref 22–31)
CREAT SERPL-MCNC: 1.55 MG/DL — HIGH (ref 0.5–1.3)
CREAT SERPL-MCNC: 1.55 MG/DL — HIGH (ref 0.5–1.3)
GLUCOSE SERPL-MCNC: 103 MG/DL — HIGH (ref 70–99)
GLUCOSE SERPL-MCNC: 103 MG/DL — HIGH (ref 70–99)
HCT VFR BLD CALC: 42.6 % — SIGNIFICANT CHANGE UP (ref 39–50)
HGB BLD-MCNC: 12.5 G/DL — LOW (ref 13–17)
MAGNESIUM SERPL-MCNC: 1.7 MG/DL — SIGNIFICANT CHANGE UP (ref 1.6–2.6)
MCHC RBC-ENTMCNC: 22.6 PG — LOW (ref 27–34)
MCHC RBC-ENTMCNC: 29.3 % — LOW (ref 32–36)
MCV RBC AUTO: 76.9 FL — LOW (ref 80–100)
NRBC # FLD: 0 — SIGNIFICANT CHANGE UP
PLATELET # BLD AUTO: 212 K/UL — SIGNIFICANT CHANGE UP (ref 150–400)
PMV BLD: SIGNIFICANT CHANGE UP FL (ref 7–13)
POTASSIUM SERPL-MCNC: 3.5 MMOL/L — SIGNIFICANT CHANGE UP (ref 3.5–5.3)
POTASSIUM SERPL-MCNC: 3.5 MMOL/L — SIGNIFICANT CHANGE UP (ref 3.5–5.3)
POTASSIUM SERPL-SCNC: 3.5 MMOL/L — SIGNIFICANT CHANGE UP (ref 3.5–5.3)
POTASSIUM SERPL-SCNC: 3.5 MMOL/L — SIGNIFICANT CHANGE UP (ref 3.5–5.3)
RBC # BLD: 5.54 M/UL — SIGNIFICANT CHANGE UP (ref 4.2–5.8)
RBC # FLD: 21.9 % — HIGH (ref 10.3–14.5)
SODIUM SERPL-SCNC: 143 MMOL/L — SIGNIFICANT CHANGE UP (ref 135–145)
SODIUM SERPL-SCNC: 143 MMOL/L — SIGNIFICANT CHANGE UP (ref 135–145)
WBC # BLD: 8.91 K/UL — SIGNIFICANT CHANGE UP (ref 3.8–10.5)
WBC # FLD AUTO: 8.91 K/UL — SIGNIFICANT CHANGE UP (ref 3.8–10.5)

## 2018-04-09 RX ORDER — FUROSEMIDE 40 MG
1 TABLET ORAL
Qty: 0 | Refills: 0 | COMMUNITY
Start: 2018-04-09 | End: 2018-05-08

## 2018-04-09 RX ORDER — FUROSEMIDE 40 MG
1 TABLET ORAL
Qty: 60 | Refills: 0 | OUTPATIENT
Start: 2018-04-09 | End: 2018-05-08

## 2018-04-09 RX ORDER — CALCITRIOL 0.5 UG/1
1 CAPSULE ORAL
Qty: 30 | Refills: 0 | OUTPATIENT
Start: 2018-04-09 | End: 2018-05-08

## 2018-04-09 RX ORDER — LATANOPROST 0.05 MG/ML
1 SOLUTION/ DROPS OPHTHALMIC; TOPICAL
Qty: 0 | Refills: 0 | COMMUNITY
Start: 2018-04-09

## 2018-04-09 RX ORDER — ALLOPURINOL 300 MG
1 TABLET ORAL
Qty: 30 | Refills: 0 | OUTPATIENT
Start: 2018-04-09 | End: 2018-05-08

## 2018-04-09 RX ORDER — METOPROLOL TARTRATE 50 MG
1 TABLET ORAL
Qty: 0 | Refills: 0 | COMMUNITY
Start: 2018-04-09

## 2018-04-09 RX ORDER — PANTOPRAZOLE SODIUM 20 MG/1
1 TABLET, DELAYED RELEASE ORAL
Qty: 0 | Refills: 0 | COMMUNITY
Start: 2018-04-09

## 2018-04-09 RX ORDER — NICOTINE POLACRILEX 2 MG
1 GUM BUCCAL
Qty: 30 | Refills: 0 | OUTPATIENT
Start: 2018-04-09 | End: 2018-05-08

## 2018-04-09 RX ORDER — DOCUSATE SODIUM 100 MG
1 CAPSULE ORAL
Qty: 0 | Refills: 0 | COMMUNITY
Start: 2018-04-09

## 2018-04-09 RX ORDER — ALLOPURINOL 300 MG
100 TABLET ORAL DAILY
Qty: 0 | Refills: 0 | Status: DISCONTINUED | OUTPATIENT
Start: 2018-04-09 | End: 2018-04-09

## 2018-04-09 RX ORDER — APIXABAN 2.5 MG/1
1 TABLET, FILM COATED ORAL
Qty: 60 | Refills: 0 | OUTPATIENT
Start: 2018-04-09 | End: 2018-05-08

## 2018-04-09 RX ORDER — CLOPIDOGREL BISULFATE 75 MG/1
1 TABLET, FILM COATED ORAL
Qty: 0 | Refills: 0 | COMMUNITY
Start: 2018-04-09

## 2018-04-09 RX ORDER — FUROSEMIDE 40 MG
40 TABLET ORAL
Qty: 0 | Refills: 0 | Status: DISCONTINUED | OUTPATIENT
Start: 2018-04-09 | End: 2018-04-09

## 2018-04-09 RX ORDER — NICOTINE POLACRILEX 2 MG
1 GUM BUCCAL
Qty: 360 | Refills: 0 | OUTPATIENT
Start: 2018-04-09 | End: 2018-05-08

## 2018-04-09 RX ORDER — SENNA PLUS 8.6 MG/1
2 TABLET ORAL
Qty: 0 | Refills: 0 | COMMUNITY
Start: 2018-04-09

## 2018-04-09 RX ADMIN — Medication 100 MILLIGRAM(S): at 14:51

## 2018-04-09 RX ADMIN — CALCITRIOL 0.25 MICROGRAM(S): 0.5 CAPSULE ORAL at 14:51

## 2018-04-09 RX ADMIN — Medication 40 MILLIGRAM(S): at 17:11

## 2018-04-09 RX ADMIN — PANTOPRAZOLE SODIUM 40 MILLIGRAM(S): 20 TABLET, DELAYED RELEASE ORAL at 05:27

## 2018-04-09 RX ADMIN — APIXABAN 5 MILLIGRAM(S): 2.5 TABLET, FILM COATED ORAL at 17:13

## 2018-04-09 RX ADMIN — Medication 50 MILLIGRAM(S): at 05:27

## 2018-04-09 RX ADMIN — Medication 80 MILLIGRAM(S): at 05:28

## 2018-04-09 RX ADMIN — APIXABAN 5 MILLIGRAM(S): 2.5 TABLET, FILM COATED ORAL at 05:27

## 2018-04-09 RX ADMIN — CLOPIDOGREL BISULFATE 75 MILLIGRAM(S): 75 TABLET, FILM COATED ORAL at 14:51

## 2018-04-09 RX ADMIN — Medication 1 PATCH: at 14:50

## 2018-04-09 NOTE — DISCHARGE NOTE ADULT - MEDICATION SUMMARY - MEDICATIONS TO STOP TAKING
I will STOP taking the medications listed below when I get home from the hospital:    Lotrel 5 mg-40 mg oral capsule  -- 1 cap(s) by mouth once a day in morning

## 2018-04-09 NOTE — DISCHARGE NOTE ADULT - PLAN OF CARE
No further gout attacks Avoid spicy foods, stay hydrated, seek medical attention for pain management, continue Allopurinol Continue blood pressure, cholesterol and diabetic medications. Goal of hemoglobin A1C (HgbA1C) < 7%.  Avoid nephrotoxic drugs such as nonsteroidal anti-inflammatory agents (NSAIDs).   Please follow up with your nephrologist to monitor your kidney function, continue with low protein and potassium diet. Please take your medications as prescribed.  Continue to take your blood thinner as prescribed and follow with your physician to monitor your levels.  Low fat diet, reduce caffeine intake, and exercise at least 30 minutes daily. Seek medical attention for blood bowel movement or rectal pain Seek medical attention for painful urination, lower back pain, fevers

## 2018-04-09 NOTE — DISCHARGE NOTE ADULT - CARE PROVIDER_API CALL
Alejo Vazquez (ZORAN), Internal Medicine; Nephrology  [address]  [address] [place] [address]  Phone: [phone]  Fax: [phone]    iDanna King (), Internal Medicine  [address] [place] [address]  Phone: [phone]  Fax: [phone]    Jayesh Castañeda), Cardiovascular Disease; Internal Medicine; Interventional Cardiology  [address] [facility]  [address] [place] [address]  Phone: [phone]  Fax: [phone] Alejo Vazquez (ZORAN), Internal Medicine; Nephrology  [address]  [address] [place] [address]  Phone: [phone]  Fax: [phone]    Jayesh Castañeda), Cardiovascular Disease; Internal Medicine; Interventional Cardiology  [address] [facility]  [address] [place] [address]  Phone: [phone]  Fax: [phone]    Lluvia Solis), Internal Medicine  [address] [place] [address]  Phone: [phone]  Fax: [phone]

## 2018-04-09 NOTE — PROGRESS NOTE ADULT - PROBLEM SELECTOR PROBLEM 7
Need for prophylactic measure
Proteinuria
Need for prophylactic measure

## 2018-04-09 NOTE — PROGRESS NOTE ADULT - PROBLEM SELECTOR PROBLEM 2
Acute renal failure superimposed on stage 1 chronic kidney disease, unspecified acute renal failure type
HTN (Hypertension), Benign
Acute renal failure superimposed on stage 1 chronic kidney disease, unspecified acute renal failure type

## 2018-04-09 NOTE — PROGRESS NOTE ADULT - SUBJECTIVE AND OBJECTIVE BOX
Dr. Vazquez (Nephrology)  Office [phone]  Cell [phone]  Berk[facility] MARTINEZ  Cell [phone]      Patient is a 72y old  Male who presents with a chief complaint of left foot pain and new rapid afib (01 Apr 2018 14:04)      Patient seen and examined at bedside. No chest pain/sob    VITALS:  T(F): 97.3 (04-09-18 @ 10:45), Max: 98.6 (04-08-18 @ 20:13)  HR: 86 (04-09-18 @ 10:45)  BP: 96/68 (04-09-18 @ 10:45)  RR: 17 (04-09-18 @ 10:45)  SpO2: 94% (04-09-18 @ 10:45)  Wt(kg): --    04-08 @ 07:01  -  04-09 @ 07:00  --------------------------------------------------------  IN: 0 mL / OUT: 800 mL / NET: -800 mL          PHYSICAL EXAM:  Constitutional: NAD  Neck: No JVD  Respiratory: decrease BS at base   Cardiovascular: S1, S2, RRR  Gastrointestinal: BS+, soft, NT/ND  Extremities: 2+ peripheral edema    Hospital Medications:   MEDICATIONS  (STANDING):  apixaban 5 milliGRAM(s) Oral every 12 hours  atorvastatin 10 milliGRAM(s) Oral at bedtime  calcitriol   Capsule 0.25 MICROGram(s) Oral daily  clopidogrel Tablet 75 milliGRAM(s) Oral daily  docusate sodium 100 milliGRAM(s) Oral three times a day  furosemide    Tablet 80 milliGRAM(s) Oral daily  furosemide    Tablet 40 milliGRAM(s) Oral at bedtime  latanoprost 0.005% Ophthalmic Solution 1 Drop(s) Both EYES at bedtime  metoprolol succinate ER 50 milliGRAM(s) Oral daily  nicotine -   7 mG/24Hr(s) Patch 1 Patch Transdermal daily  pantoprazole    Tablet 40 milliGRAM(s) Oral before breakfast  senna 2 Tablet(s) Oral at bedtime      LABS:  04-09    143  |  93<L>  |  45<H>  ----------------------------<  103<H>  3.5   |  38<H>  |  1.55<H>    Ca    10.1      09 Apr 2018 06:25  Mg     1.7     04-09      Creatinine Trend: 1.55 <--, 1.47 <--, 1.84 <--, 2.08 <--, 2.02 <--, 2.16 <--, 2.44 <--, 2.59 <--, 2.63 <--                                12.5   8.91  )-----------( 212      ( 09 Apr 2018 06:25 )             42.6     Urine Studies:  Urinalysis - [04-02-18 @ 21:16]      Color YELLOW / Appearance HAZY / SG 1.030 / pH 5.0      Gluc NEGATIVE / Ketone NEGATIVE  / Bili NEGATIVE / Urobili NORMAL       Blood NEGATIVE / Protein 100 / Leuk Est NEGATIVE / Nitrite NEGATIVE      RBC 2-5 / WBC 10-25 / Hyaline >50 / Gran  / Sq Epi OCC / Non Sq Epi  / Bacteria MANY    Urine Creatinine 14.39      [04-04-18 @ 16:24]  Urine Protein < 6      [04-04-18 @ 16:24]  Urine Sodium 32      [04-02-18 @ 21:16]    Iron 13, TIBC 354, %sat --      [04-05-18 @ 18:42]  Ferritin 33.85      [04-05-18 @ 18:42]  .0 (Ca --)      [04-05-18 @ 18:42]   --  PTH 70.45 (Ca --)      [04-03-18 @ 05:46]   --  Vitamin D (25OH) 49.5      [04-05-18 @ 18:42]  TSH 3.46      [04-01-18 @ 09:32]        RADIOLOGY & ADDITIONAL STUDIES:

## 2018-04-09 NOTE — PROGRESS NOTE ADULT - SUBJECTIVE AND OBJECTIVE BOX
INTERVAL HISTORY:  Patient seen and examined. Appears comfortable. Irving any CP, SOB, dizziness, LH, palps or near syncope or syncope.   Found sitting in bed. States he is tired.   	  MEDICATIONS:  apixaban 5 milliGRAM(s) Oral every 12 hours  clopidogrel Tablet 75 milliGRAM(s) Oral daily  furosemide    Tablet 40 milliGRAM(s) Oral two times a day  metoprolol succinate ER 50 milliGRAM(s) Oral daily  docusate sodium 100 milliGRAM(s) Oral three times a day  pantoprazole    Tablet 40 milliGRAM(s) Oral before breakfast  senna 2 Tablet(s) Oral at bedtime  allopurinol 100 milliGRAM(s) Oral daily  atorvastatin 10 milliGRAM(s) Oral at bedtime  calcitriol   Capsule 0.25 MICROGram(s) Oral daily  latanoprost 0.005% Ophthalmic Solution 1 Drop(s) Both EYES at bedtime      PHYSICAL EXAM:  T(C): 36.3 (04-09-18 @ 10:45), Max: 37 (04-08-18 @ 20:13)  HR: 86 (04-09-18 @ 10:45) (82 - 90)  BP: 96/68 (04-09-18 @ 10:45) (96/68 - 114/81)  RR: 17 (04-09-18 @ 10:45) (16 - 18)  SpO2: 94% (04-09-18 @ 10:45) (92% - 99%)  Wt(kg): --  I&O's Summary    08 Apr 2018 07:01  -  09 Apr 2018 07:00  --------------------------------------------------------  IN: 0 mL / OUT: 800 mL / NET: -800 mL    Appearance: Normal  Neuro : alert and oriented   HEENT:   Normal oral mucosa, PERRL, EOMI	  Lymphatic: No lymphadenopathy  Cardiovascular: Normal S1 S2, No JVD, No murmurs, No edema  Respiratory: Lungs clear to auscultation	  Psychiatry: A & O x 3, Mood & affect appropriate  Gastrointestinal:  Soft, Non-tender, + BS	  Skin: No rashes, No ecchymoses, No cyanosis  Neurologic: Non-focal  Extremities: Normal range of motion, No clubbing, cyanosis or edema  Vascular: Peripheral pulses palpable 2+ bilaterally  SKin :     TELEMETRY: 	SInus rhythm 80's       LABS:	 	    CARDIAC MARKERS:                                  12.5   8.91  )-----------( 212      ( 09 Apr 2018 06:25 )             42.6     04-09    143  |  93<L>  |  45<H>  ----------------------------<  103<H>  3.5   |  38<H>  |  1.55<H>    Ca    10.1      09 Apr 2018 06:25  Mg     1.7     04-09      ASSESSMENT/PLAN: 	  71 yo male with past medical history of CAD s/p stent x1, PVD with right fem-pop bypass, HTN, HLD, CKD, prostate cancer s/p prostatectomy and gout admitted with ADHF in the setting of rapid atrial tachycardia.  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 Centeno 4/20/18 @ 1 pm.   Maintain K > 4.0 and Mg > 1.8    Thank you.     Aubree Kendrick, [facility]  [id number]

## 2018-04-09 NOTE — PROGRESS NOTE ADULT - PROBLEM SELECTOR PLAN 3
completed prednisone 40mg PO daily x 5 days  Low purine diet, no alcohol advised.  Caution colchicine with MARY GRACE.     Left first and second toe pain: gout vs. neuropathic pain (tender to light touch)  Recent discharge from [facility] after giving a dose of colchicine. Podiatry eval appreciated.  (uric acid 9.9 last week, today was within normal limit).   If no improvement will consider Rheumatology.   Ortho saw him for knee effusions, no surgical intervention.  Pain control with Tramadol PRN.  Physical therapy.    Pt is also a vasculopathic with right sided LE stents. (outpt Vascular DrLiset Figueroa). Recent MERCED was not significant.   Cardiology outpt is Dr. Lam () in [place]. Remote history of cardiac stent. On Plavix. (will hold aspirin. restart Plavix). c/w Hep drip/coumadin.
Started on prednisone 40mg PO daily x 5 days  Low purine diet, no alcohol advised.    Left first and second toe pain: gout vs. neuropathic pain (tender to light touch)  Recent discharge from [facility] after giving a dose of colchicine. Podiatry eval appreciated.    Pt s/p colchicine in the ED. given CKD, will give a trial of prednisone 40 mg x 5 days course for gout.   (uric acid 9.9 last week, today was within normal limit).   If no improvement will consider Rheumatology.   Ortho saw him for knee effusions, no surgical intervention.  Pain control with Tramadol PRN.  Physical therapy.  Pt is also a vasculopathic with right sided LE stents. (outpt Vascular Dr. Artem Figueroa). Recent MERCED was not significant.   Cardiology outpt is Dr. Lam () in [place]. Message left. Remote history of cardiac stent.  In the ED, intense foot pain, xrays neg for fracture. Complicated by a.fib/flutter with RVR, no known history.
To complete prednisone 40mg PO daily x 5 days  Low purine diet, no alcohol advised.  Caution colchicine with MARY GRACE.     Left first and second toe pain: gout vs. neuropathic pain (tender to light touch)  Recent discharge from [facility] after giving a dose of colchicine. Podiatry eval appreciated.    Pt s/p colchicine in the ED. given CKD, will give a trial of prednisone 40 mg x 5 days course for gout.   (uric acid 9.9 last week, today was within normal limit).   If no improvement will consider Rheumatology.   Ortho saw him for knee effusions, no surgical intervention.  Pain control with Tramadol PRN.  Physical therapy.    Pt is also a vasculopathic with right sided LE stents. (outpt Vascular Dr. Artem Figueroa). Recent MERCED was not significant.   Cardiology outpt is Dr. Lam () in [place]. Remote history of cardiac stent. On Plavix. (will hold aspirin. restart Plavix). c/w Hep drip/coumadin.
To complete prednisone 40mg PO daily x 5 days  Low purine diet, no alcohol advised.  Caution colchicine with MARY GRACE.     Left first and second toe pain: gout vs. neuropathic pain (tender to light touch)  Recent discharge from [facility] after giving a dose of colchicine. Podiatry eval appreciated.    Pt s/p colchicine in the ED. given CKD, will give a trial of prednisone 40 mg x 5 days course for gout.   (uric acid 9.9 last week, today was within normal limit).   If no improvement will consider Rheumatology.   Ortho saw him for knee effusions, no surgical intervention.  Pain control with Tramadol PRN.  Physical therapy.    Pt is also a vasculopathic with right sided LE stents. (outpt Vascular Dr. Artem Figueroa). Recent MERCED was not significant.   Cardiology outpt is Dr. Lam () in [place]. Remote history of cardiac stent. On Plavix. (will hold aspirin. restart Plavix). c/w Hep drip/coumadin.
completed prednisone 40mg PO daily x 5 days  Low purine diet, no alcohol advised.  Caution colchicine with MARY GRACE.     Left first and second toe pain: gout vs. neuropathic pain (tender to light touch)  Recent discharge from [facility] after giving a dose of colchicine. Podiatry eval appreciated.  (uric acid 9.9 last week, today was within normal limit).   If no improvement will consider Rheumatology.   Ortho saw him for knee effusions, no surgical intervention.  Pain control with Tramadol PRN.  Physical therapy.    Pt is also a vasculopathic with right sided LE stents. (outpt Vascular DrLiset Figueroa). Recent MERCED was not significant.   Cardiology outpt is Dr. Lam () in [place]. Remote history of cardiac stent. On Plavix. (will hold aspirin. restart Plavix). c/w Hep drip/coumadin.
completed prednisone 40mg PO daily x 5 days  Low purine diet, no alcohol advised.  Caution colchicine with MARY GRACE.   Started on Allopurinol 100 mg qdaily for gout prophylaxis.   Left first and second toe pain: gout vs. neuropathic pain (tender to light touch)  Recent discharge from [facility] after giving a dose of colchicine. Podiatry eval appreciated.  (uric acid 9.9 last week, today was within normal limit).   If no improvement will consider Rheumatology.   Ortho saw him for knee effusions, no surgical intervention.  Pain control with Tramadol PRN.  Physical therapy.    Pt is also a vasculopathic with right sided LE stents. (outpt Vascular Dr. Artem Figueroa). Recent MERCED was not significant.   Cardiology outpt is Dr. Lam () in [place]. Remote history of cardiac stent. On Plavix. (will hold aspirin. restart Plavix). c/w Hep drip/coumadin.
f/u cardio
improved  low k diet  monitor
kayexalate 30g x 1 now  lasix as ordered  low k diet  monitor
completed prednisone 40mg PO daily x 5 days  Low purine diet, no alcohol advised.  Caution colchicine with MARY GRACE.     Left first and second toe pain: gout vs. neuropathic pain (tender to light touch)  Recent discharge from [facility] after giving a dose of colchicine. Podiatry eval appreciated.  (uric acid 9.9 last week, today was within normal limit).   If no improvement will consider Rheumatology.   Ortho saw him for knee effusions, no surgical intervention.  Pain control with Tramadol PRN.  Physical therapy.    Pt is also a vasculopathic with right sided LE stents. (outpt Vascular DrLiset Figueroa). Recent MERCED was not significant.   Cardiology outpt is Dr. Lam () in [place]. Remote history of cardiac stent. On Plavix. (will hold aspirin. restart Plavix). c/w Hep drip/coumadin.

## 2018-04-09 NOTE — PROGRESS NOTE ADULT - PROBLEM SELECTOR PROBLEM 1
Atrial fibrillation with RVR
MARY GRACE (acute kidney injury)
Atrial fibrillation with RVR
Atrial fibrillation with RVR

## 2018-04-09 NOTE — PROGRESS NOTE ADULT - SUBJECTIVE AND OBJECTIVE BOX
Patient is a 72y old  Male who presents with a chief complaint of left foot pain and new rapid afib (01 Apr 2018 14:04)      SUBJECTIVE / OVERNIGHT EVENTS:  feels well.  the daughter at bedside.  able to walk.  he wants to go home.  no cp, no sob, no n/v/d. no abdominal pain.  no headache, no dizziness.   no event on tele. Sinus.   Denied toe pain.       Vital Signs Last 24 Hrs  T(C): 36.3 (09 Apr 2018 10:45), Max: 37 (08 Apr 2018 20:13)  T(F): 97.3 (09 Apr 2018 10:45), Max: 98.6 (08 Apr 2018 20:13)  HR: 86 (09 Apr 2018 10:45) (82 - 90)  BP: 96/68 (09 Apr 2018 10:45) (96/68 - 114/81)  BP(mean): --  RR: 17 (09 Apr 2018 10:45) (16 - 18)  SpO2: 94% (09 Apr 2018 10:45) (92% - 99%)  I&O's Summary    08 Apr 2018 07:01  -  09 Apr 2018 07:00  --------------------------------------------------------  IN: 0 mL / OUT: 800 mL / NET: -800 mL      PHYSICAL EXAM:  GENERAL: NAD, Comfortable, elderly with stutter and slow speech baseline  HEAD:  Atraumatic, Normocephalic  EYES: EOMI, PERRLA, conjunctiva and sclera clear  NECK: Supple, No JVD  CHEST/LUNG: Clear to auscultation bilaterally; No wheeze  HEART: Regular rate and rhythm; No murmurs, rubs, or gallops  ABDOMEN: Soft, Nontender, Nondistended; Bowel sounds present  Neuro: AAO x 3, no focal deficit, 5/5 b/l extremities  EXTREMITIES:  2+ Peripheral Pulses, No clubbing, cyanosis. Trance edema b/l foot. improved.   SKIN: No rashes or lesions      LABS:                        12.5   8.91  )-----------( 212      ( 09 Apr 2018 06:25 )             42.6     04-09    143  |  93<L>  |  45<H>  ----------------------------<  103<H>  3.5   |  38<H>  |  1.55<H>    Ca    10.1      09 Apr 2018 06:25  Mg     1.7     04-09        CAPILLARY BLOOD GLUCOSE                RADIOLOGY & ADDITIONAL TESTS:    Imaging Personally Reviewed:  [x] YES  [ ] NO    Consultant(s) Notes Reviewed:  [x] YES  [ ] NO      MEDICATIONS  (STANDING):  allopurinol 100 milliGRAM(s) Oral daily  apixaban 5 milliGRAM(s) Oral every 12 hours  atorvastatin 10 milliGRAM(s) Oral at bedtime  calcitriol   Capsule 0.25 MICROGram(s) Oral daily  clopidogrel Tablet 75 milliGRAM(s) Oral daily  docusate sodium 100 milliGRAM(s) Oral three times a day  furosemide    Tablet 40 milliGRAM(s) Oral two times a day  latanoprost 0.005% Ophthalmic Solution 1 Drop(s) Both EYES at bedtime  metoprolol succinate ER 50 milliGRAM(s) Oral daily  nicotine -   7 mG/24Hr(s) Patch 1 Patch Transdermal daily  pantoprazole    Tablet 40 milliGRAM(s) Oral before breakfast  senna 2 Tablet(s) Oral at bedtime    MEDICATIONS  (PRN):  nicotine  Polacrilex Gum 2 milliGRAM(s) Oral every 2 hours PRN breakthrough cravings      Care Discussed with Consultants/Other Providers [x] YES  [ ] NO    HEALTH ISSUES - PROBLEM Dx:  Proteinuria: Proteinuria  Hyperkalemia: Hyperkalemia  Edema leg: Edema leg  Toe pain, left: Toe pain, left  MARY GRACE (acute kidney injury): MARY GRACE (acute kidney injury)  Acute renal failure superimposed on stage 1 chronic kidney disease, unspecified acute renal failure type: Acute renal failure superimposed on stage 1 chronic kidney disease, unspecified acute renal failure type  Need for prophylactic measure: Need for prophylactic measure  HTN (Hypertension), Benign: HTN (Hypertension), Benign  Glaucoma: Glaucoma  Dyslipidemia: Dyslipidemia  Gout attack: Gout attack  Atrial fibrillation with RVR: Atrial fibrillation with RVR

## 2018-04-09 NOTE — DISCHARGE NOTE ADULT - SECONDARY DIAGNOSIS.
Acute renal failure superimposed on stage 1 chronic kidney disease, unspecified acute renal failure type Atrial fibrillation with RVR Hemorrhoids Bacteriuria

## 2018-04-09 NOTE — PROGRESS NOTE ADULT - PROBLEM/PLAN-3
DISPLAY PLAN FREE TEXT
Statement Selected

## 2018-04-09 NOTE — PROGRESS NOTE ADULT - PROBLEM SELECTOR PLAN 1
resolved. s/p NATALY with ablation.   EP eval appreciated.   cardiac enzymes x 2 neg  c/w metoprolol 50 mg bid  c/w Eliquis. Risk and benefits of AC explained and all questions answered at the present of his daughter at bedside.   TTE - mild global LV dysfunction with RV overload  cardiology/EP appreciated  outpt EP follow up.

## 2018-04-09 NOTE — PROGRESS NOTE ADULT - PROBLEM SELECTOR PROBLEM 6
HTN (Hypertension), Benign
Edema leg
HTN (Hypertension), Benign

## 2018-04-09 NOTE — DISCHARGE NOTE ADULT - NS AS ACTIVITY OBS
Showering allowed/Walking-Indoors allowed/Stairs allowed/Walking-Outdoors allowed/Do not drive or operate machinery/No Heavy lifting/straining

## 2018-04-09 NOTE — DISCHARGE NOTE ADULT - PROVIDER TOKENS
TOKEN:'[id number]:'[id number]:'[id number]' TOKEN:'[id number]:'[id number]:'[id number]'

## 2018-04-09 NOTE — DISCHARGE NOTE ADULT - CARE PLAN
Principal Discharge DX:	Gout  Goal:	No further gout attacks  Assessment and plan of treatment:	Avoid spicy foods, stay hydrated, seek medical attention for pain management, continue Allopurinol  Secondary Diagnosis:	Acute renal failure superimposed on stage 1 chronic kidney disease, unspecified acute renal failure type  Assessment and plan of treatment:	Continue blood pressure, cholesterol and diabetic medications. Goal of hemoglobin A1C (HgbA1C) < 7%.  Avoid nephrotoxic drugs such as nonsteroidal anti-inflammatory agents (NSAIDs).   Please follow up with your nephrologist to monitor your kidney function, continue with low protein and potassium diet.  Secondary Diagnosis:	Atrial fibrillation with RVR  Assessment and plan of treatment:	Please take your medications as prescribed.  Continue to take your blood thinner as prescribed and follow with your physician to monitor your levels.  Low fat diet, reduce caffeine intake, and exercise at least 30 minutes daily.  Secondary Diagnosis:	Hemorrhoids  Assessment and plan of treatment:	Seek medical attention for blood bowel movement or rectal pain  Secondary Diagnosis:	Bacteriuria  Assessment and plan of treatment:	Seek medical attention for painful urination, lower back pain, fevers

## 2018-04-09 NOTE — PROGRESS NOTE ADULT - PROBLEM SELECTOR PLAN 2
prerenal 2' CHF   s/p IV lasix 80 mg, now on Lasix 80 mg in am, 40 mg in pm.  Appreciate renal and cardiology input.   To switch to Lasix 40 mg BID on discharge.   Renal US without hydronephrosis

## 2018-04-09 NOTE — DISCHARGE NOTE ADULT - MEDICATION SUMMARY - MEDICATIONS TO CHANGE
I will SWITCH the dose or number of times a day I take the medications listed below when I get home from the hospital:    Metoprolol Succinate ER 25 mg oral tablet, extended release  -- 1 tab(s) by mouth once a day in morning

## 2018-04-09 NOTE — PROGRESS NOTE ADULT - SUBJECTIVE AND OBJECTIVE BOX
Subjective: Patient offers no new complaints   No cp or sob   remains NSR  renal function improved  HCO3 38 ?alkalotic  MEDICATIONS:  MEDICATIONS  (STANDING):  apixaban 5 milliGRAM(s) Oral every 12 hours  atorvastatin 10 milliGRAM(s) Oral at bedtime  calcitriol   Capsule 0.25 MICROGram(s) Oral daily  clopidogrel Tablet 75 milliGRAM(s) Oral daily  docusate sodium 100 milliGRAM(s) Oral three times a day  furosemide    Tablet 80 milliGRAM(s) Oral daily  furosemide    Tablet 40 milliGRAM(s) Oral at bedtime  latanoprost 0.005% Ophthalmic Solution 1 Drop(s) Both EYES at bedtime  metoprolol succinate ER 50 milliGRAM(s) Oral daily  nicotine -   7 mG/24Hr(s) Patch 1 Patch Transdermal daily  pantoprazole    Tablet 40 milliGRAM(s) Oral before breakfast  senna 2 Tablet(s) Oral at bedtime      PHYSICAL EXAM:  T(C): 36.8 (04-09-18 @ 04:45), Max: 37 (04-08-18 @ 20:13)  HR: 86 (04-09-18 @ 04:45) (78 - 90)  BP: 110/73 (04-09-18 @ 04:45) (106/70 - 114/81)  RR: 17 (04-09-18 @ 04:45) (16 - 20)  SpO2: 97% (04-09-18 @ 04:45) (92% - 99%)  Wt(kg): --  I&O's Summary    08 Apr 2018 07:01  -  09 Apr 2018 07:00  --------------------------------------------------------  IN: 0 mL / OUT: 800 mL / NET: -800 mL          Appearance: Normal	  HEENT:   Normal oral mucosa, PERRL, EOMI	  Cardiovascular: Normal S1 S2, No JVD, No murmurs ,  Respiratory: Lungs clear to auscultation, normal effort 	  1+ edema      LABS:    CARDIAC MARKERS:                                12.5   8.91  )-----------( 212      ( 09 Apr 2018 06:25 )             42.6     04-09    143  |  93<L>  |  45<H>  ----------------------------<  103<H>  3.5   |  38<H>  |  1.55<H>    Ca    10.1      09 Apr 2018 06:25  Mg     1.7     04-09      proBNP:   Lipid Profile:   HgA1c:   TSH:           TELEMETRY: 	    ECG:  	  RADIOLOGY:   DIAGNOSTIC TESTING:  [ ] Echocardiogram:  [ ]  Catheterization:  [ ] Stress Test:    OTHER:

## 2018-04-09 NOTE — PROGRESS NOTE ADULT - PROBLEM SELECTOR PLAN 5
continue latanoprost
lasix 40mg iv x 1 now  f/u cardio
likely gout  on prednisone
likely gout  on prednisone
likely gout  on prednisone  pain is better
likely gout  on prednisone  pain is better
continue latanoprost

## 2018-04-09 NOTE — DISCHARGE NOTE ADULT - HOSPITAL COURSE
73 y/o male with a PMHx of CAD S/P stent placement, PAD S/P RLE stent placement, HTN, HLD, CKD stage III, and gout presents to ED with 1st and 2nd toe pain, found to be in rapid atrial fibrillation complicated by gout exacerbation.    He was treated for:  + New onset rapid atrial fibrillation- taken care of by Cardiologist, Dr. Castañeda, s/p Heparin gtt --> Eliquis and  on Metoprolol for rate control  + Gout exacerbation- podiatry following, no intervention necessary, He was started on Prednisone with Tramadol for pain PRN,  Colchicine was recommended but not given due to  worsening MARY GRACE.  Eventually he was given Allopurinol.   + MARY GRACE on CKD- cared for by Dr. Vazquez from Nephrology with IV fluid and Lasix.  + He had one episode of BRBPR after straining to have BM --> GI and colorectal surgery were consulted and he was started on PPI.  His symptoms thought to be  most likely hemorrhoidal bleeding.  Given his multiple comorbidities  no endoscopic evaluation was recommended at that point.    +UTI - Gram Neg Rods >100K, reincubated due to insuff growth, No Abx given as he was asymptomatic.      Testing showed:  EKG: Atrial fibrillation at 150 bpm  CE x2: Trop 0.07-->0.07, CK negative  H/H: 11.2/39.3  BUN/Cr: 37/1.50  AST/ALT: 72/71  4/1 X-ray left foot: No acute fracture or dislocation. Chronic healed fracture of the left   distal fibula with mild residual misalignment. No ankle joint effusion. Mild soft tissue swelling over the plantar and dorsal aspect of the foot.  4/1 X-ray bilateral knees: Vascular stent overlies the right femur/thigh. No acute fracture or dislocation. Healed fracture of the left distal fibula. Small left knee joint effusion. Soft tissue swelling is seen along the anterior aspect of the left femur. Extensive vascular calcifications are seen bilaterally.  tte:  Endocardium not well visualized. Grossly mild global  left ventricular systolic dysfunction. Septal consistent  with right ventricular overload.  2. Moderate right atrial enlargement.  3. Right ventricular enlargement with decreased right  ventricular systolic function.  4. Estimated pulmonary artery systolic pressure equals 55  mm Hg, assuming right atrial pressure equals 10  mm Hg,  consistent with moderate pulmonary hypertension.  RENAL us: No hydronephrosis of either kidney.      PROC: NATALY and ablation 4/5    Stable for discharge home 4/9/18. 73 y/o male with a PMHx of CAD S/P stent placement, PAD S/P RLE stent placement, HTN, HLD, CKD stage III, and gout presents to ED with 1st and 2nd toe pain, found to be in rapid atrial fibrillation complicated by gout exacerbation.    He was treated for:  + New onset rapid atrial fibrillation- taken care of by Cardiologist, Dr. Castañeda, s/p Heparin gtt --> Eliquis and  on Metoprolol for rate control  + Gout exacerbation- podiatry following, no intervention necessary, He was started on Prednisone with Tramadol for pain PRN,  Colchicine was recommended but not given due to  worsening MARY GRACE.  Eventually he was given Allopurinol.   + MARY GRACE on CKD- cared for by Dr. Vazquez from Nephrology with IV fluid and Lasix. (Cardiorenal, improved with diuresis)  + He had one episode of BRBPR after straining to have BM --> GI and colorectal surgery were consulted and he was started on PPI.  His symptoms thought to be  most likely hemorrhoidal bleeding.  Given his multiple comorbidities  no endoscopic evaluation was recommended at that point.    +UTI - Gram Neg Rods >100K, reincubated due to insuff growth, No Abx given as he was asymptomatic. no WBC, no fever. UA pretty clean.      Testing showed:  EKG: Atrial fibrillation at 150 bpm  CE x2: Trop 0.07-->0.07, CK negative  H/H: 11.2/39.3  BUN/Cr: 37/1.50  AST/ALT: 72/71  4/1 X-ray left foot: No acute fracture or dislocation. Chronic healed fracture of the left   distal fibula with mild residual misalignment. No ankle joint effusion. Mild soft tissue swelling over the plantar and dorsal aspect of the foot.  4/1 X-ray bilateral knees: Vascular stent overlies the right femur/thigh. No acute fracture or dislocation. Healed fracture of the left distal fibula. Small left knee joint effusion. Soft tissue swelling is seen along the anterior aspect of the left femur. Extensive vascular calcifications are seen bilaterally.  tte:  Endocardium not well visualized. Grossly mild global  left ventricular systolic dysfunction. Septal consistent  with right ventricular overload.  2. Moderate right atrial enlargement.  3. Right ventricular enlargement with decreased right  ventricular systolic function.  4. Estimated pulmonary artery systolic pressure equals 55  mm Hg, assuming right atrial pressure equals 10  mm Hg,  consistent with moderate pulmonary hypertension.  RENAL us: No hydronephrosis of either kidney.      PROC: NATALY and ablation 4/5    Stable for discharge home 4/9/18. 73 y/o male with a PMHx of CAD S/P stent placement, PAD S/P RLE stent placement, HTN, HLD, CKD stage III, and gout presents to ED with 1st and 2nd toe pain, found to be in rapid atrial fibrillation complicated by gout exacerbation.    He was treated for:  + New onset rapid atrial fibrillation- taken care of by Cardiologist, Dr. Castañeda, s/p Heparin gtt --> Eliquis and  on Metoprolol for rate control  + Gout exacerbation- podiatry following, no intervention necessary, He was started on Prednisone with Tramadol for pain PRN,  Colchicine was recommended but not given due to  worsening MARY GRACE.  Eventually he was given Allopurinol.   + MARY GRACE on CKD- cared for by Dr. Vazquez from Nephrology with IV fluid and Lasix. (Cardiorenal, improved with diuresis)  + He had one episode of BRBPR after straining to have BM --> GI and colorectal surgery were consulted and he was started on PPI.  His symptoms thought to be  most likely hemorrhoidal bleeding.  Given his multiple comorbidities  no endoscopic evaluation was recommended at that point.    +UTI - Gram Neg Rods >100K, reincubated due to insuff growth, No Abx given as he was asymptomatic. no WBC, no fever. UA pretty clean.      Testing showed:  EKG: Atrial fibrillation at 150 bpm  CE x2: Trop 0.07-->0.07, CK negative  H/H: 11.2/39.3  BUN/Cr: 37/1.50  AST/ALT: 72/71  4/1 X-ray left foot: No acute fracture or dislocation. Chronic healed fracture of the left   distal fibula with mild residual misalignment. No ankle joint effusion. Mild soft tissue swelling over the plantar and dorsal aspect of the foot.  4/1 X-ray bilateral knees: Vascular stent overlies the right femur/thigh. No acute fracture or dislocation. Healed fracture of the left distal fibula. Small left knee joint effusion. Soft tissue swelling is seen along the anterior aspect of the left femur. Extensive vascular calcifications are seen bilaterally.  tte:  Endocardium not well visualized. Grossly mild global  left ventricular systolic dysfunction. Septal consistent  with right ventricular overload.  2. Moderate right atrial enlargement.  3. Right ventricular enlargement with decreased right  ventricular systolic function.  4. Estimated pulmonary artery systolic pressure equals 55  mm Hg, assuming right atrial pressure equals 10  mm Hg,  consistent with moderate pulmonary hypertension.  RENAL us: No hydronephrosis of either kidney.      PROC: NATALY and ablation 4/5    Stable for discharge home 4/9/18.    More than 30 mins were spent evaluating patient and coordinating care for discharge. 71 y/o male with a PMHx of CAD S/P stent placement, PAD S/P RLE stent placement, HTN, HLD, CKD stage III, and gout presents to ED with 1st and 2nd toe pain, found to be in rapid atrial fibrillation complicated by gout exacerbation.    He was treated for:  + New onset rapid atrial fibrillation- taken care of by Cardiologist, Dr. Castañeda, s/p Heparin gtt --> Eliquis and  on Metoprolol for rate control  + Gout exacerbation- podiatry following, no intervention necessary, He was started on Prednisone with Tramadol for pain PRN,  Colchicine was recommended but not given due to  worsening MARY GRACE.  Eventually he was given Allopurinol.   + MARY GRACE on CKD- cared for by Dr. Vazquez from Nephrology with IV fluid and Lasix. (Cardiorenal, improved with diuresis)  + He had one episode of BRBPR after straining to have BM --> GI and colorectal surgery were consulted and he was started on PPI.  His symptoms thought to be  most likely hemorrhoidal bleeding.  Given his multiple comorbidities  no endoscopic evaluation was recommended at that point.    +UTI - Gram Neg Rods >100K, reincubated due to insuff growth, No Abx given as he was asymptomatic. no WBC, no fever. UA pretty clean.      Testing showed:  EKG: Atrial fibrillation at 150 bpm  CE x2: Trop 0.07-->0.07, CK negative  H/H: 11.2/39.3  BUN/Cr: 37/1.50  AST/ALT: 72/71  4/1 X-ray left foot: No acute fracture or dislocation. Chronic healed fracture of the left   distal fibula with mild residual misalignment. No ankle joint effusion. Mild soft tissue swelling over the plantar and dorsal aspect of the foot.  4/1 X-ray bilateral knees: Vascular stent overlies the right femur/thigh. No acute fracture or dislocation. Healed fracture of the left distal fibula. Small left knee joint effusion. Soft tissue swelling is seen along the anterior aspect of the left femur. Extensive vascular calcifications are seen bilaterally.  tte:  Endocardium not well visualized. Grossly mild global  left ventricular systolic dysfunction. Septal consistent  with right ventricular overload.  2. Moderate right atrial enlargement.  3. Right ventricular enlargement with decreased right  ventricular systolic function.  4. Estimated pulmonary artery systolic pressure equals 55  mm Hg, assuming right atrial pressure equals 10  mm Hg,  consistent with moderate pulmonary hypertension.  RENAL us: No hydronephrosis of either kidney.      PROC: NATALY and ablation 4/5    Stable for discharge home 4/9/18.    More than 30 mins were spent evaluating patient and coordinating care for discharge.      addendum: Acute kidney failure likely pre-renal. ATN ruled out.  Pt was treated for acute systolic heart failure.

## 2018-04-09 NOTE — PROGRESS NOTE ADULT - PROBLEM SELECTOR PROBLEM 4
Dyslipidemia
Atrial fibrillation with RVR
Dyslipidemia
Toe pain, left
Dyslipidemia

## 2018-04-09 NOTE — PROGRESS NOTE ADULT - PROBLEM SELECTOR PLAN 1
MARY GRACE likely cardio renal, improved with diuresing, renal function is stable likely at baseline. continue diuresing per cardio. monitor bmp  elevated PTH with normal vit d. start calcitriol 0.25mcg daily

## 2018-04-09 NOTE — PROGRESS NOTE ADULT - PROBLEM SELECTOR PROBLEM 3
Gout attack
Atrial fibrillation with RVR
Gout attack
Hyperkalemia
Gout attack

## 2018-04-09 NOTE — PROGRESS NOTE ADULT - ASSESSMENT
1.	CKD 3, stable. UPC is normal, and SONO findings are consistent with Chronic Tubulo-Interstitial Nephritis.  2.	UTI, on treatment.  3.	Atrial Fibrillation.  4.	Gout with recent flare.    PLAN:  1.	Continue diuretics as edema is still present.  2.	Monitor BMP.  3.	Once edema resolves, reduce Diuretics.  4.	Continue current Eliquis dose.
1.	MARY GRACE, improved. He is on Diuretics and has a high BUN:Cr, with low FeNa consistent with PreRenal, possible Cradiorenal  2.	CKD.  3.	CHF decompensation.  4.	Gout, on Colchicine.    PLAN:  1.	Continue diuretics.  2.	Monitor BNP.  3.	Once volume overload improves, reduce the diuretics.
1. Progressive renal failure  2. It appears grossly fluid overloaded with edema neck vein distention  3. Mild LV dysfunction with dilated right ventricle with decreased RV function and pulmonary hypertension  4. No significant valve disease      Recommend  EP consult for tachycardia that probably is an atrial tachycardia  Continue metoprolol  Renal consult appreciated will attempt to diuresis for fluid overload to see if there is improvement in renal function..
1. Progressive renal failure  2. It appears grossly fluid overloaded with edema neck vein distention  3. Mild LV dysfunction with dilated right ventricle with decreased RV function and pulmonary hypertension  4. No significant valve disease  5. Bright red blood per rectum no active GI bleeding  6. Renal function continues to improve      Recommend  NATALY cardioversion proceed as per EP  Continue metoprolol  continue diuresis.
1. Progressive renal failure  2. Still appears grossly fluid overloaded with edema neck vein distention but improved with diuresis  3. No significant valve disease  4. Renal function stable creatinine 2.08  5. S/P NATALY aflutter ablaton now NSR  6. 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 gout left foot?    Recommend  change metoprolol to topol XL 50  continue diuresis  OOB and ambulate  eliquis 5 BID
1. Progressive renal failure-rrenal function appears to be improving with diuresis  2. It appears grossly fluid overloaded with edema neck vein distention-patient improving with diuresis  3. Mild LV dysfunction with dilated right ventricle with decreased RV function and pulmonary hypertension  4. No significant valve disease  5. Persistent tachycardia probably atrial flutter with heart rates 125 nchanged with beta blocker      Recommend  EP consult for tachycardia ppreciated  patient now agreeable to ablation on 4/6 last 18  Continue metoprolol ccontinue IV diuresis
1. Renal fucntion improving  2. Somwhat alkalotic  3. No significant valve disease  4.. S/P NATALY aflutter ablaton now NSR  6. 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
1. Renal fucntion improving  2. Still somewaht fluid overloaded with edema  3. No significant valve disease  4. Renal function improving  5. S/P NATALY aflutter ablaton now NSR  6. 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 gout left foot?    Recommend   metoprolol to topol XL 50  continue diuresis  plavix 75 for chronic CAD no asa  eliquis 5 BID  switch to PO lasix today 80 mg AM 40 mg PM  daily weights  discharge planning next 24 hours
1. Renal fucntion mproving  2. Still somewaht fluid overloaded   3. No significant valve disease  4. Renal function improving  5. S/P NATALY aflutter ablaton now NSR  6. 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 gout left foot?    Recommend   metoprolol to topol XL 50  continue diuresis  plavix 75 for chronic CAD no asa  eliquis 5 BID  switch to PO lasix in AM  daily weights
71 yo male PMHx of HTN, Gout, HLD, CAD with stent "years ago," PAD with RLE stent,  p/w L 1st and 2nd toe pain, s/p unwitnessed fall 2 days ago
71 yo male PMHx of HTN, Gout, HLD, CAD with stent "years ago," PAD with RLE stent,  p/w L 1st and 2nd toe pain, s/p unwitnessed fall 2 days ago
71 yo male PMHx of HTN, Gout, HLD, CAD with stent "years ago," PAD with RLE stent, and CKD3 p/w L 1st and 2nd toe pain, s/p unwitnessed fall 2 days ago, found to be rapid afib @ 150s bpm, admitted to tele for Rapid Afib, r/o ACS and Gout Exacerbation.
71 yo male PMHx of HTN, Gout, HLD, CAD with stent "years ago," PAD with RLE stent, and CKD3 p/w L 1st and 2nd toe pain, s/p unwitnessed fall 2 days ago, found to be rapid afib @ 150s bpm, admitted to tele for Rapid Afib, r/o ACS and Gout Exacerbation.    +Rapid Afib  +Gout Exacerbation-->started on prednisone  +Acute kidney failure --> ATN, less likely pre-renal.
71 yo male PMHx of HTN, Gout, HLD, CAD with stent "years ago," PAD with RLE stent, and CKD3 p/w L 1st and 2nd toe pain, s/p unwitnessed fall 2 days ago, found to be rapid afib @ 150s bpm, admitted to tele for Rapid Afib, r/o ACS and Gout Exacerbation.    +Rapid Afib  +Gout Exacerbation-->started on prednisone  +Acute kidney failure --> IVF
73 yo M with pain to left forefoot MPJs 1-3 likely 2/2 gouty flare  - Pt seen and evaluated  - Xrays reviewed, no emergent or acute issues noted  - Rec colchicine daily while patient admitted, f/u outpatient with Dr. Alvarez [phone] upon discharge for continued management of gout, possibly with rheum in future  - MERCED/PVR reviewed prior  - No podiatric intervention planned at this time, no SOI  - Please reconsult podiatry if any new or worsening issues arise  - d/w attending
73 yo male PMHx of HTN, Gout, HLD, CAD with stent "years ago," PAD with RLE stent,  p/w L 1st and 2nd toe pain, s/p unwitnessed fall 2 days ago
73 yo male PMHx of HTN, Gout, HLD, CAD with stent "years ago," PAD with RLE stent, and CKD3 p/w L 1st and 2nd toe pain, s/p unwitnessed fall 2 days ago, found to be rapid afib @ 150s bpm, admitted to tele for Rapid Afib, r/o ACS and Gout Exacerbation.
73 yo male PMHx of HTN, Gout, HLD, CAD with stent "years ago," PAD with RLE stent, and CKD3 p/w L 1st and 2nd toe pain, s/p unwitnessed fall 2 days ago, found to be rapid afib @ 150s bpm, admitted to tele for Rapid Afib, r/o ACS and Gout Exacerbation.    +Rapid Afib  +Gout Exacerbation-->started on prednisone  +Acute kidney failure --> ATN, less likely pre-renal.
73 yo male with past medical history of CAD s/p stent x1, PVD with right fem-pop bypass, HTN, HLD, CKD, prostate cancer s/p prostatectomy and gout admitted with ADHF in the setting of rapid atrial tachycardia.  He is now s/p NATALY/AFlutter ablation, maintaining NSR, LVEF depressed likely in the setting of tachy-related cardiomyopthy:    - will unlikely tolerate an ACE-I at this time given renal dysfunction and soft BPs; further mgmt per Neph  - continue beta blocker therapy as recommended by Cardiology  - repeat 2D TTE in 3 mos to reevaluate LV systolic function while on Metoprolol and in NSR  - teaching completed on wound care  - he will follow up with Dr. Centeno as an outpatient (we will obtain a date/time)  - d/w Dr. Centeno
73 yo male with past medical history of CAD s/p stent x1. PVD with right fem-pop bypass, HTN, HLD, CKD, prostate cancer s/p prostatectomy and gout admitted with gouty attack and ADHF and in the setting of rapid atrial tachycardia:    - continue diuresis, anticoagulation with heparin and beta blocker therapy; uptitrate beta blocker as BP permits  - he is prepared for SVT ablation later this afternoon after anoscopy for hemorrhoidal bleeding per GI  - d/w Dr. Centeno  - keep NPO, we will follow closely
73 yo male with past medical history of CAD s/p stent x1. PVD with right fem-pop bypass, HTN, HLD, CKD, prostate cancer s/p prostatectomy and gout admitted with gouty attack, ADHF and in the setting of rapid atrial tachycardia:    - continue diuresis, anticoagulation with heparin and beta blocker therapy; uptitrate beta blocker as BP permits  - we will arrange for a ablation likely for this Friday  - d/w Dr. Centeno
71 yo male PMHx of HTN, Gout, HLD, CAD with stent "years ago," PAD with RLE stent, and CKD3 p/w L 1st and 2nd toe pain, s/p unwitnessed fall 2 days ago, found to be rapid afib @ 150s bpm, admitted to tele for Rapid Afib, r/o ACS and Gout Exacerbation.
73 yo male PMHx of HTN, Gout, HLD, CAD with stent "years ago," PAD with RLE stent, and CKD3 p/w L 1st and 2nd toe pain, s/p unwitnessed fall 2 days ago, found to be rapid afib @ 150s bpm, admitted to tele for Rapid Afib, r/o ACS and Gout Exacerbation.

## 2018-04-09 NOTE — DISCHARGE NOTE ADULT - MEDICATION SUMMARY - MEDICATIONS TO TAKE
I will START or STAY ON the medications listed below when I get home from the hospital:    apixaban 5 mg oral tablet  -- 1 tab(s) by mouth every 12 hours  -- Indication: For blood thinner    allopurinol 100 mg oral tablet  -- 1 tab(s) by mouth once a day  -- Indication: For Gout    pravastatin 40 mg oral tablet  -- tab(s) by mouth once a day in morning  -- Indication: For cholesterol    clopidogrel 75 mg oral tablet  -- 1 tab(s) by mouth once a day  -- Indication: For blood thinner    metoprolol succinate 50 mg oral tablet, extended release  -- 1 tab(s) by mouth once a day  -- Indication: For Heart rate and blood pressure    furosemide 40 mg oral tablet  -- 1 tab(s) by mouth 2 times a day  -- Indication: For fluid balance    docusate sodium 100 mg oral capsule  -- 1 cap(s) by mouth 3 times a day  -- Indication: For constipation    senna oral tablet  -- 2 tab(s) by mouth once a day (at bedtime)  -- Indication: For constipation    latanoprost 0.005% ophthalmic solution  -- 1 drop(s) to each affected eye once a day (at bedtime)  -- Indication: For Glaucoma    pantoprazole 40 mg oral delayed release tablet  -- 1 tab(s) by mouth once a day (before a meal)  -- Indication: For stomach protection    nicotine 7 mg/24 hr transdermal film, extended release  -- 1 patch by transdermal patch once a day  -- Indication: For Nicotine cravings    nicotine 2 mg oral transmucosal gum  -- 1 gum chewed every 2 hours, As Needed for breakthrough cravings  -- Do not take this drug if you are pregnant.  It is very important that you take or use this exactly as directed.  Do not skip doses or discontinue unless directed by your doctor.    -- Indication: For Nicotine cravings    Rocaltrol 0.25 mcg oral capsule  -- 1 cap(s) by mouth once a day  -- Indication: For supplement

## 2018-04-09 NOTE — DISCHARGE NOTE ADULT - PATIENT PORTAL LINK FT
You can access the [facility] Portal, offered by [facility], by registering with the following website: [email or web address]

## 2018-04-09 NOTE — DISCHARGE NOTE ADULT - CARE PROVIDERS DIRECT ADDRESSES
,DirectAddress_Unknown,DirectAddress_Unknown,DirectAddress_Unknown ,DirectAddress_Unknown,DirectAddress_Unknown,[email or web address]

## 2018-04-09 NOTE — PROGRESS NOTE ADULT - PROVIDER SPECIALTY LIST ADULT
Cardiology
Electrophysiology
Internal Medicine
Nephrology
Podiatry
Internal Medicine

## 2018-04-09 NOTE — PROGRESS NOTE ADULT - PROBLEM SELECTOR PROBLEM 5
Glaucoma
Edema leg
Glaucoma
Toe pain, left
Glaucoma

## 2018-04-09 NOTE — PROGRESS NOTE ADULT - ATTENDING COMMENTS
Marta Asencio MD ( [facility])  [phone]
- Dr. ADINA King ([facility])  - [phone]
Ron Paul will be covering me starting 4/4/18. He can be reached at  if needed.     - Dr. ADINA King ([facility])  - [phone]
physical therapy.     Dr. Asencio will be covering me starting 4/7/18. Please paged her at  if needed.     - Dr. ADINA King ([facility])  - [phone]
to f/u with PCP Dr. Solis at [facility] and cardiology/EP.     - Dr. ADINA King ([facility])  - [phone]
Marta Asencio MD ( [facility])  [phone]

## 2018-04-11 LAB
HGB A MFR BLD: 97.7 % — SIGNIFICANT CHANGE UP
HGB A2 MFR BLD: 1.9 % — LOW (ref 2.4–3.5)
HGB ELECT COMMENT: SIGNIFICANT CHANGE UP
HGB F MFR BLD: < 1 % — SIGNIFICANT CHANGE UP (ref 0–1.5)

## 2018-04-20 ENCOUNTER — APPOINTMENT (OUTPATIENT)
Dept: ELECTROPHYSIOLOGY | Facility: CLINIC | Age: 73
End: 2018-04-20
Payer: MEDICARE

## 2018-04-20 VITALS
HEIGHT: 70 IN | HEART RATE: 99 BPM | SYSTOLIC BLOOD PRESSURE: 106 MMHG | BODY MASS INDEX: 24.05 KG/M2 | DIASTOLIC BLOOD PRESSURE: 72 MMHG | WEIGHT: 168 LBS

## 2018-04-20 DIAGNOSIS — I48.3 TYPICAL ATRIAL FLUTTER: ICD-10-CM

## 2018-04-20 DIAGNOSIS — Z98.890 OTHER SPECIFIED POSTPROCEDURAL STATES: ICD-10-CM

## 2018-04-20 DIAGNOSIS — Z86.79 OTHER SPECIFIED POSTPROCEDURAL STATES: ICD-10-CM

## 2018-04-20 PROCEDURE — 93000 ELECTROCARDIOGRAM COMPLETE: CPT

## 2018-04-20 PROCEDURE — 99213 OFFICE O/P EST LOW 20 MIN: CPT

## 2018-04-20 RX ORDER — ALLOPURINOL 300 MG/1
300 TABLET ORAL DAILY
Refills: 0 | Status: ACTIVE | COMMUNITY
Start: 2018-04-20

## 2018-04-20 RX ORDER — APIXABAN 5 MG/1
5 TABLET, FILM COATED ORAL
Qty: 60 | Refills: 5 | Status: ACTIVE | COMMUNITY
Start: 2018-04-20

## 2018-04-20 RX ORDER — CALCITRIOL 0.25 UG/1
0.25 CAPSULE, LIQUID FILLED ORAL DAILY
Refills: 0 | Status: ACTIVE | COMMUNITY
Start: 2018-04-20

## 2018-04-20 RX ORDER — MULTIVIT-MIN/IRON/FOLIC ACID/K 18-600-40
CAPSULE ORAL
Refills: 0 | Status: DISCONTINUED | COMMUNITY
End: 2018-04-20

## 2018-04-20 RX ORDER — AMLODIPINE BESYLATE 5 MG/1
5 TABLET ORAL
Refills: 0 | Status: DISCONTINUED | COMMUNITY
End: 2018-04-20

## 2018-04-20 RX ORDER — EDOXABAN TOSYLATE 60 MG/1
TABLET, FILM COATED ORAL
Refills: 0 | Status: DISCONTINUED | COMMUNITY
End: 2018-04-20

## 2018-04-20 RX ORDER — CHROMIUM 200 MCG
TABLET ORAL
Refills: 0 | Status: DISCONTINUED | COMMUNITY
End: 2018-04-20

## 2018-05-18 NOTE — PATIENT PROFILE ADULT. - REASON FOR ADMISSION
Ordered keflex abx(penicillin based abx), but this may not resolve it and Gadieljackie Hollis may have to have it drained by surgeon (ie [facility] Dr Judit Milian) Pt noticed that he couldn't walk since Friday night Saturday morning. Left foot swelling noted. Positive pulses, positive capillary refill and mobility. Pt states he is unable to walk with left foot due to swelling.

## 2018-07-30 PROBLEM — M10.9 GOUT, UNSPECIFIED: Chronic | Status: ACTIVE | Noted: 2018-04-01

## 2018-08-06 ENCOUNTER — APPOINTMENT (OUTPATIENT)
Dept: THORACIC SURGERY | Facility: CLINIC | Age: 73
End: 2018-08-06
Payer: MEDICARE

## 2018-08-06 VITALS
OXYGEN SATURATION: 89 % | SYSTOLIC BLOOD PRESSURE: 131 MMHG | WEIGHT: 172 LBS | HEIGHT: 70 IN | RESPIRATION RATE: 16 BRPM | HEART RATE: 77 BPM | DIASTOLIC BLOOD PRESSURE: 80 MMHG | BODY MASS INDEX: 24.62 KG/M2

## 2018-08-06 PROCEDURE — 99214 OFFICE O/P EST MOD 30 MIN: CPT

## 2018-08-06 RX ORDER — VITAMIN B COMPLEX
TABLET ORAL
Refills: 0 | Status: ACTIVE | COMMUNITY

## 2018-08-15 ENCOUNTER — APPOINTMENT (OUTPATIENT)
Dept: OPHTHALMOLOGY | Facility: CLINIC | Age: 73
End: 2018-08-15
Payer: MEDICARE

## 2018-08-15 DIAGNOSIS — H18.52 EPITHELIAL (JUVENILE) CORNEAL DYSTROPHY: ICD-10-CM

## 2018-08-15 PROCEDURE — 92002 INTRM OPH EXAM NEW PATIENT: CPT

## 2018-08-15 PROCEDURE — 92025 CPTRIZED CORNEAL TOPOGRAPHY: CPT

## 2018-11-12 ENCOUNTER — APPOINTMENT (OUTPATIENT)
Dept: THORACIC SURGERY | Facility: CLINIC | Age: 73
End: 2018-11-12
Payer: MEDICARE

## 2018-11-12 VITALS
TEMPERATURE: 98.4 F | BODY MASS INDEX: 25.34 KG/M2 | DIASTOLIC BLOOD PRESSURE: 86 MMHG | HEART RATE: 90 BPM | RESPIRATION RATE: 18 BRPM | HEIGHT: 70 IN | SYSTOLIC BLOOD PRESSURE: 152 MMHG | OXYGEN SATURATION: 82 % | WEIGHT: 177 LBS

## 2018-11-12 DIAGNOSIS — R91.8 OTHER NONSPECIFIC ABNORMAL FINDING OF LUNG FIELD: ICD-10-CM

## 2018-11-12 DIAGNOSIS — R59.9 ENLARGED LYMPH NODES, UNSPECIFIED: ICD-10-CM

## 2018-11-12 DIAGNOSIS — C34.90 MALIGNANT NEOPLASM OF UNSPECIFIED PART OF UNSPECIFIED BRONCHUS OR LUNG: ICD-10-CM

## 2018-11-12 PROCEDURE — 99214 OFFICE O/P EST MOD 30 MIN: CPT

## 2019-02-25 ENCOUNTER — INPATIENT (INPATIENT)
Facility: HOSPITAL | Age: 74
LOS: 3 days | Discharge: HOME | End: 2019-03-01
Attending: INTERNAL MEDICINE | Admitting: INTERNAL MEDICINE
Payer: MEDICARE

## 2019-02-25 VITALS
OXYGEN SATURATION: 91 % | SYSTOLIC BLOOD PRESSURE: 107 MMHG | TEMPERATURE: 98 F | RESPIRATION RATE: 17 BRPM | HEART RATE: 80 BPM | DIASTOLIC BLOOD PRESSURE: 81 MMHG

## 2019-02-25 NOTE — ED ADULT NURSE NOTE - OBJECTIVE STATEMENT
Patient brought into room 27.A&OX3 ambulatory male presents to the ED today for new PVD ulcer measuring 3x1 on right shin. Patient extremities cold to touch. Patient noticed ulcer 2 months ago and went to his PMD today where he was sent in for further eval. Patient states there has been new skin marks on his back and arms for 6-7 months. Patient wearing 2L nc on arrival. Patient reports extreme pain to right lower foot due to Patient brought into room 27.A&OX3 ambulatory male presents to the ED today for new PVD ulcer measuring 3x1 on right shin. Patient extremities cold to touch. Patient noticed ulcer 2 months ago and went to his PMD today where he was sent in for further eval. Patient states there has been new skin marks on his back and arms for 6-7 months. Patient wearing 2L nc on arrival. Patient reports extreme pain to right lower foot due to potietry nail cutting. Patient breathing in even unlabored respirations and speaking in clear and full sentences. Awaiting MD weaver

## 2019-02-25 NOTE — ED PROVIDER NOTE - PHYSICAL EXAMINATION
PHYSICAL EXAM:  GENERAL: NAD, well-developed  HEAD:  Atraumatic, Normocephalic  EYES: EOMI, PERRLA, conjunctiva and sclera clear  NECK: Supple, No JVD  CHEST/LUNG: Clear to auscultation bilaterally; No wheeze  HEART: Regular rate and rhythm; No murmurs, rubs, or gallops  ABDOMEN: Soft, Nontender, Nondistended; Bowel sounds present  EXTREMITIES: pulses +2 UE, LE pulses not palpable by touch or doppler, LE cold, +2 LE pitting edema. Sensation intake   PSYCH: AAOx3  NEUROLOGY: non-focal  SKIN: No rashes or lesions

## 2019-02-25 NOTE — ED PROVIDER NOTE - ATTENDING CONTRIBUTION TO CARE
Moriah: 72 yo male with a h/o HTN, gout, HLD, Afib on Eliquis, CAD w/ stent, PAD w/ stent and CKD sent in by his cardiologist for concern for a pulseless RLE. Pt endorses that he has been having progressively worsening pain in the RLE especially with exertion for 2 weeks. However, over the last several days the foot has felt colder and more painful then usual. No associated fevers or chills. Pt was seen by his cardiologist earlier today and referred to the ED. Pt followed by Dr dowd of vascular surgery. Pt denies any paresthesias in the extremity. No chest pain or SOB. Exam: GENERAL: chronically ill appearing, NAD, HEENT: MMM,  CARDIO: +S1/S2, no murmurs, rubs or gallops, LUNGS: CTA B/L, no wheezing, rales or rhonchi, ABD: soft, nontender, BSx4 quadrants, no guarding or rigidity. EXT: b/l LE lack palpable pulses distal to popliteal, popliteal pulses 1+. RLE has no capillary refill distally, foot is cold, sensation in tact. unable to obtain distal pulse with doppler either on right side. + diffuse foot TTP.  NEURO: AxOx3, SKIN: right anterior tibia abrasion, no signs of surrounding infection- no erythema, warmth tot ouch or pain. A/P- 72 yo male with concern for occlusion of vascular stent in RLE. will obtain cbc, cmp, vbg, and consult vascular.

## 2019-02-25 NOTE — ED PROVIDER NOTE - NS ED ROS FT
REVIEW OF SYSTEMS:    CONSTITUTIONAL: No weakness, fevers or chills  EYES/ENT: No visual changes;  No vertigo or throat pain   NECK: No pain or stiffness  RESPIRATORY: No cough, wheezing, hemoptysis; No shortness of breath  CARDIOVASCULAR: No chest pain or palpitations  GASTROINTESTINAL: No abdominal or epigastric pain. No nausea, vomiting, or hematemesis; No diarrhea or constipation. No melena or hematochezia.  GENITOURINARY: No dysuria, frequency or hematuria.  NEUROLOGICAL: No numbness or weakness. RLE pain radiating up   SKIN: No itching, burning, rashes, or lesions   All other review of systems is negative unless indicated above.

## 2019-02-25 NOTE — ED PROVIDER NOTE - CLINICAL SUMMARY MEDICAL DECISION MAKING FREE TEXT BOX
73 y/o w/ hx of severe PAD p/w RLE pain, RLE wound. LE pulses not palpable, could not doppler pulses. Vascular surgery paged for stat consult, will see.

## 2019-02-25 NOTE — ED PROVIDER NOTE - OBJECTIVE STATEMENT
71 y/o M w/ PMHx HTN, gout, HLD, Afib on Eliquis, CAD w/ stent, PAD w/ RLE stent placement, CKD III presenting worsening RLE pain and RLE wound that is not healing. Pt states he 73 y/o M w/ PMHx HTN, gout, HLD, Afib on Eliquis, CAD w/ stent, PAD w/ RLE stent placement, CKD III presenting worsening RLE pain and RLE wound that is not healing. Pt states he has been having RLE pain that starts at the toes and radiates up to the groin. 71 y/o M w/ PMHx HTN, gout, HLD, Afib on Eliquis, CAD w/ stent, PAD w/ RLE stent placement, CKD III presenting worsening RLE pain and RLE wound that is not healing. Pt states he has been having RLE pain that starts at the toes and radiates up to the groin. Pt saw his cardiologist in the AM, stated that he should go to the ED for admission for the RLE pain and non-healing wound. Pt states his extremities have been cold for years, intermittent numbness, sensation intact in the LE. On Eliquis for Afib, takes lasix 40 mg daily.

## 2019-02-25 NOTE — ED PROVIDER NOTE - SHIFT CHANGE DETAILS
I have signed over this patient to the above attending physician. Pertinent history, physical exam findings and workup thus far in the ED have been discussed. The pending tests and plan, including labs and vascular evaluation were signed over.  All questions from the above attending physician have been answered.

## 2019-02-26 DIAGNOSIS — I73.9 PERIPHERAL VASCULAR DISEASE, UNSPECIFIED: ICD-10-CM

## 2019-02-26 DIAGNOSIS — N18.3 CHRONIC KIDNEY DISEASE, STAGE 3 (MODERATE): ICD-10-CM

## 2019-02-26 DIAGNOSIS — N17.9 ACUTE KIDNEY FAILURE, UNSPECIFIED: ICD-10-CM

## 2019-02-26 DIAGNOSIS — N18.9 CHRONIC KIDNEY DISEASE, UNSPECIFIED: ICD-10-CM

## 2019-02-26 DIAGNOSIS — I25.10 ATHEROSCLEROTIC HEART DISEASE OF NATIVE CORONARY ARTERY WITHOUT ANGINA PECTORIS: ICD-10-CM

## 2019-02-26 DIAGNOSIS — E78.5 HYPERLIPIDEMIA, UNSPECIFIED: ICD-10-CM

## 2019-02-26 DIAGNOSIS — I10 ESSENTIAL (PRIMARY) HYPERTENSION: ICD-10-CM

## 2019-02-26 DIAGNOSIS — H40.9 UNSPECIFIED GLAUCOMA: ICD-10-CM

## 2019-02-26 DIAGNOSIS — Z29.9 ENCOUNTER FOR PROPHYLACTIC MEASURES, UNSPECIFIED: ICD-10-CM

## 2019-02-26 DIAGNOSIS — I48.91 UNSPECIFIED ATRIAL FIBRILLATION: ICD-10-CM

## 2019-02-26 LAB
ALBUMIN SERPL ELPH-MCNC: 3.7 G/DL — SIGNIFICANT CHANGE UP (ref 3.3–5)
ALP SERPL-CCNC: 97 U/L — SIGNIFICANT CHANGE UP (ref 40–120)
ALT FLD-CCNC: 33 U/L — SIGNIFICANT CHANGE UP (ref 4–41)
ANION GAP SERPL CALC-SCNC: 17 MMO/L — HIGH (ref 7–14)
APPEARANCE UR: CLEAR — SIGNIFICANT CHANGE UP
APTT BLD: 34.9 SEC — SIGNIFICANT CHANGE UP (ref 27.5–36.3)
AST SERPL-CCNC: 63 U/L — HIGH (ref 4–40)
BACTERIA # UR AUTO: SIGNIFICANT CHANGE UP
BASE EXCESS BLDV CALC-SCNC: 14 MMOL/L — SIGNIFICANT CHANGE UP
BILIRUB SERPL-MCNC: 2 MG/DL — HIGH (ref 0.2–1.2)
BILIRUB UR-MCNC: NEGATIVE — SIGNIFICANT CHANGE UP
BLD GP AB SCN SERPL QL: NEGATIVE — SIGNIFICANT CHANGE UP
BLOOD UR QL VISUAL: NEGATIVE — SIGNIFICANT CHANGE UP
BUN SERPL-MCNC: 39 MG/DL — HIGH (ref 7–23)
CALCIUM SERPL-MCNC: 9.4 MG/DL — SIGNIFICANT CHANGE UP (ref 8.4–10.5)
CHLORIDE SERPL-SCNC: 87 MMOL/L — LOW (ref 98–107)
CO2 SERPL-SCNC: 36 MMOL/L — HIGH (ref 22–31)
COLOR SPEC: SIGNIFICANT CHANGE UP
CREAT ?TM UR-MCNC: 175.9 MG/DL — SIGNIFICANT CHANGE UP
CREAT SERPL-MCNC: 2.37 MG/DL — HIGH (ref 0.5–1.3)
GAS PNL BLDV: 137 MMOL/L — SIGNIFICANT CHANGE UP (ref 136–146)
GLUCOSE BLDC GLUCOMTR-MCNC: 82 MG/DL — SIGNIFICANT CHANGE UP (ref 70–99)
GLUCOSE BLDV-MCNC: 90 — SIGNIFICANT CHANGE UP (ref 70–99)
GLUCOSE SERPL-MCNC: 91 MG/DL — SIGNIFICANT CHANGE UP (ref 70–99)
GLUCOSE UR-MCNC: NEGATIVE — SIGNIFICANT CHANGE UP
HCO3 BLDV-SCNC: 34 MMOL/L — HIGH (ref 20–27)
HCT VFR BLD CALC: 43.2 % — SIGNIFICANT CHANGE UP (ref 39–50)
HCT VFR BLDV CALC: 40.1 % — SIGNIFICANT CHANGE UP (ref 39–51)
HGB BLD-MCNC: 12.5 G/DL — LOW (ref 13–17)
HGB BLDV-MCNC: 13.1 G/DL — SIGNIFICANT CHANGE UP (ref 13–17)
HYALINE CASTS # UR AUTO: SIGNIFICANT CHANGE UP
INR BLD: 4.77 — HIGH (ref 0.88–1.17)
KETONES UR-MCNC: NEGATIVE — SIGNIFICANT CHANGE UP
LEUKOCYTE ESTERASE UR-ACNC: SIGNIFICANT CHANGE UP
MAGNESIUM SERPL-MCNC: 1.6 MG/DL — SIGNIFICANT CHANGE UP (ref 1.6–2.6)
MCHC RBC-ENTMCNC: 24.2 PG — LOW (ref 27–34)
MCHC RBC-ENTMCNC: 28.9 % — LOW (ref 32–36)
MCV RBC AUTO: 83.6 FL — SIGNIFICANT CHANGE UP (ref 80–100)
NITRITE UR-MCNC: NEGATIVE — SIGNIFICANT CHANGE UP
NRBC # FLD: 0.04 K/UL — LOW (ref 25–125)
PCO2 BLDV: 75 MMHG — HIGH (ref 41–51)
PH BLDV: 7.35 PH — SIGNIFICANT CHANGE UP (ref 7.32–7.43)
PH UR: 6.5 — SIGNIFICANT CHANGE UP (ref 5–8)
PHOSPHATE SERPL-MCNC: 4 MG/DL — SIGNIFICANT CHANGE UP (ref 2.5–4.5)
PLATELET # BLD AUTO: 249 K/UL — SIGNIFICANT CHANGE UP (ref 150–400)
PMV BLD: 11.2 FL — SIGNIFICANT CHANGE UP (ref 7–13)
PO2 BLDV: 22 MMHG — LOW (ref 35–40)
POTASSIUM BLDV-SCNC: 3.3 MMOL/L — LOW (ref 3.4–4.5)
POTASSIUM SERPL-MCNC: 3.5 MMOL/L — SIGNIFICANT CHANGE UP (ref 3.5–5.3)
POTASSIUM SERPL-SCNC: 3.5 MMOL/L — SIGNIFICANT CHANGE UP (ref 3.5–5.3)
PROT SERPL-MCNC: 6.4 G/DL — SIGNIFICANT CHANGE UP (ref 6–8.3)
PROT UR-MCNC: 50 — SIGNIFICANT CHANGE UP
PROTHROM AB SERPL-ACNC: 55.6 SEC — HIGH (ref 9.8–13.1)
RBC # BLD: 5.17 M/UL — SIGNIFICANT CHANGE UP (ref 4.2–5.8)
RBC # FLD: 23.4 % — HIGH (ref 10.3–14.5)
RBC CASTS # UR COMP ASSIST: SIGNIFICANT CHANGE UP (ref 0–?)
RH IG SCN BLD-IMP: POSITIVE — SIGNIFICANT CHANGE UP
SAO2 % BLDV: 24.1 % — LOW (ref 60–85)
SODIUM SERPL-SCNC: 140 MMOL/L — SIGNIFICANT CHANGE UP (ref 135–145)
SODIUM UR-SCNC: 28 MMOL/L — SIGNIFICANT CHANGE UP
SP GR SPEC: 1.02 — SIGNIFICANT CHANGE UP (ref 1–1.04)
SQUAMOUS # UR AUTO: SIGNIFICANT CHANGE UP
UROBILINOGEN FLD QL: HIGH
WBC # BLD: 4.84 K/UL — SIGNIFICANT CHANGE UP (ref 3.8–10.5)
WBC # FLD AUTO: 4.84 K/UL — SIGNIFICANT CHANGE UP (ref 3.8–10.5)
WBC UR QL: HIGH (ref 0–?)

## 2019-02-26 RX ORDER — ACETAMINOPHEN 500 MG
650 TABLET ORAL EVERY 6 HOURS
Qty: 0 | Refills: 0 | Status: DISCONTINUED | OUTPATIENT
Start: 2019-02-26 | End: 2019-03-01

## 2019-02-26 RX ORDER — BUDESONIDE AND FORMOTEROL FUMARATE DIHYDRATE 160; 4.5 UG/1; UG/1
2 AEROSOL RESPIRATORY (INHALATION)
Qty: 0 | Refills: 0 | Status: DISCONTINUED | OUTPATIENT
Start: 2019-02-26 | End: 2019-03-01

## 2019-02-26 RX ORDER — TRAMADOL HYDROCHLORIDE 50 MG/1
50 TABLET ORAL EVERY 6 HOURS
Qty: 0 | Refills: 0 | Status: DISCONTINUED | OUTPATIENT
Start: 2019-02-26 | End: 2019-02-28

## 2019-02-26 RX ORDER — APIXABAN 2.5 MG/1
5 TABLET, FILM COATED ORAL EVERY 12 HOURS
Qty: 0 | Refills: 0 | Status: DISCONTINUED | OUTPATIENT
Start: 2019-02-26 | End: 2019-02-26

## 2019-02-26 RX ORDER — LATANOPROST 0.05 MG/ML
1 SOLUTION/ DROPS OPHTHALMIC; TOPICAL AT BEDTIME
Qty: 0 | Refills: 0 | Status: DISCONTINUED | OUTPATIENT
Start: 2019-02-26 | End: 2019-03-01

## 2019-02-26 RX ORDER — HEPARIN SODIUM 5000 [USP'U]/ML
5000 INJECTION INTRAVENOUS; SUBCUTANEOUS EVERY 8 HOURS
Qty: 0 | Refills: 0 | Status: DISCONTINUED | OUTPATIENT
Start: 2019-02-26 | End: 2019-02-26

## 2019-02-26 RX ORDER — METOPROLOL TARTRATE 50 MG
50 TABLET ORAL DAILY
Qty: 0 | Refills: 0 | Status: DISCONTINUED | OUTPATIENT
Start: 2019-02-26 | End: 2019-02-26

## 2019-02-26 RX ORDER — HYDROXYZINE HCL 10 MG
25 TABLET ORAL
Qty: 0 | Refills: 0 | Status: DISCONTINUED | OUTPATIENT
Start: 2019-02-26 | End: 2019-03-01

## 2019-02-26 RX ORDER — FLUTICASONE FUROATE, UMECLIDINIUM BROMIDE AND VILANTEROL TRIFENATATE 200; 62.5; 25 UG/1; UG/1; UG/1
1 POWDER RESPIRATORY (INHALATION)
Qty: 0 | Refills: 0 | COMMUNITY

## 2019-02-26 RX ORDER — HYDROXYZINE HCL 10 MG
1 TABLET ORAL
Qty: 0 | Refills: 0 | COMMUNITY

## 2019-02-26 RX ORDER — CLOPIDOGREL BISULFATE 75 MG/1
75 TABLET, FILM COATED ORAL DAILY
Qty: 0 | Refills: 0 | Status: DISCONTINUED | OUTPATIENT
Start: 2019-02-26 | End: 2019-03-01

## 2019-02-26 RX ORDER — CALCITRIOL 0.5 UG/1
0.25 CAPSULE ORAL DAILY
Qty: 0 | Refills: 0 | Status: DISCONTINUED | OUTPATIENT
Start: 2019-02-26 | End: 2019-03-01

## 2019-02-26 RX ORDER — ATORVASTATIN CALCIUM 80 MG/1
10 TABLET, FILM COATED ORAL AT BEDTIME
Qty: 0 | Refills: 0 | Status: DISCONTINUED | OUTPATIENT
Start: 2019-02-26 | End: 2019-03-01

## 2019-02-26 RX ORDER — METOPROLOL TARTRATE 50 MG
25 TABLET ORAL DAILY
Qty: 0 | Refills: 0 | Status: DISCONTINUED | OUTPATIENT
Start: 2019-02-26 | End: 2019-03-01

## 2019-02-26 RX ADMIN — CALCITRIOL 0.25 MICROGRAM(S): 0.5 CAPSULE ORAL at 12:59

## 2019-02-26 RX ADMIN — BUDESONIDE AND FORMOTEROL FUMARATE DIHYDRATE 2 PUFF(S): 160; 4.5 AEROSOL RESPIRATORY (INHALATION) at 21:39

## 2019-02-26 RX ADMIN — Medication 25 MILLIGRAM(S): at 13:00

## 2019-02-26 RX ADMIN — LATANOPROST 1 DROP(S): 0.05 SOLUTION/ DROPS OPHTHALMIC; TOPICAL at 21:39

## 2019-02-26 RX ADMIN — CLOPIDOGREL BISULFATE 75 MILLIGRAM(S): 75 TABLET, FILM COATED ORAL at 15:02

## 2019-02-26 RX ADMIN — ATORVASTATIN CALCIUM 10 MILLIGRAM(S): 80 TABLET, FILM COATED ORAL at 21:40

## 2019-02-26 RX ADMIN — BUDESONIDE AND FORMOTEROL FUMARATE DIHYDRATE 2 PUFF(S): 160; 4.5 AEROSOL RESPIRATORY (INHALATION) at 12:59

## 2019-02-26 NOTE — H&P ADULT - PROBLEM SELECTOR PLAN 1
Appreciate vascular input  Given MARY GRACE, and chronicity of his symptoms, unlikely that there is an emergent indication to do LE angiogram  Cont statin, plavix  Ordered rt arterial duplex to assess patency of bypass

## 2019-02-26 NOTE — CONSULT NOTE ADULT - PROBLEM SELECTOR RECOMMENDATION 9
? etiology  scr 2.37 on admission. takes lasix 40mg daily at home   currently lasix on hold  check UA, urine cr, urea, renal US  monitor bmp  avoid nephrotoxic agents

## 2019-02-26 NOTE — ED ADULT NURSE REASSESSMENT NOTE - NS ED NURSE REASSESS COMMENT FT1
Pt admitted to med.  awaiting bed.  pt denies pain.  awaiting further orders.  will continue to monitor

## 2019-02-26 NOTE — CONSULT NOTE ADULT - ASSESSMENT
73M with chronic RLE rest pain, PAD now with non-healing R anterior shin wound. PAD appears chronic, patient has had symptoms for months-years.     - No acute vascular surgery intervention at this time as no signs of acute limb ischemia  - Patient may follow up as an outpatient for MERCED/PVR and angiogram   - Consider gentle hydration or nephrology consult for MARY GRACE    D/W Dr. Gonzalez    [facility] [phone]
74 y/o M w/ PMHx HTN, gout, HLD, Afib on Eliquis, CAD w/ stent, PAD w/ RLE stent placement, CKD III presenting worsening RLE pain and RLE wound that is not healing. Lab also noted to have acute on ckd

## 2019-02-26 NOTE — CONSULT NOTE ADULT - ATTENDING COMMENTS
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 Please note: I signed this consult note in error.    Not aware of this pt and was not seen by me.    Please consult vascular team to assign to on call vascular attd if needed.  EMEKA

## 2019-02-26 NOTE — H&P ADULT - PROBLEM SELECTOR PLAN 6
Cont metoprolol succinate 25 mg PO daily  Eliquis on hold 2' elevated INR  May consider decreasing dose to 2.5 mg bid if his serum Cr remains above 1.5

## 2019-02-26 NOTE — H&P ADULT - ASSESSMENT
73 y/o Male w/ PMHx HTN, gout, HLD, Afib on Eliquis, CAD w/ stent, PAD s/p RLE profunda to PT bypass in 3/2014 by Dr. Figueroa/Adriana, CKD III presenting worsening RLE pain and RLE wound that is not healing 71 y/o Male w/ PMHx HTN, gout, HLD, Afib on Eliquis, CAD w/ stent, PAD s/p RLE profunda to PT bypass in 3/2014 by Dr. Figueroa/Adriana, CKD III presenting worsening RLE pain and RLE wound that is not healing along with MARY GRACE.

## 2019-02-26 NOTE — CONSULT NOTE ADULT - SUBJECTIVE AND OBJECTIVE BOX
VASCULAR SURGERY CONSULT NOTE  Attending: Noemi  Service: vascular surgery  Contact: p [phone]    HPI  73 y/o M w/ PMHx HTN, gout, HLD, Afib on Eliquis, CAD w/ stent, PAD s/p RLE profunda to PT bypass in 3/2014 by Dr. Figueroa/Adriana, CKD III presenting worsening RLE pain and RLE wound that is not healing. Pt states he has been having RLE pain that starts at the toes and radiates up to the groin for several months. Patient and wife are poor historians but state that they have seen Dr. Figueroa in the office and the patient has seemingly undergone bilateral LE angiograms as an outpatient +/- angioplasty and stent placement.  Pt saw his cardiologist yesterday morning for a routine check up who stated that he should go to the ED for admission for the RLE pain and non-healing wound. Pt states his extremities have been cold for years, intermittent numbness, sensation intact bilaterally.  Reports that he is able to walk <1 block without developing claudication pain bilaterally and reports RLE rest pain. On Eliquis for Afib, last taken this AM, takes lasix 40 mg daily. His last MERCED/PVR was last year and revealed extensive RLE PAD.     In the ER he was hemodyanmically stable.  On exam his LE were cool to touch bilaterally.  He has a non healing anterior shin wound that is approximately 1.5cm in length with an adjacent punctate wound on the lateral aspect of his shin.  He had a dopplerable left PT pulse but did not have DP/PT signals on the right foot.  He had dopplerable popliteal signals bilaterally and faintly palpable femoral pulses.      PMH/PSH  Gout  PVD (peripheral vascular disease)  CAD (coronary artery disease)  Pneumonia  Atherosclerosis of arteries  Prostate Ca  Dyslipidemia  HTN (Hypertension), Benign  Glaucoma    Stented coronary artery  Peripheral vascular disease  S/P lumbar laminectomy  S/P prostatectomy  Dyslipidemia      MEDICATIONS  Eliquis 5mg BID  Ellipta 100mcg/62.5mg/25 mcg  docusate sodium 100 mg oral capsule: 1 cap(s) orally 3 times a day (09 Apr 2018 15:35)  latanoprost 0.005% ophthalmic solution: 1 drop(s) to each affected eye once a day (at bedtime) (09 Apr 2018 15:35)  metoprolol succinate 50 mg oral tablet, extended release: 1 tab(s) orally once a day (09 Apr 2018 15:35)  pantoprazole 40 mg oral delayed release tablet: 1 tab(s) orally once a day (before a meal) (09 Apr 2018 15:35)  pravastatin 40 mg oral tablet: tab(s) orally once a day in morning (12 Mar 2018 09:38)  senna oral tablet: 2 tab(s) orally once a day (at bedtime) (09 Apr 2018 15:35)  Hydroxyzine 25mg daily         Allergies    No Known Allergies    Intolerances        Social: former smoker, now on nicotine patch    Physical Exam  T(C): 36.4 (02-26-19 @ 02:39), Max: 36.7 (02-25-19 @ 16:14)  HR: 70 (02-26-19 @ 02:39) (70 - 85)  BP: 113/67 (02-26-19 @ 02:39) (100/80 - 113/67)  RR: 17 (02-26-19 @ 02:39) (17 - 18)  SpO2: 100% (02-26-19 @ 02:39) (91% - 100%)  Wt(kg): --  Tmax: T(C): , Max: 36.7 (02-25-19 @ 16:14)  Wt(kg): --      Gen: NAD  Neuro: AAOx3  HEENT: normocephalic, atraumatic, no scleral icterus  CV: S1, S2, RRR  Pulm: CTA B/L  Abd: Soft, ND, NT, no rebound, no guarding, no palpable organomegaly/masses  Ext: LE were cool to touch bilaterally.  He has a non healing anterior shin wound that is approximately 1.5cm in length with an adjacent punctate wound on the lateral aspect of his shin.  He had a dopplerable left PT pulse but did not have DP/PT signals on the right foot.  He had dopplerable popliteal signals bilaterally and faintly palpable femoral pulses.      LABS                        12.5   4.84  )-----------( 249      ( 26 Feb 2019 00:20 )             43.2     02-26    140  |  87<L>  |  39<H>  ----------------------------<  91  3.5   |  36<H>  |  2.37<H>    Ca    9.4      26 Feb 2019 00:20  Phos  4.0     02-26  Mg     1.6     02-26    TPro  6.4  /  Alb  3.7  /  TBili  2.0<H>  /  DBili  x   /  AST  63<H>  /  ALT  33  /  AlkPhos  97  02-26    PT/INR - ( 26 Feb 2019 00:20 )   PT: 55.6 SEC;   INR: 4.77          PTT - ( 26 Feb 2019 00:20 )  PTT:34.9 SEC
[facility]   MD LEONOR SAM MD ANGELA WONG, PA    TEL:  OFFICE: [phone]  DR CHIN CELL: [phone]  DR. GUARDADO CELL: [phone]  AMBER SALINAS CELL: [phone]      HPI:  74 y/o M w/ PMHx HTN, gout, HLD, CKD stage 3, Afib on Eliquis, CAD w/ stent, PAD w/ RLE stent placement, CKD III presenting worsening RLE pain and RLE wound that is not healing. Pt states he has been having RLE pain that starts at the toes and radiates up to the groin. Pt saw his cardiologist in the AM, stated that he should go to the ED for admission for the RLE pain and non-healing wound. Pt states his extremities have been cold for years, intermittent numbness, sensation intact in the LE. On Eliquis for Afib, takes lasix 40 mg daily.  Patient follow up with dr. chin outpatient for ckd. last visit may 2018. baseline scr likely ~1.5    Allergies:  No Known Allergies      PAST MEDICAL & SURGICAL HISTORY:  Gout  PVD (peripheral vascular disease)  CAD (coronary artery disease): stented coronary artery 2001  Pneumonia: childhood, no reoccurance  Atherosclerosis of arteries: right leg.  Prostate Ca  Dyslipidemia  HTN (Hypertension), Benign  Glaucoma  Stented coronary artery: 2001  Peripheral vascular disease: s/p right femoral popliteal bypass about 2 years ago  S/P lumbar laminectomy: 2010, L4 L5  S/P prostatectomy: for prostate cancer, several years ago, no chemo/radiation therapy      Home Medications Reviewed    Hospital Medications:   MEDICATIONS  (STANDING):  buDESOnide 160 MICROgram(s)/formoterol 4.5 MICROgram(s) Inhaler 2 Puff(s) Inhalation two times a day  calcitriol   Capsule 0.25 MICROGram(s) Oral daily  metoprolol succinate ER 50 milliGRAM(s) Oral daily      SOCIAL HISTORY:  recently stopped smoking    FAMILY HISTORY:  Family history of diabetes mellitus (DM) (Child)      REVIEW OF SYSTEMS:  CONSTITUTIONAL: No weakness, fevers or chills  EYES/ENT: No visual changes;  No vertigo or throat pain   NECK: No pain or stiffness  RESPIRATORY: No cough, wheezing, hemoptysis; No shortness of breath  CARDIOVASCULAR: No chest pain or palpitations.  GASTROINTESTINAL: No abdominal or epigastric pain. No nausea, vomiting, or hematemesis; No diarrhea or constipation. No melena or hematochezia.  GENITOURINARY: No dysuria, frequency, foamy urine, urinary urgency, incontinence or hematuria  NEUROLOGICAL: No numbness or weakness  SKIN: No itching, burning, rashes, or lesions   VASCULAR: see HPI  All other review of systems is negative unless indicated above.    VITALS:  T(F): 97.3 (02-26-19 @ 10:05), Max: 98.1 (02-25-19 @ 16:14)  HR: 73 (02-26-19 @ 10:05)  BP: 134/88 (02-26-19 @ 10:05)  RR: 16 (02-26-19 @ 10:05)  SpO2: 97% (02-26-19 @ 10:05)  Wt(kg): --    Height (cm): 177.8 (02-26 @ 10:05)  Weight (kg): 81.2 (02-26 @ 10:05)  BMI (kg/m2): 25.7 (02-26 @ 10:05)  BSA (m2): 1.99 (02-26 @ 10:05)    PHYSICAL EXAM:  Constitutional: NAD  HEENT: anicteric sclera, oropharynx clear, MMM  Neck: No JVD  Respiratory: CTAB, no wheezes, rales or rhonchi  Cardiovascular: S1, S2, RRR  Gastrointestinal: BS+, soft, NT/ND  Extremities: 1+peripheral edema, extreme tenderness of right foot  Neurological: A/O x 3, no focal deficits  Psychiatric: Normal mood, normal affect  : No CVA tenderness. No pham.   Skin: b/l venous status changes LE.       LABS:  02-26    140  |  87<L>  |  39<H>  ----------------------------<  91  3.5   |  36<H>  |  2.37<H>    Ca    9.4      26 Feb 2019 00:20  Phos  4.0     02-26  Mg     1.6     02-26    TPro  6.4  /  Alb  3.7  /  TBili  2.0<H>  /  DBili      /  AST  63<H>  /  ALT  33  /  AlkPhos  97  02-26    Creatinine Trend: 2.37 <--                        12.5   4.84  )-----------( 249      ( 26 Feb 2019 00:20 )             43.2     Urine Studies:        RADIOLOGY & ADDITIONAL STUDIES:

## 2019-02-26 NOTE — H&P ADULT - NSHPREVIEWOFSYSTEMS_GEN_ALL_CORE
REVIEW OF SYSTEMS:    CONSTITUTIONAL: (-) dizziness (-) weakness (-) fevers (-) chills (-) weight loss (-) weight gain (-) sweats (-) poor appetite (-) falls  HEENT: (-) visual changes (-) HA (-) vertigo (-) rhinorrhea (-) epistaxis (-) swelling (-) hearing changes (-) sore throat (-) hoarseness  NECK: (-) stiffness (-) pain  RESPIRATORY: (-) cough (-) SOB (-) ROMEO (-) hemoptysis   CARDIOVASCULAR: (-) chest pain (-) palpitations (-) PND (-) orthopnea  GASTROINTESTINAL: (-) abd pain (-) nausea (-) vomiting (-) diarrhea (-) constipation (-) melena (-) BRBPR (-) dysphagia (-) odynophagia (-) incontinence (-) bloatedness  GENITOURINARY: (-) dysuria  (-) frequency (-) hematuria (-) incontinence (-) abnormal discharge (-) incomplete emptying  NEUROLOGICAL: (-) confusion (-) slurred speech (-) focal weakness (-) tingling/numbness (-) difficulty walking (-) tremors  MUSCULOSKELETAL: (-) back pain (-) joint pain (-) joint swelling (-) reduced ROM (+) extremity pain (-) extremity swelling  PSYCH: (-) anxious (-) depressed (-) aural hallucinations (-) visual hallucinations  SKIN: (+) itching (-) burning (-) rashes (-) swelling (-) bruising (-) pain  All other review of systems is negative unless indicated above.

## 2019-02-26 NOTE — H&P ADULT - NSHPPHYSICALEXAM_GEN_ALL_CORE
GENERAL: NAD  HEAD:  Atraumatic, Normocephalic  EYES: EOMI, PERRLA, conjunctiva and sclera clear  MOUTH/THROAT: no swelling, no erythema, no lesions, no exudates  NECK: Supple, No JVD, No LAD  CHEST/LUNG: Clear to auscultation bilaterally; No wheezes or rales  HEART: Regular rate and rhythm; nl S1/S2; No murmurs, rubs, or gallops  ABDOMEN: Soft, Nontender, Nondistended; Bowel sounds present  EXTREMITIES:  hard to appreciate DP/PT on his right; 1+ b/l LE edema  SKIN: small oval-shaped dry healing wound on his anterior shin.  Smaller < 1 cm wide dry healing wound on his rt lateral calf.  There is no evidence of necrosis, drainage or malodor at either site  NEURO: A+O x 3; nonfocal CN/motor/sensory/reflexes

## 2019-02-26 NOTE — H&P ADULT - HISTORY OF PRESENT ILLNESS
71 y/o Male w/ PMHx HTN, gout, HLD, Afib on Eliquis, CAD w/ stent, PAD s/p RLE profunda to PT bypass in 3/2014 by Dr. Figueroa/Adriana, CKD III presenting worsening RLE pain and RLE wound that is not healing. Pt states he has been having RLE pain that starts at the toes and radiates up to the groin. Pt saw his cardiologist earlier in the day, who stated that he should go to the ED for admission for the RLE pain and non-healing wound. Pt states his extremities have been cold for years, intermittent numbness, sensation intact in the LE.  He reports that the intensity of his pain isn't any worse than when he last followed up with his vascular surgeon; nevertheless, he is uncomfortable enough that he was agreeable to come to the hospital.

## 2019-02-26 NOTE — H&P ADULT - NSHPLABSRESULTS_GEN_ALL_CORE
LABS:                        12.5   4.84  )-----------( 249      ( 26 Feb 2019 00:20 )             43.2     02-26    140  |  87<L>  |  39<H>  ----------------------------<  91  3.5   |  36<H>  |  2.37<H>    Ca    9.4      26 Feb 2019 00:20  Phos  4.0     02-26  Mg     1.6     02-26    TPro  6.4  /  Alb  3.7  /  TBili  2.0<H>  /  DBili  x   /  AST  63<H>  /  ALT  33  /  AlkPhos  97  02-26    PT/INR - ( 26 Feb 2019 00:20 )   PT: 55.6 SEC;   INR: 4.77          PTT - ( 26 Feb 2019 00:20 )  PTT:34.9 SEC  CAPILLARY BLOOD GLUCOSE      POCT Blood Glucose.: 82 mg/dL (26 Feb 2019 12:53)

## 2019-02-27 LAB
ALBUMIN SERPL ELPH-MCNC: 3.4 G/DL — SIGNIFICANT CHANGE UP (ref 3.3–5)
ALP SERPL-CCNC: 96 U/L — SIGNIFICANT CHANGE UP (ref 40–120)
ALT FLD-CCNC: 36 U/L — SIGNIFICANT CHANGE UP (ref 4–41)
ANION GAP SERPL CALC-SCNC: 14 MMO/L — SIGNIFICANT CHANGE UP (ref 7–14)
AST SERPL-CCNC: 61 U/L — HIGH (ref 4–40)
BASOPHILS # BLD AUTO: 0.05 K/UL — SIGNIFICANT CHANGE UP (ref 0–0.2)
BASOPHILS NFR BLD AUTO: 1.2 % — SIGNIFICANT CHANGE UP (ref 0–2)
BILIRUB SERPL-MCNC: 1.6 MG/DL — HIGH (ref 0.2–1.2)
BUN SERPL-MCNC: 36 MG/DL — HIGH (ref 7–23)
CALCIUM SERPL-MCNC: 9.4 MG/DL — SIGNIFICANT CHANGE UP (ref 8.4–10.5)
CHLORIDE SERPL-SCNC: 92 MMOL/L — LOW (ref 98–107)
CO2 SERPL-SCNC: 36 MMOL/L — HIGH (ref 22–31)
CREAT SERPL-MCNC: 1.98 MG/DL — HIGH (ref 0.5–1.3)
EOSINOPHIL # BLD AUTO: 0.05 K/UL — SIGNIFICANT CHANGE UP (ref 0–0.5)
EOSINOPHIL NFR BLD AUTO: 1.2 % — SIGNIFICANT CHANGE UP (ref 0–6)
GLUCOSE SERPL-MCNC: 86 MG/DL — SIGNIFICANT CHANGE UP (ref 70–99)
HCT VFR BLD CALC: 43.5 % — SIGNIFICANT CHANGE UP (ref 39–50)
HGB BLD-MCNC: 12.9 G/DL — LOW (ref 13–17)
IMM GRANULOCYTES NFR BLD AUTO: 0.5 % — SIGNIFICANT CHANGE UP (ref 0–1.5)
INR BLD: 2.38 — HIGH (ref 0.88–1.17)
LYMPHOCYTES # BLD AUTO: 0.93 K/UL — LOW (ref 1–3.3)
LYMPHOCYTES # BLD AUTO: 21.8 % — SIGNIFICANT CHANGE UP (ref 13–44)
MCHC RBC-ENTMCNC: 24.7 PG — LOW (ref 27–34)
MCHC RBC-ENTMCNC: 29.7 % — LOW (ref 32–36)
MCV RBC AUTO: 83.3 FL — SIGNIFICANT CHANGE UP (ref 80–100)
MONOCYTES # BLD AUTO: 0.71 K/UL — SIGNIFICANT CHANGE UP (ref 0–0.9)
MONOCYTES NFR BLD AUTO: 16.7 % — HIGH (ref 2–14)
NEUTROPHILS # BLD AUTO: 2.5 K/UL — SIGNIFICANT CHANGE UP (ref 1.8–7.4)
NEUTROPHILS NFR BLD AUTO: 58.6 % — SIGNIFICANT CHANGE UP (ref 43–77)
NRBC # FLD: 0 K/UL — LOW (ref 25–125)
PLATELET # BLD AUTO: 223 K/UL — SIGNIFICANT CHANGE UP (ref 150–400)
PMV BLD: 11.5 FL — SIGNIFICANT CHANGE UP (ref 7–13)
POTASSIUM SERPL-MCNC: 3.5 MMOL/L — SIGNIFICANT CHANGE UP (ref 3.5–5.3)
POTASSIUM SERPL-SCNC: 3.5 MMOL/L — SIGNIFICANT CHANGE UP (ref 3.5–5.3)
PROT SERPL-MCNC: 5.9 G/DL — LOW (ref 6–8.3)
PROTHROM AB SERPL-ACNC: 27.1 SEC — HIGH (ref 9.8–13.1)
RBC # BLD: 5.22 M/UL — SIGNIFICANT CHANGE UP (ref 4.2–5.8)
RBC # FLD: 23.5 % — HIGH (ref 10.3–14.5)
SODIUM SERPL-SCNC: 142 MMOL/L — SIGNIFICANT CHANGE UP (ref 135–145)
WBC # BLD: 4.26 K/UL — SIGNIFICANT CHANGE UP (ref 3.8–10.5)
WBC # FLD AUTO: 4.26 K/UL — SIGNIFICANT CHANGE UP (ref 3.8–10.5)

## 2019-02-27 PROCEDURE — 76770 US EXAM ABDO BACK WALL COMP: CPT | Mod: 26

## 2019-02-27 RX ADMIN — TRAMADOL HYDROCHLORIDE 50 MILLIGRAM(S): 50 TABLET ORAL at 11:09

## 2019-02-27 RX ADMIN — TRAMADOL HYDROCHLORIDE 50 MILLIGRAM(S): 50 TABLET ORAL at 23:42

## 2019-02-27 RX ADMIN — BUDESONIDE AND FORMOTEROL FUMARATE DIHYDRATE 2 PUFF(S): 160; 4.5 AEROSOL RESPIRATORY (INHALATION) at 10:56

## 2019-02-27 RX ADMIN — BUDESONIDE AND FORMOTEROL FUMARATE DIHYDRATE 2 PUFF(S): 160; 4.5 AEROSOL RESPIRATORY (INHALATION) at 23:42

## 2019-02-27 RX ADMIN — CALCITRIOL 0.25 MICROGRAM(S): 0.5 CAPSULE ORAL at 12:47

## 2019-02-27 RX ADMIN — CLOPIDOGREL BISULFATE 75 MILLIGRAM(S): 75 TABLET, FILM COATED ORAL at 12:47

## 2019-02-27 RX ADMIN — LATANOPROST 1 DROP(S): 0.05 SOLUTION/ DROPS OPHTHALMIC; TOPICAL at 23:43

## 2019-02-27 RX ADMIN — Medication 25 MILLIGRAM(S): at 18:17

## 2019-02-27 RX ADMIN — TRAMADOL HYDROCHLORIDE 50 MILLIGRAM(S): 50 TABLET ORAL at 11:39

## 2019-02-27 RX ADMIN — ATORVASTATIN CALCIUM 10 MILLIGRAM(S): 80 TABLET, FILM COATED ORAL at 23:42

## 2019-02-27 NOTE — PROGRESS NOTE ADULT - PROBLEM SELECTOR PLAN 1
Appreciate vascular input  Given MARY GRACE, and chronicity of his symptoms, unlikely that there is an emergent indication to do LE angiogram  Cont statin, plavix  Rt arterial duplex shows thrombosis of bypass, low-velocity flow reconstitutes at the right poplitealartery. Vascular team notified. vascular f/u for further plan

## 2019-02-27 NOTE — CHART NOTE - NSCHARTNOTEFT_GEN_A_CORE
Right arterial duplex to assess patency of bypass- Right lower extremity arterial bypass graft appearscompletely thrombosed. Low-velocity flow reconstitutes at the right poplitealartery.     vascular sx aware of results and d/w attending, will follow up vascular decision/recs

## 2019-02-27 NOTE — PROGRESS NOTE ADULT - PROBLEM SELECTOR PLAN 1
? etiology. possible contrast induced vs prerenal. (per wife patient had two peripheral angiogram. one in jan, last one feb 6)  FEna<1%,   currently lasix on hold  renal function is improving   monitor bmp  avoid nephrotoxic agents.    ** pt has 57% risk of contrast induced nephropathy, 12.6% risk of RRT. please give mucomyst 1200 bid for 4 doses start 1 day prior to test, last dose after test, sodium bicarb 150meq/L in sterile water at 200cc/hr x 1 hr 1hr prior to test then 75cc/hr x 6 hr after.  risk and benefit discussed with patient and wife.

## 2019-02-28 DIAGNOSIS — E87.6 HYPOKALEMIA: ICD-10-CM

## 2019-02-28 DIAGNOSIS — E87.3 ALKALOSIS: ICD-10-CM

## 2019-02-28 LAB
ALBUMIN SERPL ELPH-MCNC: 3.3 G/DL — SIGNIFICANT CHANGE UP (ref 3.3–5)
ALP SERPL-CCNC: 92 U/L — SIGNIFICANT CHANGE UP (ref 40–120)
ALT FLD-CCNC: 27 U/L — SIGNIFICANT CHANGE UP (ref 4–41)
ANION GAP SERPL CALC-SCNC: 11 MMO/L — SIGNIFICANT CHANGE UP (ref 7–14)
AST SERPL-CCNC: 41 U/L — HIGH (ref 4–40)
BILIRUB SERPL-MCNC: 1.2 MG/DL — SIGNIFICANT CHANGE UP (ref 0.2–1.2)
BUN SERPL-MCNC: 32 MG/DL — HIGH (ref 7–23)
CALCIUM SERPL-MCNC: 9 MG/DL — SIGNIFICANT CHANGE UP (ref 8.4–10.5)
CHLORIDE SERPL-SCNC: 93 MMOL/L — LOW (ref 98–107)
CO2 SERPL-SCNC: 37 MMOL/L — HIGH (ref 22–31)
CREAT SERPL-MCNC: 1.79 MG/DL — HIGH (ref 0.5–1.3)
GLUCOSE SERPL-MCNC: 114 MG/DL — HIGH (ref 70–99)
HCT VFR BLD CALC: 39.5 % — SIGNIFICANT CHANGE UP (ref 39–50)
HGB BLD-MCNC: 11.8 G/DL — LOW (ref 13–17)
INR BLD: 1.84 — HIGH (ref 0.88–1.17)
MCHC RBC-ENTMCNC: 24.7 PG — LOW (ref 27–34)
MCHC RBC-ENTMCNC: 29.9 % — LOW (ref 32–36)
MCV RBC AUTO: 82.8 FL — SIGNIFICANT CHANGE UP (ref 80–100)
NRBC # FLD: 0 K/UL — LOW (ref 25–125)
PLATELET # BLD AUTO: 219 K/UL — SIGNIFICANT CHANGE UP (ref 150–400)
PMV BLD: 11.8 FL — SIGNIFICANT CHANGE UP (ref 7–13)
POTASSIUM SERPL-MCNC: 3.4 MMOL/L — LOW (ref 3.5–5.3)
POTASSIUM SERPL-SCNC: 3.4 MMOL/L — LOW (ref 3.5–5.3)
PROT SERPL-MCNC: 5.7 G/DL — LOW (ref 6–8.3)
PROTHROM AB SERPL-ACNC: 21.4 SEC — HIGH (ref 9.8–13.1)
RBC # BLD: 4.77 M/UL — SIGNIFICANT CHANGE UP (ref 4.2–5.8)
RBC # FLD: 23.1 % — HIGH (ref 10.3–14.5)
SODIUM SERPL-SCNC: 141 MMOL/L — SIGNIFICANT CHANGE UP (ref 135–145)
WBC # BLD: 3.7 K/UL — LOW (ref 3.8–10.5)
WBC # FLD AUTO: 3.7 K/UL — LOW (ref 3.8–10.5)

## 2019-02-28 RX ORDER — BACITRACIN ZINC 500 UNIT/G
1 OINTMENT IN PACKET (EA) TOPICAL
Qty: 0 | Refills: 0 | Status: DISCONTINUED | OUTPATIENT
Start: 2019-02-28 | End: 2019-02-28

## 2019-02-28 RX ORDER — GABAPENTIN 400 MG/1
100 CAPSULE ORAL
Qty: 0 | Refills: 0 | Status: DISCONTINUED | OUTPATIENT
Start: 2019-02-28 | End: 2019-03-01

## 2019-02-28 RX ORDER — POTASSIUM CHLORIDE 20 MEQ
40 PACKET (EA) ORAL ONCE
Qty: 0 | Refills: 0 | Status: COMPLETED | OUTPATIENT
Start: 2019-02-28 | End: 2019-02-28

## 2019-02-28 RX ORDER — BACITRACIN ZINC 500 UNIT/G
1 OINTMENT IN PACKET (EA) TOPICAL
Qty: 0 | Refills: 0 | Status: DISCONTINUED | OUTPATIENT
Start: 2019-02-28 | End: 2019-03-01

## 2019-02-28 RX ORDER — TRAMADOL HYDROCHLORIDE 50 MG/1
50 TABLET ORAL EVERY 6 HOURS
Qty: 0 | Refills: 0 | Status: DISCONTINUED | OUTPATIENT
Start: 2019-02-28 | End: 2019-03-01

## 2019-02-28 RX ADMIN — CALCITRIOL 0.25 MICROGRAM(S): 0.5 CAPSULE ORAL at 12:38

## 2019-02-28 RX ADMIN — LATANOPROST 1 DROP(S): 0.05 SOLUTION/ DROPS OPHTHALMIC; TOPICAL at 21:07

## 2019-02-28 RX ADMIN — GABAPENTIN 100 MILLIGRAM(S): 400 CAPSULE ORAL at 18:35

## 2019-02-28 RX ADMIN — BUDESONIDE AND FORMOTEROL FUMARATE DIHYDRATE 2 PUFF(S): 160; 4.5 AEROSOL RESPIRATORY (INHALATION) at 21:07

## 2019-02-28 RX ADMIN — Medication 25 MILLIGRAM(S): at 06:49

## 2019-02-28 RX ADMIN — CLOPIDOGREL BISULFATE 75 MILLIGRAM(S): 75 TABLET, FILM COATED ORAL at 12:38

## 2019-02-28 RX ADMIN — Medication 40 MILLIEQUIVALENT(S): at 12:38

## 2019-02-28 RX ADMIN — Medication 25 MILLIGRAM(S): at 15:29

## 2019-02-28 RX ADMIN — TRAMADOL HYDROCHLORIDE 50 MILLIGRAM(S): 50 TABLET ORAL at 00:00

## 2019-02-28 RX ADMIN — BUDESONIDE AND FORMOTEROL FUMARATE DIHYDRATE 2 PUFF(S): 160; 4.5 AEROSOL RESPIRATORY (INHALATION) at 12:38

## 2019-02-28 RX ADMIN — ATORVASTATIN CALCIUM 10 MILLIGRAM(S): 80 TABLET, FILM COATED ORAL at 21:07

## 2019-02-28 NOTE — PROGRESS NOTE ADULT - PROBLEM SELECTOR PLAN 1
Appreciate vascular input  Given MARY GRACE, and chronicity of his symptoms, unlikely that there is an emergent indication to do LE angiogram  Cont statin, plavix  Rt arterial duplex shows thrombosis of bypass, low-velocity flow reconstitutes at the right poplitealartery. Vascular team notified. vascular f/u for further plan  f/u requested again today by NP/PA Lucia Appreciate vascular input  Given MARY GRACE, and chronicity of his symptoms, unlikely that there is an emergent indication to do LE angiogram  Cont statin, plavix  Rt arterial duplex shows thrombosis of bypass, low-velocity flow reconstitutes at the right poplitealartery. Vascular team notified. vascular f/u for further plan  f/u requested again today by NP/PA Lucia  His pain is neuropathic in nature as well with pins and needles, and shooting up, spasms. Will start low dose gabapentin 100 mg BID  c/w PRN Tramadol

## 2019-02-28 NOTE — PROGRESS NOTE ADULT - PROBLEM SELECTOR PLAN 1
? etiology. possible contrast induced vs prerenal. (per wife patient had two peripheral angiogram. one in jan, last one feb 6)  FEna<1%,   currently lasix on hold  renal function is improving   monitor bmp  avoid nephrotoxic agents.    ** pt has 57% risk of contrast induced nephropathy, 12.6% risk of RRT. please give mucomyst 1200 bid for 4 doses start 1 day prior to test, last dose after test, NS @ 75cc/hr x6 hrs before and 6hrs after  risk and benefit discussed with patient and wife.

## 2019-02-28 NOTE — PROGRESS NOTE ADULT - ATTENDING COMMENTS
- Dr. ADINA King ([facility])  - [phone]
- Dr. ADINA King ([facility])  - [phone]

## 2019-02-28 NOTE — PROGRESS NOTE ADULT - PROBLEM SELECTOR PLAN 2
With MARY GRACE. improving Cr  Monitor BMP daily  Strict I/Os  No NSAIDS or IV contrast  Holding Lasix for now  renal f/u appreciated

## 2019-02-28 NOTE — PROGRESS NOTE ADULT - PROBLEM SELECTOR PLAN 9
INR supratherapeutic  Will resume eliquis when INR below 3.00
INR supratherapeutic  Will resume Eliquis if vascular has no intervention planned.

## 2019-02-28 NOTE — CHART NOTE - NSCHARTNOTEFT_GEN_A_CORE
Patient is a 73y old  Male who presents with a chief complaint of rt leg pain (28 Feb 2019 11:37)      VA duplex showing that RLE graft completely thrombosed, called vascular back again today to touch base about plan, still waiting for attending decision on plan of action. d/w Dr King and will follow up vascular recs.

## 2019-03-01 ENCOUNTER — TRANSCRIPTION ENCOUNTER (OUTPATIENT)
Age: 74
End: 2019-03-01

## 2019-03-01 VITALS — WEIGHT: 179.02 LBS

## 2019-03-01 LAB
ANION GAP SERPL CALC-SCNC: 16 MMO/L — HIGH (ref 7–14)
BUN SERPL-MCNC: 30 MG/DL — HIGH (ref 7–23)
CALCIUM SERPL-MCNC: 9.4 MG/DL — SIGNIFICANT CHANGE UP (ref 8.4–10.5)
CHLORIDE SERPL-SCNC: 93 MMOL/L — LOW (ref 98–107)
CO2 SERPL-SCNC: 32 MMOL/L — HIGH (ref 22–31)
CREAT SERPL-MCNC: 1.68 MG/DL — HIGH (ref 0.5–1.3)
GLUCOSE SERPL-MCNC: 86 MG/DL — SIGNIFICANT CHANGE UP (ref 70–99)
HCT VFR BLD CALC: 42.9 % — SIGNIFICANT CHANGE UP (ref 39–50)
HGB BLD-MCNC: 12.7 G/DL — LOW (ref 13–17)
MCHC RBC-ENTMCNC: 25.3 PG — LOW (ref 27–34)
MCHC RBC-ENTMCNC: 29.6 % — LOW (ref 32–36)
MCV RBC AUTO: 85.5 FL — SIGNIFICANT CHANGE UP (ref 80–100)
NRBC # FLD: 0 K/UL — LOW (ref 25–125)
PLATELET # BLD AUTO: 230 K/UL — SIGNIFICANT CHANGE UP (ref 150–400)
PMV BLD: 11.8 FL — SIGNIFICANT CHANGE UP (ref 7–13)
POTASSIUM SERPL-MCNC: 4.3 MMOL/L — SIGNIFICANT CHANGE UP (ref 3.5–5.3)
POTASSIUM SERPL-SCNC: 4.3 MMOL/L — SIGNIFICANT CHANGE UP (ref 3.5–5.3)
RBC # BLD: 5.02 M/UL — SIGNIFICANT CHANGE UP (ref 4.2–5.8)
RBC # FLD: 23.1 % — HIGH (ref 10.3–14.5)
SODIUM SERPL-SCNC: 141 MMOL/L — SIGNIFICANT CHANGE UP (ref 135–145)
WBC # BLD: 3.49 K/UL — LOW (ref 3.8–10.5)
WBC # FLD AUTO: 3.49 K/UL — LOW (ref 3.8–10.5)

## 2019-03-01 RX ORDER — ACETAMINOPHEN 500 MG
2 TABLET ORAL
Qty: 0 | Refills: 0 | COMMUNITY
Start: 2019-03-01

## 2019-03-01 RX ORDER — APIXABAN 2.5 MG/1
5 TABLET, FILM COATED ORAL EVERY 12 HOURS
Qty: 0 | Refills: 0 | Status: DISCONTINUED | OUTPATIENT
Start: 2019-03-01 | End: 2019-03-01

## 2019-03-01 RX ORDER — CLOPIDOGREL BISULFATE 75 MG/1
1 TABLET, FILM COATED ORAL
Qty: 30 | Refills: 0 | OUTPATIENT
Start: 2019-03-01 | End: 2019-03-30

## 2019-03-01 RX ORDER — FUROSEMIDE 40 MG
1 TABLET ORAL
Qty: 30 | Refills: 0 | OUTPATIENT
Start: 2019-03-01 | End: 2019-03-30

## 2019-03-01 RX ORDER — GABAPENTIN 400 MG/1
1 CAPSULE ORAL
Qty: 60 | Refills: 0 | OUTPATIENT
Start: 2019-03-01 | End: 2019-03-30

## 2019-03-01 RX ADMIN — BUDESONIDE AND FORMOTEROL FUMARATE DIHYDRATE 2 PUFF(S): 160; 4.5 AEROSOL RESPIRATORY (INHALATION) at 11:19

## 2019-03-01 RX ADMIN — GABAPENTIN 100 MILLIGRAM(S): 400 CAPSULE ORAL at 05:44

## 2019-03-01 RX ADMIN — Medication 1 APPLICATION(S): at 11:24

## 2019-03-01 RX ADMIN — CALCITRIOL 0.25 MICROGRAM(S): 0.5 CAPSULE ORAL at 11:24

## 2019-03-01 RX ADMIN — APIXABAN 5 MILLIGRAM(S): 2.5 TABLET, FILM COATED ORAL at 11:24

## 2019-03-01 RX ADMIN — TRAMADOL HYDROCHLORIDE 50 MILLIGRAM(S): 50 TABLET ORAL at 08:43

## 2019-03-01 RX ADMIN — Medication 650 MILLIGRAM(S): at 11:24

## 2019-03-01 RX ADMIN — CLOPIDOGREL BISULFATE 75 MILLIGRAM(S): 75 TABLET, FILM COATED ORAL at 11:24

## 2019-03-01 RX ADMIN — Medication 650 MILLIGRAM(S): at 11:54

## 2019-03-01 RX ADMIN — TRAMADOL HYDROCHLORIDE 50 MILLIGRAM(S): 50 TABLET ORAL at 09:13

## 2019-03-01 RX ADMIN — Medication 1 APPLICATION(S): at 05:44

## 2019-03-01 NOTE — CHART NOTE - NSCHARTNOTEFT_GEN_A_CORE
No further inpatient vascular surgery interventions at this time.  Patient can follow-up with Dr. Figueroa as outpatient.  Recommend wound care to RLE: silvadene and wrap in kerlix    C team - Vascular  [id number] No further inpatient vascular surgery interventions at this time.  Patient can follow-up with Dr. Figueroa as outpatient.  Recommend wound care to RLE: silvadene and wrap in kerlix    C team - Vascular  [id number]    [facility] Attd:  Asked by Dr. Figueroa to check on pt.  h/o PAD.  LE mult revascs  No rest pain  R ant shin superf clean ulcer- improving per pt/wife.  Feet/toes warm, skin intact    Rec:   cont med mgmt  R shin ulcer care: QD cleansing and Silvadene cream- cover w dry gauze.  Foot care/protection/podiatry fu.    Pt will fu w Dr. Figueroa p DC

## 2019-03-01 NOTE — PROGRESS NOTE ADULT - ASSESSMENT
74 y/o M w/ PMHx HTN, gout, HLD, Afib on Eliquis, CAD w/ stent, PAD w/ RLE stent placement, CKD III presenting worsening RLE pain and RLE wound that is not healing. Lab also noted to have acute on ckd
74 y/o M w/ PMHx HTN, gout, HLD, Afib on Eliquis, CAD w/ stent, PAD w/ RLE stent placement, CKD III presenting worsening RLE pain and RLE wound that is not healing. Lab also noted to have acute on ckd
71 y/o Male w/ PMHx HTN, gout, HLD, Afib on Eliquis, CAD w/ stent, PAD s/p RLE profunda to PT bypass in 3/2014 by Dr. Figueroa/Adriana, CKD III presenting worsening RLE pain and RLE wound that is not healing along with MARY GRACE.
73 y/o Male w/ PMHx HTN, gout, HLD, Afib on Eliquis, CAD w/ stent, PAD s/p RLE profunda to PT bypass in 3/2014 by Dr. Figueroa/Adriana, CKD III presenting worsening RLE pain and RLE wound that is not healing along with MARY GRACE.
72 y/o M w/ PMHx HTN, gout, HLD, Afib on Eliquis, CAD w/ stent, PAD w/ RLE stent placement, CKD III presenting worsening RLE pain and RLE wound that is not healing. Lab also noted to have acute on ckd

## 2019-03-01 NOTE — PROGRESS NOTE ADULT - PROBLEM SELECTOR PROBLEM 2
Stage 3 chronic kidney disease

## 2019-03-01 NOTE — PROGRESS NOTE ADULT - PROBLEM SELECTOR PROBLEM 4
PVD (peripheral vascular disease)
PVD (peripheral vascular disease)
MARY GRACE (acute kidney injury)
MARY GRACE (acute kidney injury)
PVD (peripheral vascular disease)

## 2019-03-01 NOTE — PROGRESS NOTE ADULT - PROBLEM SELECTOR PLAN 4
f/u vascular.
f/u vascular.
Monitor BMP daily  Strict I/Os  No NSAIDS or IV contrast  Holding lasix
Monitor BMP daily  Strict I/Os  No NSAIDS or IV contrast  Holding lasix
f/u vascular.

## 2019-03-01 NOTE — DISCHARGE NOTE ADULT - SECONDARY DIAGNOSIS.
Chronic kidney disease-mineral and bone disorder HTN (Hypertension), Benign MARY GRACE (acute kidney injury) CAD (coronary artery disease) Afib Dyslipidemia

## 2019-03-01 NOTE — DISCHARGE NOTE ADULT - HOSPITAL COURSE
71 y/o M w/ PMHx HTN, gout, HLD, Afib on Eliquis, CAD w/ stent, PAD w/ RLE stent placement, CKD III presenting worsening RLE pain and RLE wound that is not healing    PVD  -Vascular following   -Given MARY GRACE, and chronicity of his symptoms, unlikely that there is an emergent indication to do LE angiogram  -Cont statin, plavix  -Right arterial duplex to assess patency of bypass- Right lower extremity arterial bypass graft appearscompletely thrombosed. Low-velocity flow reconstitutes at the right poplitealartery.   -No further inpatient vascular surgery interventions at this time.  -Patient can follow-up with Dr. Figueroa as outpatient.  -Recommend wound care to RLE: silvadene and wrap in kerlix    Stage 3 chronic kidney disease.  -renal Following   -baseline ~ 1.5  -CKD likely sec to HTN.  -Monitor BMP daily  -Strict I/Os  -No NSAIDS or IV contrast  -Holding lasix.   -US renal- No hydronephrosis. Small ascites.   -monitor renal function  -avoid nephrotoxics, NSAIDS RCA.     HTN  -Continue metoprolol succinate 25 mg daily.     MARY GRACE  -Renal Following   -Monitor BMP daily  -Strict I/Os  -No NSAIDS or IV contrast  -Holding lasix.   -improved    CAD  -Cont metoprolol succinate 25 mg PO daily  -Cont plavix  -Cont statin.     Afib  -Cont metoprolol succinate 25 mg PO daily  -Eliquis on hold 2' elevated INR: resolved   -3/1 cont Eliquis 5mg p.o. BID     Dyslipidemia  -Cont statin.     Glaucoma  -Cont latanoprost.     Hypokalemia  -supplemented today  -monitor.  -resolved     Alkalosis  -? etiology   -possible contraction alkalosis  -lasix on hold. seems euvolemic on exam. given poor EF  -will hold off IVF for now and continue to monitor   -resolved     DVT PPX  -INR supratherapeutic: resolved   -3/1: Continue eliquis 5mg p.o.BID       Dispo: Home 71 y/o M w/ PMHx HTN, gout, HLD, Afib on Eliquis, CAD w/ stent, PAD w/ RLE stent placement, CKD III presenting worsening RLE pain and RLE wound that is not healing    PVD  -Vascular following   -Given MARY GRACE, and chronicity of his symptoms, unlikely that there is an emergent indication to do LE angiogram  -Cont statin, plavix  -Right arterial duplex to assess patency of bypass- Right lower extremity arterial bypass graft appearscompletely thrombosed. Low-velocity flow reconstitutes at the right poplitealartery.   -No further inpatient vascular surgery interventions at this time.  -Patient can follow-up with Dr. Figueroa as outpatient.  -Recommend wound care to RLE: silvadene and wrap in kerlix daily   -Foot care/protection/podiatry follow up     Stage 3 chronic kidney disease.  -renal Following   -baseline ~ 1.5  -CKD likely sec to HTN.  -Monitor BMP daily  -Strict I/Os  -No NSAIDS or IV contrast  -Holding lasix.   -US renal- No hydronephrosis. Small ascites.   -monitor renal function  -avoid nephrotoxics, NSAIDS RCA.   Please follow up with Dr. Vazquez Nephrologist within 1 week of discharge.      HTN  -Continue metoprolol succinate 25 mg daily.     MARY GRACE  -Renal Following   -Monitor BMP daily  -Strict I/Os  -No NSAIDS or IV contrast  -Holding lasix.   -improved    CAD  -Cont metoprolol succinate 25 mg PO daily  -Cont statin.     Afib  -Cont metoprolol succinate 25 mg PO daily  -Eliquis on hold 2' elevated INR: resolved   -3/1 cont Eliquis 5mg p.o. BID     Dyslipidemia  -Cont statin.     Glaucoma  -Cont latanoprost.     Hypokalemia  -supplemented today  -monitor.  -resolved     DVT PPX  -INR supratherapeutic: resolved   -3/1: Continue eliquis 5mg p.o.BID       Dispo: Home

## 2019-03-01 NOTE — PROGRESS NOTE ADULT - PROBLEM SELECTOR PLAN 1
? etiology. possible contrast induced vs prerenal. (per wife patient had two peripheral angiogram. one in jan, last one feb 6)  FEna<1%,   currently lasix on hold, however LE seems to be more swollen, given patient with low EF, will recommended to start lasix 20mg daily   renal function is improving   monitor bmp  avoid nephrotoxic agents.    ** pt has 57% risk of contrast induced nephropathy, 12.6% risk of RRT. please give mucomyst 1200 bid for 4 doses start 1 day prior to test, last dose after test, NS @ 75cc/hr x6 hrs before and 6hrs after  risk and benefit discussed with patient and wife.

## 2019-03-01 NOTE — PROGRESS NOTE ADULT - PROBLEM SELECTOR PROBLEM 3
HTN (Hypertension), Benign

## 2019-03-01 NOTE — PROGRESS NOTE ADULT - PROBLEM SELECTOR PROBLEM 1
MARY GRACE (acute kidney injury)
MARY GRACE (acute kidney injury)
PVD (peripheral vascular disease)
PVD (peripheral vascular disease)
MARY GRACE (acute kidney injury)

## 2019-03-01 NOTE — CHART NOTE - NSCHARTNOTEFT_GEN_A_CORE
D/w pharmacy ok to continue pt on eliquis 5mg p.o. BID although pts cr is 1.5 .  pt is above 60kg and less than 80 years.  D/w medical attending ok to continue on eleiquis 5mg p.o. BID.  No need to continue plavix.  Ok to dc pt home.  d/w medical attending

## 2019-03-01 NOTE — DISCHARGE NOTE ADULT - PATIENT PORTAL LINK FT
You can access the [facility] Patient Portal, offered by [facility], by registering with the following website: [email or web address]

## 2019-03-01 NOTE — PROGRESS NOTE ADULT - PROBLEM SELECTOR PLAN 7
? etiology   possible contraction alkalosis  lasix on hold. better today, consider restart lasix 20 daily
? etiology   possible contraction alkalosis  lasix on hold. seems euvolemic on exam. given poor EF. will hold off IVF for now and continue to monitor   IVF as above if going for angiogram
Cont statin
Cont statin

## 2019-03-01 NOTE — PROGRESS NOTE ADULT - PROBLEM SELECTOR PROBLEM 5
Chronic kidney disease-mineral and bone disorder
Chronic kidney disease-mineral and bone disorder
CAD (coronary artery disease)
CAD (coronary artery disease)
Chronic kidney disease-mineral and bone disorder

## 2019-03-01 NOTE — DISCHARGE NOTE ADULT - MEDICATION SUMMARY - MEDICATIONS TO TAKE
I will START or STAY ON the medications listed below when I get home from the hospital:    acetaminophen 325 mg oral tablet  -- 2 tab(s) by mouth every 6 hours, As needed, Moderate Pain (4 - 6)  -- Indication: For Pain     apixaban 5 mg oral tablet  -- 1 tab(s) by mouth every 12 hours  -- Indication: For Afib    gabapentin 100 mg oral capsule  -- 1 cap(s) by mouth 2 times a day  -- Indication: For Pain    pravastatin 40 mg oral tablet  -- tab(s) by mouth once a day in morning  -- Indication: For  hyperlipidemia     hydrOXYzine hydrochloride 25 mg oral tablet  -- 1 tab(s) by mouth 2 times a day, As Needed  -- Indication: For As needed    Metoprolol Succinate ER 25 mg oral tablet, extended release  -- 1 tab(s) by mouth once a day  -- Indication: For HTN (Hypertension), Benign    Trelegy Ellipta inhalation powder  -- 1 puff(s) inhaled once a day  -- Indication: For Copd    silver sulfADIAZINE 1% topical cream  -- 1 application on skin once a day  -- Indication: For wound care     latanoprost 0.005% ophthalmic solution  -- 1 drop(s) to each affected eye once a day (at bedtime)  -- Indication: For Glaucoma    Rocaltrol 0.25 mcg oral capsule  -- 1 cap(s) by mouth once a day  -- Indication: For Chronic kidney disease-mineral and bone disorder

## 2019-03-01 NOTE — DISCHARGE NOTE ADULT - CARE PLAN
Principal Discharge DX:	Peripheral vascular disease  Goal:	remain stable  Assessment and plan of treatment:	Please follow up with your pcp within 1 week of discharge.  Please call to make an appointment within 1 week of discharge. No further inpatient vascular surgery interventions at this time.  Patient can follow-up with Dr. Figueroa as outpatient.   Please continue wound care to RLE: silvadene and wrap in kerlix daily  Secondary Diagnosis:	Chronic kidney disease-mineral and bone disorder  Goal:	remain stable  Assessment and plan of treatment:	Please follow up with your pcp within  1 week of discharge.  Please call to make an appointment within 1 week of discharge.  Please continue to monitor your creatinine level within 1 week of discharge.  Secondary Diagnosis:	HTN (Hypertension), Benign  Goal:	maintain adequate blood pressure control  Assessment and plan of treatment:	Please follow up with your pcp and cardiologist within 1 week of discharge.  Please call to make an appointment within 1 week of discharge.  Secondary Diagnosis:	MARY GRACE (acute kidney injury)  Goal:	resolved  Assessment and plan of treatment:	Please follow up with your pcp within 1 week of discharge.  Please call to make an appointment within 1 week of discharge.  Secondary Diagnosis:	CAD (coronary artery disease)  Goal:	continue current regimen  Assessment and plan of treatment:	Please follow up with your pcp within 1 week of discharge.  Please call to make an appointment within 1 week of discharge. Continue current regimen  Secondary Diagnosis:	Afib  Goal:	continue anticoagulation  Assessment and plan of treatment:	Please follow up with your pcp within 1 week of discharge.  Please call to make an appointment within 1 week of discharge.  Continue Eliquis 5mg p.o. BID.  Secondary Diagnosis:	Dyslipidemia  Goal:	continue current regimen  Assessment and plan of treatment:	Please follow up with your pcp within 1 week of discharge.  Please call to make an appointment within 1 week of discharge.  continue current regimen Principal Discharge DX:	Peripheral vascular disease  Goal:	remain stable  Assessment and plan of treatment:	Please follow up with your pcp within 1 week of discharge.  Please call to make an appointment within 1 week of discharge. No further inpatient vascular surgery interventions at this time.  Patient can follow-up with Dr. Figueroa as outpatient.   Please continue wound care to RLE: silvadene and wrap in kerlix daily  Please continue foot care/protection/podiatry follow up.  Please follow up with your pcp for referral for a podiatrist.  Secondary Diagnosis:	Chronic kidney disease-mineral and bone disorder  Goal:	remain stable  Assessment and plan of treatment:	Please follow up with your pcp within  1 week of discharge.  Please call to make an appointment within 1 week of discharge.  Please continue to monitor your creatinine level within 1 week of discharge.  Secondary Diagnosis:	HTN (Hypertension), Benign  Goal:	maintain adequate blood pressure control  Assessment and plan of treatment:	Please follow up with your pcp and cardiologist within 1 week of discharge.  Please call to make an appointment within 1 week of discharge. Please check your blood pressure prior to taking your blood pressure medications.  Secondary Diagnosis:	MARY GRACE (acute kidney injury)  Goal:	resolved  Assessment and plan of treatment:	Please follow up with your pcp within 1 week of discharge.  Please call to make an appointment within 1 week of discharge. Your lasix was held during this hospitilization.  Your legs are slightly swollen lasix reduced from out patient regimen of 40mg to 20mg daily. Please follow up with nephrologist Dr. Vazquez within 1 week of discharge.  Please call to make an appointment within 1 week of discharge.  Secondary Diagnosis:	CAD (coronary artery disease)  Goal:	continue current regimen  Assessment and plan of treatment:	Please follow up with your pcp within 1 week of discharge.  Please call to make an appointment within 1 week of discharge. Continue current regimen  Secondary Diagnosis:	Afib  Goal:	continue anticoagulation  Assessment and plan of treatment:	Please follow up with your pcp within 1 week of discharge.  Please call to make an appointment within 1 week of discharge.  Eliquis was held secondary to your INR level being elevated.  Continue Eliquis 5mg p.o. BID.  Please  check your blood work within 1 week of discharge.  Secondary Diagnosis:	Dyslipidemia  Goal:	continue current regimen  Assessment and plan of treatment:	Please follow up with your pcp within 1 week of discharge.  Please call to make an appointment within 1 week of discharge.  continue current regimen Principal Discharge DX:	Peripheral vascular disease  Goal:	remain stable  Assessment and plan of treatment:	Please follow up with your pcp within 1 week of discharge.  Please call to make an appointment within 1 week of discharge. No further inpatient vascular surgery interventions at this time.  Patient can follow-up with Dr. Figueroa as outpatient.   Please continue wound care to RLE: silvadene and wrap in kerlix daily  Please continue foot care/protection/podiatry follow up.  Please follow up with your pcp for referral for a podiatrist Dr. Lentz [phone].  Secondary Diagnosis:	Chronic kidney disease-mineral and bone disorder  Goal:	remain stable  Assessment and plan of treatment:	Please follow up with your pcp within  1 week of discharge.  Please call to make an appointment within 1 week of discharge.  Please continue to monitor your creatinine level within 1 week of discharge.  Secondary Diagnosis:	HTN (Hypertension), Benign  Goal:	maintain adequate blood pressure control  Assessment and plan of treatment:	Please follow up with your pcp and cardiologist within 1 week of discharge.  Please call to make an appointment within 1 week of discharge. Please check your blood pressure prior to taking your blood pressure medications.  Secondary Diagnosis:	MARY GRACE (acute kidney injury)  Goal:	resolved  Assessment and plan of treatment:	Please follow up with your pcp within 1 week of discharge.  Please call to make an appointment within 1 week of discharge. Your lasix was held during this hospitilization.  Your legs are slightly swollen lasix reduced from out patient regimen of 40mg to 20mg daily. Please follow up with nephrologist Dr. Vazquez within 1 week of discharge.  Please call to make an appointment within 1 week of discharge.  Secondary Diagnosis:	CAD (coronary artery disease)  Goal:	continue current regimen  Assessment and plan of treatment:	Please follow up with your pcp within 1 week of discharge.  Please call to make an appointment within 1 week of discharge. Continue current regimen  Secondary Diagnosis:	Afib  Goal:	continue anticoagulation  Assessment and plan of treatment:	Please follow up with your pcp within 1 week of discharge.  Please call to make an appointment within 1 week of discharge.  Eliquis was held secondary to your INR level being elevated.  Continue Eliquis 5mg p.o. BID.  Please  check your blood work within 1 week of discharge.  Secondary Diagnosis:	Dyslipidemia  Goal:	continue current regimen  Assessment and plan of treatment:	Please follow up with your pcp within 1 week of discharge.  Please call to make an appointment within 1 week of discharge.  continue current regimen

## 2019-03-01 NOTE — PROGRESS NOTE ADULT - SUBJECTIVE AND OBJECTIVE BOX
[facility]ACT[facility]   MD LEONOR SAM MD ANGELA WONG, PA    TEL:  OFFICE: [phone]  DR COLEMAN CELL: [phone]  DR. GUARDADO CELL: [phone]  AMBER SALINAS CELL: [phone]        Patient is a 73y old  Male who presents with a chief complaint of rt leg pain (26 Feb 2019 12:43)      Patient seen and examined at bedside. No chest pain/sob    VITALS:  T(F): 97.5 (02-27-19 @ 06:30), Max: 98.5 (02-26-19 @ 18:22)  HR: 72 (02-27-19 @ 06:30)  BP: 107/68 (02-27-19 @ 06:30)  RR: 18 (02-27-19 @ 06:30)  SpO2: 100% (02-27-19 @ 06:30)  Wt(kg): --    02-27 @ 07:01  -  02-27 @ 12:35  --------------------------------------------------------  IN: 0 mL / OUT: 175 mL / NET: -175 mL          PHYSICAL EXAM:  Constitutional: NAD  Neck: No JVD  Respiratory: CTAB, no wheezes, rales or rhonchi  Cardiovascular: S1, S2, RRR  Gastrointestinal: BS+, soft, NT/ND  Extremities: No peripheral edema    Hospital Medications:   MEDICATIONS  (STANDING):  atorvastatin 10 milliGRAM(s) Oral at bedtime  buDESOnide 160 MICROgram(s)/formoterol 4.5 MICROgram(s) Inhaler 2 Puff(s) Inhalation two times a day  calcitriol   Capsule 0.25 MICROGram(s) Oral daily  clopidogrel Tablet 75 milliGRAM(s) Oral daily  latanoprost 0.005% Ophthalmic Solution 1 Drop(s) Both EYES at bedtime  metoprolol succinate ER 25 milliGRAM(s) Oral daily      LABS:  02-27    142  |  92<L>  |  36<H>  ----------------------------<  86  3.5   |  36<H>  |  1.98<H>    Ca    9.4      27 Feb 2019 05:21  Phos  4.0     02-26  Mg     1.6     02-26    TPro  5.9<L>  /  Alb  3.4  /  TBili  1.6<H>  /  DBili      /  AST  61<H>  /  ALT  36  /  AlkPhos  96  02-27    Creatinine Trend: 1.98 <--, 2.37 <--    Albumin, Serum: 3.4 g/dL (02-27 @ 05:21)                              12.9   4.26  )-----------( 223      ( 27 Feb 2019 05:21 )             43.5     Urine Studies:  Urinalysis - [02-26-19 @ 21:45]      Color DARK YELLOW / Appearance CLEAR / SG 1.021 / pH 6.5      Gluc NEGATIVE / Ketone NEGATIVE  / Bili NEGATIVE / Urobili MODERATE       Blood NEGATIVE / Protein 50 / Leuk Est SMALL / Nitrite NEGATIVE      RBC 3-5 / WBC 26-50 / Hyaline 1+ / Gran  / Sq Epi MODERATE / Non Sq Epi  / Bacteria SMALL    Urine Creatinine 175.90      [02-26-19 @ 21:45]  Urine Sodium 28      [02-26-19 @ 21:45]    Iron 13, TIBC 354, %sat --      [04-05-18 @ 18:42]  Ferritin 33.85      [04-05-18 @ 18:42]  .0 (Ca --)      [04-05-18 @ 18:42]   --  PTH 70.45 (Ca --)      [04-03-18 @ 05:46]   --  Vitamin D (25OH) 49.5      [04-05-18 @ 18:42]  TSH 3.46      [04-01-18 @ 09:32]        RADIOLOGY & ADDITIONAL STUDIES:
[facility]   MD LEONOR SAM MD ANGELA WONG, PA    TEL:  OFFICE: [phone]  DR COLEMAN CELL: [phone]  DR. GUARDADO CELL: [phone]  AMBER SALINAS CELL: [phone]        Patient is a 73y old  Male who presents with a chief complaint of rt leg pain (28 Feb 2019 10:54)      Patient seen and examined at bedside. No chest pain/sob    VITALS:  T(F): 98.4 (02-28-19 @ 10:30), Max: 98.4 (02-28-19 @ 10:30)  HR: 80 (02-28-19 @ 10:30)  BP: 102/70 (02-28-19 @ 10:30)  RR: 18 (02-28-19 @ 10:30)  SpO2: 97% (02-28-19 @ 10:30)  Wt(kg): --    02-27 @ 07:01  -  02-28 @ 07:00  --------------------------------------------------------  IN: 0 mL / OUT: 175 mL / NET: -175 mL          PHYSICAL EXAM:  Constitutional: NAD  Neck: No JVD  Respiratory: CTAB, no wheezes, rales or rhonchi  Cardiovascular: S1, S2, RRR  Gastrointestinal: BS+, soft, NT/ND  Extremities: No peripheral edema    Hospital Medications:   MEDICATIONS  (STANDING):  atorvastatin 10 milliGRAM(s) Oral at bedtime  buDESOnide 160 MICROgram(s)/formoterol 4.5 MICROgram(s) Inhaler 2 Puff(s) Inhalation two times a day  calcitriol   Capsule 0.25 MICROGram(s) Oral daily  clopidogrel Tablet 75 milliGRAM(s) Oral daily  gabapentin 100 milliGRAM(s) Oral two times a day  latanoprost 0.005% Ophthalmic Solution 1 Drop(s) Both EYES at bedtime  metoprolol succinate ER 25 milliGRAM(s) Oral daily  potassium chloride    Tablet ER 40 milliEquivalent(s) Oral once      LABS:  02-28    141  |  93<L>  |  32<H>  ----------------------------<  114<H>  3.4<L>   |  37<H>  |  1.79<H>    Ca    9.0      28 Feb 2019 06:00    TPro  5.7<L>  /  Alb  3.3  /  TBili  1.2  /  DBili      /  AST  41<H>  /  ALT  27  /  AlkPhos  92  02-28    Creatinine Trend: 1.79 <--, 1.98 <--, 2.37 <--    Albumin, Serum: 3.3 g/dL (02-28 @ 06:00)                              11.8   3.70  )-----------( 219      ( 28 Feb 2019 06:00 )             39.5     Urine Studies:  Urinalysis - [02-26-19 @ 21:45]      Color DARK YELLOW / Appearance CLEAR / SG 1.021 / pH 6.5      Gluc NEGATIVE / Ketone NEGATIVE  / Bili NEGATIVE / Urobili MODERATE       Blood NEGATIVE / Protein 50 / Leuk Est SMALL / Nitrite NEGATIVE      RBC 3-5 / WBC 26-50 / Hyaline 1+ / Gran  / Sq Epi MODERATE / Non Sq Epi  / Bacteria SMALL    Urine Creatinine 175.90      [02-26-19 @ 21:45]  Urine Sodium 28      [02-26-19 @ 21:45]    Iron 13, TIBC 354, %sat --      [04-05-18 @ 18:42]  Ferritin 33.85      [04-05-18 @ 18:42]  .0 (Ca --)      [04-05-18 @ 18:42]   --  PTH 70.45 (Ca --)      [04-03-18 @ 05:46]   --  Vitamin D (25OH) 49.5      [04-05-18 @ 18:42]  TSH 3.46      [04-01-18 @ 09:32]        RADIOLOGY & ADDITIONAL STUDIES:
Patient is a 73y old  Male who presents with a chief complaint of rt leg pain (2019 12:43)      SUBJECTIVE / OVERNIGHT EVENTS:  complaints of right toe pain.  pain comes and goes in spasm.   Tramadol helps.   the wife at bedside.  no cp, no sob. no n/v/d.      Vital Signs Last 24 Hrs  T(C): 36.4 (2019 06:30), Max: 36.9 (2019 18:22)  T(F): 97.5 (2019 06:30), Max: 98.5 (2019 18:22)  HR: 72 (2019 06:30) (72 - 88)  BP: 107/68 (2019 06:30) (107/68 - 122/79)  BP(mean): --  RR: 18 (2019 06:30) (16 - 20)  SpO2: 100% (2019 06:30) (98% - 100%)  I&O's Summary    2019 07:01  -  2019 12:48  --------------------------------------------------------  IN: 0 mL / OUT: 175 mL / NET: -175 mL        PHYSICAL EXAM:  GENERAL: NAD, Comfortable, sitting up eating lunch  HEAD:  Atraumatic, Normocephalic  EYES: EOMI, PERRLA, conjunctiva and sclera clear  NECK: Supple, No JVD  CHEST/LUNG: Clear to auscultation bilaterally; No wheeze  HEART: Regular rate and rhythm; No murmurs, rubs, or gallops  ABDOMEN: Soft, Nontender, Nondistended; Bowel sounds present  Neuro: AAO x 3, no focal deficit, 5/5 b/l extremities  EXTREMITIES: faint peripheral Pulses right LE, skin sensitive to touch, tender, No clubbing, cyanosis. trace edema  SKIN: No rashes or lesions    LABS:                        12.9   4.26  )-----------( 223      ( 2019 05:21 )             43.5     02-27    142  |  92<L>  |  36<H>  ----------------------------<  86  3.5   |  36<H>  |  1.98<H>    Ca    9.4      2019 05:21  Phos  4.0       Mg     1.6         TPro  5.9<L>  /  Alb  3.4  /  TBili  1.6<H>  /  DBili  x   /  AST  61<H>  /  ALT  36  /  AlkPhos  96      PT/INR - ( 2019 05:21 )   PT: 27.1 SEC;   INR: 2.38          PTT - ( 2019 00:20 )  PTT:34.9 SEC  CAPILLARY BLOOD GLUCOSE      POCT Blood Glucose.: 82 mg/dL (2019 12:53)        Urinalysis Basic - ( 2019 21:45 )    Color: DARK YELLOW / Appearance: CLEAR / S.021 / pH: 6.5  Gluc: NEGATIVE / Ketone: NEGATIVE  / Bili: NEGATIVE / Urobili: MODERATE   Blood: NEGATIVE / Protein: 50 / Nitrite: NEGATIVE   Leuk Esterase: SMALL / RBC: 3-5 / WBC 26-50   Sq Epi: MODERATE / Non Sq Epi: x / Bacteria: SMALL        RADIOLOGY & ADDITIONAL TESTS:    Imaging Personally Reviewed:  [x] YES  [ ] NO    Consultant(s) Notes Reviewed:  [x] YES  [ ] NO      MEDICATIONS  (STANDING):  atorvastatin 10 milliGRAM(s) Oral at bedtime  buDESOnide 160 MICROgram(s)/formoterol 4.5 MICROgram(s) Inhaler 2 Puff(s) Inhalation two times a day  calcitriol   Capsule 0.25 MICROGram(s) Oral daily  clopidogrel Tablet 75 milliGRAM(s) Oral daily  latanoprost 0.005% Ophthalmic Solution 1 Drop(s) Both EYES at bedtime  metoprolol succinate ER 25 milliGRAM(s) Oral daily    MEDICATIONS  (PRN):  acetaminophen   Tablet .. 650 milliGRAM(s) Oral every 6 hours PRN Moderate Pain (4 - 6)  hydrOXYzine hydrochloride 25 milliGRAM(s) Oral two times a day PRN Itching  traMADol 50 milliGRAM(s) Oral every 6 hours PRN Severe Pain (7 - 10)      Care Discussed with Consultants/Other Providers [x] YES  [ ] NO    HEALTH ISSUES - PROBLEM Dx:  Need for prophylactic measure: Need for prophylactic measure  Glaucoma: Glaucoma  Dyslipidemia: Dyslipidemia  Afib: Afib  CAD (coronary artery disease): CAD (coronary artery disease)  Chronic kidney disease-mineral and bone disorder: Chronic kidney disease-mineral and bone disorder  PVD (peripheral vascular disease): PVD (peripheral vascular disease)  HTN (Hypertension), Benign: HTN (Hypertension), Benign  Stage 3 chronic kidney disease: Stage 3 chronic kidney disease  MARY GRACE (acute kidney injury): MARY GRACE (acute kidney injury)
[facility]   MD LEONOR SAM MD ANGELA WONG, PA    TEL:  OFFICE: [phone]  DR COLEMAN CELL: [phone]  DR. GUARDADO CELL: [phone]  AMBER SALINAS CELL: [phone]        Patient is a 73y old  Male who presents with a chief complaint of rt leg pain (28 Feb 2019 11:37)      Patient seen and examined at bedside. No chest pain/sob    VITALS:  T(F): 97.5 (03-01-19 @ 10:55), Max: 98.2 (02-28-19 @ 21:31)  HR: 73 (03-01-19 @ 10:55)  BP: 96/64 (03-01-19 @ 10:55)  RR: 16 (03-01-19 @ 10:55)  SpO2: 100% (03-01-19 @ 10:55)  Wt(kg): --    02-28 @ 07:01  -  03-01 @ 07:00  --------------------------------------------------------  IN: 0 mL / OUT: 200 mL / NET: -200 mL    03-01 @ 07:01  -  03-01 @ 11:35  --------------------------------------------------------  IN: 0 mL / OUT: 400 mL / NET: -400 mL          PHYSICAL EXAM:  Constitutional: NAD  Neck: No JVD  Respiratory: CTAB, no wheezes, rales or rhonchi  Cardiovascular: S1, S2, RRR  Gastrointestinal: BS+, soft, NT/ND  Extremities: 1+ peripheral edema    Hospital Medications:   MEDICATIONS  (STANDING):  apixaban 5 milliGRAM(s) Oral every 12 hours  atorvastatin 10 milliGRAM(s) Oral at bedtime  buDESOnide 160 MICROgram(s)/formoterol 4.5 MICROgram(s) Inhaler 2 Puff(s) Inhalation two times a day  calcitriol   Capsule 0.25 MICROGram(s) Oral daily  clopidogrel Tablet 75 milliGRAM(s) Oral daily  gabapentin 100 milliGRAM(s) Oral two times a day  latanoprost 0.005% Ophthalmic Solution 1 Drop(s) Both EYES at bedtime  metoprolol succinate ER 25 milliGRAM(s) Oral daily  silver sulfADIAZINE 1% Cream 1 Application(s) Topical daily      LABS:  03-01    141  |  93<L>  |  30<H>  ----------------------------<  86  4.3   |  32<H>  |  1.68<H>    Ca    9.4      01 Mar 2019 05:45    TPro  5.7<L>  /  Alb  3.3  /  TBili  1.2  /  DBili      /  AST  41<H>  /  ALT  27  /  AlkPhos  92  02-28    Creatinine Trend: 1.68 <--, 1.79 <--, 1.98 <--, 2.37 <--                                12.7   3.49  )-----------( 230      ( 01 Mar 2019 05:45 )             42.9     Urine Studies:  Urinalysis - [02-26-19 @ 21:45]      Color DARK YELLOW / Appearance CLEAR / SG 1.021 / pH 6.5      Gluc NEGATIVE / Ketone NEGATIVE  / Bili NEGATIVE / Urobili MODERATE       Blood NEGATIVE / Protein 50 / Leuk Est SMALL / Nitrite NEGATIVE      RBC 3-5 / WBC 26-50 / Hyaline 1+ / Gran  / Sq Epi MODERATE / Non Sq Epi  / Bacteria SMALL    Urine Creatinine 175.90      [02-26-19 @ 21:45]  Urine Sodium 28      [02-26-19 @ 21:45]    Iron 13, TIBC 354, %sat --      [04-05-18 @ 18:42]  Ferritin 33.85      [04-05-18 @ 18:42]  .0 (Ca --)      [04-05-18 @ 18:42]   --  PTH 70.45 (Ca --)      [04-03-18 @ 05:46]   --  Vitamin D (25OH) 49.5      [04-05-18 @ 18:42]  TSH 3.46      [04-01-18 @ 09:32]        RADIOLOGY & ADDITIONAL STUDIES:
Patient is a 73y old  Male who presents with a chief complaint of rt leg pain (2019 12:44)      SUBJECTIVE / OVERNIGHT EVENTS:  leg pain is better. shooting up pins/needle pain is less frequent.  tramadol helping.   he walked with PT today.  the wife at bedside.  pt complaining about having no salt at all for his food.   "Food has no taste, soup taste like boil water"  no n/v/d.  no cp, no sob.       Vital Signs Last 24 Hrs  T(C): 36.9 (2019 10:30), Max: 36.9 (2019 10:30)  T(F): 98.4 (2019 10:30), Max: 98.4 (2019 10:30)  HR: 80 (2019 10:30) (78 - 85)  BP: 102/70 (2019 10:30) (102/70 - 123/86)  BP(mean): --  RR: 18 (2019 10:30) (17 - 18)  SpO2: 97% (2019 10:30) (95% - 98%)  I&O's Summary    2019 07:01  -  2019 07:00  --------------------------------------------------------  IN: 0 mL / OUT: 175 mL / NET: -175 mL        PHYSICAL EXAM:  GENERAL: NAD, Comfortable  HEAD:  Atraumatic, Normocephalic  EYES: EOMI, PERRLA, conjunctiva and sclera clear  NECK: Supple, No JVD  CHEST/LUNG: Clear to auscultation bilaterally; No wheeze  HEART: Regular rate and rhythm; No murmurs, rubs, or gallops  ABDOMEN: Soft, Nontender, Nondistended; Bowel sounds present  Neuro: AAO x 3, no focal deficit, 5/5 b/l extremities  EXTREMITIES: faint peripheral Pulses right LE, skin sensitive to touch, tender, No clubbing, cyanosis. trace edema, foot nontender to touch  SKIN: No rashes or lesions      LABS:                        11.8   3.70  )-----------( 219      ( 2019 06:00 )             39.5     02-    141  |  93<L>  |  32<H>  ----------------------------<  114<H>  3.4<L>   |  37<H>  |  1.79<H>    Ca    9.0      2019 06:00    TPro  5.7<L>  /  Alb  3.3  /  TBili  1.2  /  DBili  x   /  AST  41<H>  /  ALT  27  /  AlkPhos  92      PT/INR - ( 2019 06:00 )   PT: 21.4 SEC;   INR: 1.84            CAPILLARY BLOOD GLUCOSE            Urinalysis Basic - ( 2019 21:45 )    Color: DARK YELLOW / Appearance: CLEAR / S.021 / pH: 6.5  Gluc: NEGATIVE / Ketone: NEGATIVE  / Bili: NEGATIVE / Urobili: MODERATE   Blood: NEGATIVE / Protein: 50 / Nitrite: NEGATIVE   Leuk Esterase: SMALL / RBC: 3-5 / WBC 26-50   Sq Epi: MODERATE / Non Sq Epi: x / Bacteria: SMALL        RADIOLOGY & ADDITIONAL TESTS:    Imaging Personally Reviewed:  [x] YES  [ ] NO    Consultant(s) Notes Reviewed:  [x] YES  [ ] NO      MEDICATIONS  (STANDING):  atorvastatin 10 milliGRAM(s) Oral at bedtime  buDESOnide 160 MICROgram(s)/formoterol 4.5 MICROgram(s) Inhaler 2 Puff(s) Inhalation two times a day  calcitriol   Capsule 0.25 MICROGram(s) Oral daily  clopidogrel Tablet 75 milliGRAM(s) Oral daily  latanoprost 0.005% Ophthalmic Solution 1 Drop(s) Both EYES at bedtime  metoprolol succinate ER 25 milliGRAM(s) Oral daily  potassium chloride    Tablet ER 40 milliEquivalent(s) Oral once    MEDICATIONS  (PRN):  acetaminophen   Tablet .. 650 milliGRAM(s) Oral every 6 hours PRN Moderate Pain (4 - 6)  hydrOXYzine hydrochloride 25 milliGRAM(s) Oral two times a day PRN Itching  traMADol 50 milliGRAM(s) Oral every 6 hours PRN Severe Pain (7 - 10)      Care Discussed with Consultants/Other Providers [x] YES  [ ] NO    HEALTH ISSUES - PROBLEM Dx:  Need for prophylactic measure: Need for prophylactic measure  Glaucoma: Glaucoma  Dyslipidemia: Dyslipidemia  Afib: Afib  CAD (coronary artery disease): CAD (coronary artery disease)  Chronic kidney disease-mineral and bone disorder: Chronic kidney disease-mineral and bone disorder  PVD (peripheral vascular disease): PVD (peripheral vascular disease)  HTN (Hypertension), Benign: HTN (Hypertension), Benign  Stage 3 chronic kidney disease: Stage 3 chronic kidney disease  MARY GRACE (acute kidney injury): MARY GRACE (acute kidney injury)

## 2019-03-01 NOTE — DISCHARGE NOTE ADULT - CARE PROVIDER_API CALL
Alejo Vazquez)  Internal Medicine; Nephrology  [address], [address] [place] [address]  Phone: [phone]  Fax: [phone]  Follow Up Time:     Artem Figueroa)  Vascular Surgery  [address] [place] [address]  Phone: [phone]  Fax: [phone]  Follow Up Time:

## 2019-03-01 NOTE — PROGRESS NOTE ADULT - PROBLEM SELECTOR PLAN 6
supplemented today  monitor
supplemented today  monitor
Cont metoprolol succinate 25 mg PO daily  Eliquis on hold 2' elevated INR  May consider decreasing dose to 2.5 mg bid if his serum Cr remains above 1.5
Cont metoprolol succinate 25 mg PO daily  Eliquis on hold 2' elevated INR and Cr  will consider restarting at lower dose of 2.5 mg bid if his serum Cr remains elevated

## 2019-03-01 NOTE — DISCHARGE NOTE ADULT - PLAN OF CARE
Please follow up with your pcp within 1 week of discharge.  Please call to make an appointment within 1 week of discharge. No further inpatient vascular surgery interventions at this time.  Patient can follow-up with Dr. Figueroa as outpatient.   Please continue wound care to RLE: silvadene and wrap in kerlix daily remain stable Please follow up with your pcp within  1 week of discharge.  Please call to make an appointment within 1 week of discharge.  Please continue to monitor your creatinine level within 1 week of discharge. maintain adequate blood pressure control Please follow up with your pcp and cardiologist within 1 week of discharge.  Please call to make an appointment within 1 week of discharge. resolved Please follow up with your pcp within 1 week of discharge.  Please call to make an appointment within 1 week of discharge. continue current regimen Please follow up with your pcp within 1 week of discharge.  Please call to make an appointment within 1 week of discharge. Continue current regimen continue anticoagulation Please follow up with your pcp within 1 week of discharge.  Please call to make an appointment within 1 week of discharge.  Continue Eliquis 5mg p.o. BID. Please follow up with your pcp within 1 week of discharge.  Please call to make an appointment within 1 week of discharge.  continue current regimen Please follow up with your pcp within 1 week of discharge.  Please call to make an appointment within 1 week of discharge. No further inpatient vascular surgery interventions at this time.  Patient can follow-up with Dr. Figueroa as outpatient.   Please continue wound care to RLE: silvadene and wrap in kerlix daily  Please continue foot care/protection/podiatry follow up.  Please follow up with your pcp for referral for a podiatrist. Please follow up with your pcp and cardiologist within 1 week of discharge.  Please call to make an appointment within 1 week of discharge. Please check your blood pressure prior to taking your blood pressure medications. Please follow up with your pcp within 1 week of discharge.  Please call to make an appointment within 1 week of discharge. Your lasix was held during this hospitilization.  Your legs are slightly swollen lasix reduced from out patient regimen of 40mg to 20mg daily. Please follow up with nephrologist Dr. Vazquez within 1 week of discharge.  Please call to make an appointment within 1 week of discharge. Please follow up with your pcp within 1 week of discharge.  Please call to make an appointment within 1 week of discharge.  Eliquis was held secondary to your INR level being elevated.  Continue Eliquis 5mg p.o. BID.  Please  check your blood work within 1 week of discharge. Please follow up with your pcp within 1 week of discharge.  Please call to make an appointment within 1 week of discharge. No further inpatient vascular surgery interventions at this time.  Patient can follow-up with Dr. Figueroa as outpatient.   Please continue wound care to RLE: silvadene and wrap in kerlix daily  Please continue foot care/protection/podiatry follow up.  Please follow up with your pcp for referral for a podiatrist Dr. Lentz [phone].

## 2019-03-01 NOTE — DISCHARGE NOTE ADULT - ADDITIONAL INSTRUCTIONS
GLAUCOMA -Cont latanoprost.   HYPOKALEMIA-supplemented: within normal limits   Please obtain an echocardiogram as an outpt GLAUCOMA -Cont latanoprost.   HYPOKALEMIA-supplemented: within normal limits   Please obtain an echocardiogram as an outpt  Continue home oxygen 2liters nasal cannula   Please follow up with your pcp within 1 week of discharge.  Please call to make an appointment within 1 week of discharge.

## 2019-03-01 NOTE — PROGRESS NOTE ADULT - PROBLEM SELECTOR PLAN 5
check PTH, phos level  monitor phos and calcium daily.
Cont metoprolol succinate 25 mg PO daily  Cont plavix  Cont statin
Cont metoprolol succinate 25 mg PO daily  Cont plavix  Cont statin

## 2019-03-25 ENCOUNTER — APPOINTMENT (OUTPATIENT)
Dept: THORACIC SURGERY | Facility: CLINIC | Age: 74
End: 2019-03-25

## 2019-11-26 NOTE — H&P ADULT - PROBLEM SELECTOR PROBLEM 8
Patient informed and now scheduled on 12/04/19 for physical to discuss multiple things he said and medications.  Syeda Villalobos, CMA    
Received request for Ambien refill.  Not on active med list. Please find ou if he is taking it and if so, who is prescribing it.  One month supply refill - please follow up if feel needing more. thanks  
Zolpidem tartrate 10 mg tabs      Last Written Prescription Date:  04/08/19  Last Fill Quantity: 30,   # refills: 0  Last Office Visit: 09/16/19  Future Office visit:       Routing refill request to provider for review/approval because:  Drug not active on patient's medication list    
Glaucoma

## 2021-05-18 NOTE — PROGRESS NOTE ADULT - PROBLEM SELECTOR PLAN 2
Reminder letter mailed to patient to contact Elizabeth RODRIGUEZ at [phone] to schedule a Colonoscopy.   baseline ~ 1.5  CKD likely sec to HTN. baseline ~ 1.5  CKD likely sec to HTN.  monitor renal function  avoid nephrotoxics, NSAIDS RCA<

## 2021-10-11 NOTE — ED ADULT NURSE NOTE - NS PRO AD BILL OF RIGHTS
Gretel Correia)  Gynecologic Oncology; Obstetrics and Gynecology  [address] [place] [address], [address] [place] [address]  Phone: [phone]  Fax: [phone]  Established Patient  Follow Up Time: 2 weeks   Yes

## 2022-11-08 NOTE — H&P ADULT - PROBLEM/PLAN-4
-- DO NOT REPLY / DO NOT REPLY ALL --  -- Message is from Engagement Center Operations ([facility]) --    General Patient Message patient requesting medication for possible UTI     Caller Information       Type Contact Phone/Fax    11/07/2022 12:31 PM CST Phone (Incoming) Alexander Flynnena S (Self) [phone] (M)        Alternative phone number: no    Can a detailed message be left? Yes    Message Turnaround:     Is it Working Hours? Yes - Working Hours     [place]:    Please give this turnaround time to the caller:   \"This message will be sent to [state Provider's name]. The clinical team will fulfill your request as soon as they review your message.\"                
Called pt back and left v/m to please try to go to immediate care for evaluation for possible UTI in order for antibiotics to be prescribed    
DISPLAY PLAN FREE TEXT

## 2022-12-19 NOTE — PACU DISCHARGE NOTE - NAUSEA/VOMITING:
Patient alert, oriented x 4. Has generalized weakness, denies any pain. Has pressure ulcer on coccyx, buttocks. Wound care performed at 1600. Patient has Burk in place, continue to have hematuria with haze appearance. I&O recorded. Patient had no BM today. SCD on BLE, also has boots for BLE to prevent pressure ulcer. Blood transfusion started one unit. VS checked before, when blood reach veins, 15 min after, then in one hour. Patient VS appears to be stable, besides oxygen level 89 % saturation. Supplemental oxygen administered 1 L for saturation > 90, with benefit for the patient. Nurse was with patient first 15 min, patient has stable VS, oxygen 93 % and up, no SOB, or any adverse reactions. Patient verbalized that blurred vision is better. Bed in lowest position. Call light within reach. Caregiver/son at bedside. None

## 2024-01-26 NOTE — H&P ADULT - PROBLEM/PLAN-2
Bleeding that does not stop/Swelling that gets worse/Pain not relieved by Medications/Fever greater than (need to indicate Fahrenheit or Celsius) DISPLAY PLAN FREE TEXT
